# Patient Record
Sex: MALE | Race: ASIAN | Employment: OTHER | URBAN - METROPOLITAN AREA
[De-identification: names, ages, dates, MRNs, and addresses within clinical notes are randomized per-mention and may not be internally consistent; named-entity substitution may affect disease eponyms.]

---

## 2017-01-18 ENCOUNTER — TELEPHONE (OUTPATIENT)
Dept: FAMILY MEDICINE CLINIC | Facility: CLINIC | Age: 77
End: 2017-01-18

## 2017-01-18 DIAGNOSIS — E55.9 VITAMIN D DEFICIENCY: ICD-10-CM

## 2017-01-18 DIAGNOSIS — D64.9 ANEMIA, UNSPECIFIED TYPE: ICD-10-CM

## 2017-01-18 DIAGNOSIS — IMO0001 UNCONTROLLED TYPE 2 DIABETES MELLITUS WITHOUT COMPLICATION, WITHOUT LONG-TERM CURRENT USE OF INSULIN: Primary | ICD-10-CM

## 2017-01-18 DIAGNOSIS — I10 ESSENTIAL HYPERTENSION: ICD-10-CM

## 2017-01-18 RX ORDER — ERGOCALCIFEROL 1.25 MG/1
CAPSULE ORAL
Qty: 4 CAPSULE | Refills: 0 | OUTPATIENT
Start: 2017-01-18

## 2017-01-18 NOTE — TELEPHONE ENCOUNTER
Future Appointments  Date Time Provider [place]   1/19/2017 9:00 AM PF [facility] PF OUTPT [facility]   1/23/2017 2:45 PM Toro Cee MD [facility] [address] [facility] [address]

## 2017-01-19 ENCOUNTER — LAB ENCOUNTER (OUTPATIENT)
Dept: LAB | Age: 77
End: 2017-01-19
Attending: FAMILY MEDICINE
Payer: MEDICAID

## 2017-01-19 DIAGNOSIS — I10 ESSENTIAL HYPERTENSION: ICD-10-CM

## 2017-01-19 DIAGNOSIS — IMO0001 UNCONTROLLED TYPE 2 DIABETES MELLITUS WITHOUT COMPLICATION, WITHOUT LONG-TERM CURRENT USE OF INSULIN: ICD-10-CM

## 2017-01-19 DIAGNOSIS — E55.9 VITAMIN D DEFICIENCY: ICD-10-CM

## 2017-01-19 DIAGNOSIS — D64.9 ANEMIA, UNSPECIFIED TYPE: ICD-10-CM

## 2017-01-19 LAB
25-HYDROXYVITAMIN D (TOTAL): 39.3 NG/ML (ref 30–100)
BASOPHILS # BLD AUTO: 0.01 X10(3) UL (ref 0–0.1)
BASOPHILS NFR BLD AUTO: 0.2 %
BUN BLD-MCNC: 20 MG/DL (ref 8–20)
CALCIUM BLD-MCNC: 9.3 MG/DL (ref 8.3–10.3)
CHLORIDE: 104 MMOL/L (ref 101–111)
CO2: 25 MMOL/L (ref 22–32)
CREAT BLD-MCNC: 1.45 MG/DL (ref 0.7–1.3)
EOSINOPHIL # BLD AUTO: 0.02 X10(3) UL (ref 0–0.3)
EOSINOPHIL NFR BLD AUTO: 0.4 %
ERYTHROCYTE [DISTWIDTH] IN BLOOD BY AUTOMATED COUNT: 18.8 % (ref 11.5–16)
EST. AVERAGE GLUCOSE BLD GHB EST-MCNC: 186 MG/DL (ref 68–126)
GLUCOSE BLD-MCNC: 86 MG/DL (ref 70–99)
HBA1C MFR BLD HPLC: 8.1 % (ref ?–5.7)
HCT VFR BLD AUTO: 32.6 % (ref 37–53)
HGB BLD-MCNC: 10.2 G/DL (ref 13–17)
IMMATURE GRANULOCYTE COUNT: 0.01 X10(3) UL (ref 0–1)
IMMATURE GRANULOCYTE RATIO %: 0.2 %
LYMPHOCYTES # BLD AUTO: 0.83 X10(3) UL (ref 0.9–4)
LYMPHOCYTES NFR BLD AUTO: 17.3 %
MCH RBC QN AUTO: 20 PG (ref 27–33.2)
MCHC RBC AUTO-ENTMCNC: 31.3 G/DL (ref 31–37)
MCV RBC AUTO: 63.9 FL (ref 80–99)
MONOCYTES # BLD AUTO: 0.9 X10(3) UL (ref 0.1–0.6)
MONOCYTES NFR BLD AUTO: 18.8 %
NEUTROPHIL ABS PRELIM: 3.02 X10 (3) UL (ref 1.3–6.7)
NEUTROPHILS # BLD AUTO: 3.02 X10(3) UL (ref 1.3–6.7)
NEUTROPHILS NFR BLD AUTO: 63.1 %
PLATELET # BLD AUTO: 138 10(3)UL (ref 150–450)
POTASSIUM SERPL-SCNC: 4.2 MMOL/L (ref 3.6–5.1)
RBC # BLD AUTO: 5.1 X10(6)UL (ref 3.8–5.8)
RED CELL DISTRIBUTION WIDTH-SD: 39.1 FL (ref 35.1–46.3)
SODIUM SERPL-SCNC: 138 MMOL/L (ref 136–144)
WBC # BLD AUTO: 4.8 X10(3) UL (ref 4–13)

## 2017-01-19 PROCEDURE — 82306 VITAMIN D 25 HYDROXY: CPT

## 2017-01-19 PROCEDURE — 83036 HEMOGLOBIN GLYCOSYLATED A1C: CPT

## 2017-01-19 PROCEDURE — 80048 BASIC METABOLIC PNL TOTAL CA: CPT

## 2017-01-19 PROCEDURE — 36415 COLL VENOUS BLD VENIPUNCTURE: CPT

## 2017-01-19 PROCEDURE — 85025 COMPLETE CBC W/AUTO DIFF WBC: CPT

## 2017-01-20 ENCOUNTER — TELEPHONE (OUTPATIENT)
Dept: FAMILY MEDICINE CLINIC | Facility: CLINIC | Age: 77
End: 2017-01-20

## 2017-01-20 NOTE — TELEPHONE ENCOUNTER
Patient may continue medication as directed but he needs to eat properly also and should not skip meals. If any signs of low blood sugar occur patient should taken sugar immediately.

## 2017-01-20 NOTE — TELEPHONE ENCOUNTER
Future Appointments  Date Time Provider Craig Be   1/23/2017 11:15 AM Shelton Heimlich, MD [facility] [address] [facility] [address]

## 2017-01-20 NOTE — TELEPHONE ENCOUNTER
Daughter said patient has not been feeling well for over 4-5 days, she took him yesterday to get his labs drawn he has a appointment scheduled for Monday. He has not been eating properly or taking his meds like he should.  He did not take his meds yesterday

## 2017-01-21 NOTE — TELEPHONE ENCOUNTER
Spoke with patients daughter in law Danny Nevarez verified per HIPAA that patient should continue medication as directed and needs to eat properly and not skip any meals. Any signs of low blood sugar should take sugars immediately.  Danny Nevarez agrees with plan a

## 2017-01-23 ENCOUNTER — OFFICE VISIT (OUTPATIENT)
Dept: FAMILY MEDICINE CLINIC | Facility: CLINIC | Age: 77
End: 2017-01-23

## 2017-01-23 VITALS
HEIGHT: 66.5 IN | HEART RATE: 72 BPM | SYSTOLIC BLOOD PRESSURE: 118 MMHG | TEMPERATURE: 98 F | OXYGEN SATURATION: 98 % | RESPIRATION RATE: 12 BRPM | BODY MASS INDEX: 28.03 KG/M2 | DIASTOLIC BLOOD PRESSURE: 60 MMHG | WEIGHT: 176.5 LBS

## 2017-01-23 DIAGNOSIS — I10 ESSENTIAL HYPERTENSION: ICD-10-CM

## 2017-01-23 DIAGNOSIS — D64.9 ANEMIA, UNSPECIFIED TYPE: ICD-10-CM

## 2017-01-23 DIAGNOSIS — E78.00 HIGH CHOLESTEROL: ICD-10-CM

## 2017-01-23 DIAGNOSIS — IMO0001 UNCONTROLLED TYPE 2 DIABETES MELLITUS WITHOUT COMPLICATION, WITHOUT LONG-TERM CURRENT USE OF INSULIN: Primary | ICD-10-CM

## 2017-01-23 DIAGNOSIS — I25.83 CORONARY ARTERY DISEASE DUE TO LIPID RICH PLAQUE: ICD-10-CM

## 2017-01-23 DIAGNOSIS — E55.9 VITAMIN D DEFICIENCY: ICD-10-CM

## 2017-01-23 DIAGNOSIS — I25.10 CORONARY ARTERY DISEASE DUE TO LIPID RICH PLAQUE: ICD-10-CM

## 2017-01-23 PROCEDURE — 99214 OFFICE O/P EST MOD 30 MIN: CPT | Performed by: FAMILY MEDICINE

## 2017-01-23 NOTE — PROGRESS NOTES
HPI:    Patient ID: Alba Quiñones is a 77year old male.     HPI  Patient is here for follow-up of Patient Active Problem List:     Diabetes type 2, uncontrolled ([address]     High cholesterol     Essential hypertension     Coronary artery disease due to l Physical Exam  Awake, alert, in no acute distress  HENT:  normocephalic, atraumatic, external ear canals clear bilaterally, tympanic membranes appear opaque, non-bulging, non-erythematous, nasal turbinates are non-inflamed and not swollen, oropharynx wit hemoglobin A1c in 3 months and follow-up after. Patient will be in [facility]) for the next 4 months. We will see him back after that. No orders of the defined types were placed in this encounter.        Meds This Visit:  No prescriptions requested or ordered

## 2017-02-02 RX ORDER — LINAGLIPTIN 5 MG/1
TABLET, FILM COATED ORAL
Qty: 90 TABLET | Refills: 0 | Status: SHIPPED | OUTPATIENT
Start: 2017-02-02 | End: 2017-06-22

## 2017-02-02 NOTE — TELEPHONE ENCOUNTER
Diabetic Medication Protocol Failed2/2 10:52 AM   Last HgBA1C < 7.5        Medication pended and routed to PCP

## 2017-04-29 ENCOUNTER — TELEPHONE (OUTPATIENT)
Dept: FAMILY MEDICINE CLINIC | Facility: CLINIC | Age: 77
End: 2017-04-29

## 2017-06-23 RX ORDER — LINAGLIPTIN 5 MG/1
TABLET, FILM COATED ORAL
Qty: 90 TABLET | Refills: 0 | Status: SHIPPED | OUTPATIENT
Start: 2017-06-23 | End: 2017-07-26

## 2017-06-23 NOTE — TELEPHONE ENCOUNTER
Diabetic Medication Protocol Failed6/22 6:13 PM   Last HgBA1C < 7.5        Last A1C 1/19/17- 8.1    Medication pended and routed to PCP

## 2017-06-23 NOTE — TELEPHONE ENCOUNTER
Medicine improved, patient should also have fasting blood work done and follow-up appointment. See last progress note.

## 2017-06-30 ENCOUNTER — TELEPHONE (OUTPATIENT)
Dept: FAMILY MEDICINE CLINIC | Facility: CLINIC | Age: 77
End: 2017-06-30

## 2017-06-30 DIAGNOSIS — IMO0001 UNCONTROLLED TYPE 2 DIABETES MELLITUS WITHOUT COMPLICATION, WITHOUT LONG-TERM CURRENT USE OF INSULIN: Primary | ICD-10-CM

## 2017-07-03 NOTE — TELEPHONE ENCOUNTER
Pt daughter called and asked what medication would be recommended because Ernestene First is not covered by new [facility] community.  Per protocol pt daughter was told to call insurance and find out what medication is covered, then we would be able to call it int

## 2017-07-05 NOTE — ADDENDUM NOTE
Encounter addended by: Jan Saravia on: 7/5/2017 12:11 PM<BR>    Actions taken: Letter status changed

## 2017-07-18 ENCOUNTER — TELEPHONE (OUTPATIENT)
Dept: FAMILY MEDICINE CLINIC | Facility: CLINIC | Age: 77
End: 2017-07-18

## 2017-07-18 RX ORDER — GLYBURIDE-METFORMIN HYDROCHLORIDE 2.5; 5 MG/1; MG/1
1 TABLET ORAL 3 TIMES DAILY
Qty: 270 TABLET | Refills: 0 | Status: SHIPPED | OUTPATIENT
Start: 2017-07-18 | End: 2017-11-01

## 2017-07-18 NOTE — TELEPHONE ENCOUNTER
Received request from [address] in [facility] for new script (pt has new insurance). Pt will need Contour Next -strips, meter, and lancets. Received refill request from [employer] in [facility] for Glyburid/Metform 2.5-500 mg tab for quantity of 270.

## 2017-07-26 ENCOUNTER — TELEPHONE (OUTPATIENT)
Dept: FAMILY MEDICINE CLINIC | Facility: CLINIC | Age: 77
End: 2017-07-26

## 2017-07-26 DIAGNOSIS — I25.83 CORONARY ARTERY DISEASE DUE TO LIPID RICH PLAQUE: ICD-10-CM

## 2017-07-26 DIAGNOSIS — I25.10 CORONARY ARTERY DISEASE DUE TO LIPID RICH PLAQUE: ICD-10-CM

## 2017-07-26 DIAGNOSIS — I10 ESSENTIAL HYPERTENSION: ICD-10-CM

## 2017-07-26 DIAGNOSIS — IMO0001 UNCONTROLLED TYPE 2 DIABETES MELLITUS WITHOUT COMPLICATION, WITHOUT LONG-TERM CURRENT USE OF INSULIN: Primary | ICD-10-CM

## 2017-07-26 NOTE — TELEPHONE ENCOUNTER
Patient has been off the Trajenta 5 mg  for two weeks. Vanessa Reddy is not covered by patient's insurance.  Patient's daughter states that the insurance gave her the names of two medications that are covered, Alogliptin and Benzoate, but they are both for 25 mg

## 2017-07-26 NOTE — TELEPHONE ENCOUNTER
Dr Ameena Johnson said to give patient enough samples to last until Dr Samson Choudhury returns back to office. Daughter notified and will  samples today.  New meter and test strips sent to [facility]

## 2017-08-18 ENCOUNTER — TELEPHONE (OUTPATIENT)
Dept: FAMILY MEDICINE CLINIC | Facility: CLINIC | Age: 77
End: 2017-08-18

## 2017-08-18 NOTE — TELEPHONE ENCOUNTER
Future Appointments  Date Time Provider Craig Be   9/9/2017 12:00 PM Paulo Gates MD [facility] [address] [facility] [address]     Samples available  Please advise if it is ok for patient to have samples and how many allowed?

## 2017-08-18 NOTE — TELEPHONE ENCOUNTER
Patient has an appt for 9-9-17 with Dr. Eligio Jarvis. Patient would like to know if there are any samples of Trajenta, as he is getting low.

## 2017-08-27 ENCOUNTER — APPOINTMENT (OUTPATIENT)
Dept: GENERAL RADIOLOGY | Facility: HOSPITAL | Age: 77
DRG: 247 | End: 2017-08-27
Attending: EMERGENCY MEDICINE
Payer: MEDICAID

## 2017-08-27 ENCOUNTER — TELEPHONE (OUTPATIENT)
Dept: FAMILY MEDICINE CLINIC | Facility: CLINIC | Age: 77
End: 2017-08-27

## 2017-08-27 ENCOUNTER — HOSPITAL ENCOUNTER (INPATIENT)
Facility: HOSPITAL | Age: 77
LOS: 2 days | Discharge: HOME | DRG: 247 | End: 2017-08-29
Attending: EMERGENCY MEDICINE | Admitting: HOSPITALIST
Payer: MEDICAID

## 2017-08-27 DIAGNOSIS — R07.9 ACUTE CHEST PAIN: Primary | ICD-10-CM

## 2017-08-27 DIAGNOSIS — R77.8 ELEVATED TROPONIN: ICD-10-CM

## 2017-08-27 DIAGNOSIS — I48.91 ATRIAL FIBRILLATION WITH RAPID VENTRICULAR RESPONSE (HCC): ICD-10-CM

## 2017-08-27 LAB
ALBUMIN SERPL-MCNC: 3.7 G/DL (ref 3.5–4.8)
ALP LIVER SERPL-CCNC: 102 U/L (ref 45–117)
ALT SERPL-CCNC: 25 U/L (ref 17–63)
APTT PPP: 156.5 SECONDS (ref 25–34)
APTT PPP: 30.3 SECONDS (ref 25–34)
AST SERPL-CCNC: 20 U/L (ref 15–41)
ATRIAL RATE: 105 BPM
BASOPHILS # BLD AUTO: 0.01 X10(3) UL (ref 0–0.1)
BASOPHILS NFR BLD AUTO: 0.2 %
BILIRUB SERPL-MCNC: 0.7 MG/DL (ref 0.1–2)
BUN BLD-MCNC: 20 MG/DL (ref 8–20)
CALCIUM BLD-MCNC: 9.4 MG/DL (ref 8.3–10.3)
CHLORIDE: 106 MMOL/L (ref 101–111)
CO2: 24 MMOL/L (ref 22–32)
CREAT BLD-MCNC: 1.35 MG/DL (ref 0.7–1.3)
EOSINOPHIL # BLD AUTO: 0.02 X10(3) UL (ref 0–0.3)
EOSINOPHIL NFR BLD AUTO: 0.3 %
ERYTHROCYTE [DISTWIDTH] IN BLOOD BY AUTOMATED COUNT: 18.5 % (ref 11.5–16)
EST. AVERAGE GLUCOSE BLD GHB EST-MCNC: 203 MG/DL (ref 68–126)
GLUCOSE BLD-MCNC: 128 MG/DL (ref 65–99)
GLUCOSE BLD-MCNC: 150 MG/DL (ref 65–99)
GLUCOSE BLD-MCNC: 251 MG/DL (ref 70–99)
HBA1C MFR BLD HPLC: 8.7 % (ref ?–5.7)
HCT VFR BLD AUTO: 34 % (ref 37–53)
HGB BLD-MCNC: 10.6 G/DL (ref 13–17)
IMMATURE GRANULOCYTE COUNT: 0.02 X10(3) UL (ref 0–1)
IMMATURE GRANULOCYTE RATIO %: 0.3 %
LYMPHOCYTES # BLD AUTO: 1.01 X10(3) UL (ref 0.9–4)
LYMPHOCYTES NFR BLD AUTO: 16.5 %
M PROTEIN MFR SERPL ELPH: 7.6 G/DL (ref 6.1–8.3)
MCH RBC QN AUTO: 19.8 PG (ref 27–33.2)
MCHC RBC AUTO-ENTMCNC: 31.2 G/DL (ref 31–37)
MCV RBC AUTO: 63.4 FL (ref 80–99)
MONOCYTES # BLD AUTO: 0.59 X10(3) UL (ref 0.1–0.6)
MONOCYTES NFR BLD AUTO: 9.6 %
NEUTROPHIL ABS PRELIM: 4.47 X10 (3) UL (ref 1.3–6.7)
NEUTROPHILS # BLD AUTO: 4.47 X10(3) UL (ref 1.3–6.7)
NEUTROPHILS NFR BLD AUTO: 73.1 %
PLATELET # BLD AUTO: 152 10(3)UL (ref 150–450)
PLATELET MORPHOLOGY: NORMAL
POTASSIUM SERPL-SCNC: 5.5 MMOL/L (ref 3.6–5.1)
Q-T INTERVAL: 286 MS
QRS DURATION: 106 MS
QTC CALCULATION (BEZET): 394 MS
R AXIS: -45 DEGREES
RBC # BLD AUTO: 5.36 X10(6)UL (ref 3.8–5.8)
RED CELL DISTRIBUTION WIDTH-SD: 38.8 FL (ref 35.1–46.3)
SODIUM SERPL-SCNC: 136 MMOL/L (ref 136–144)
T AXIS: 83 DEGREES
TROPONIN: 0.14 NG/ML (ref ?–0.05)
TROPONIN: 9.58 NG/ML (ref ?–0.05)
VENTRICULAR RATE: 114 BPM
WBC # BLD AUTO: 6.1 X10(3) UL (ref 4–13)

## 2017-08-27 PROCEDURE — 71010 XR CHEST AP PORTABLE  (CPT=71010): CPT | Performed by: EMERGENCY MEDICINE

## 2017-08-27 PROCEDURE — 99223 1ST HOSP IP/OBS HIGH 75: CPT | Performed by: HOSPITALIST

## 2017-08-27 RX ORDER — METOPROLOL SUCCINATE 25 MG/1
25 TABLET, EXTENDED RELEASE ORAL ONCE
Status: DISCONTINUED | OUTPATIENT
Start: 2017-08-27 | End: 2017-08-29

## 2017-08-27 RX ORDER — CLOPIDOGREL BISULFATE 75 MG/1
75 TABLET ORAL DAILY
Status: DISCONTINUED | OUTPATIENT
Start: 2017-08-27 | End: 2017-08-28

## 2017-08-27 RX ORDER — ASPIRIN 81 MG/1
324 TABLET, CHEWABLE ORAL ONCE
Status: COMPLETED | OUTPATIENT
Start: 2017-08-27 | End: 2017-08-27

## 2017-08-27 RX ORDER — NITROGLYCERIN 0.4 MG/1
0.4 TABLET SUBLINGUAL ONCE
Status: COMPLETED | OUTPATIENT
Start: 2017-08-27 | End: 2017-08-27

## 2017-08-27 RX ORDER — HEPARIN SODIUM AND DEXTROSE 10000; 5 [USP'U]/100ML; G/100ML
INJECTION INTRAVENOUS CONTINUOUS
Status: DISCONTINUED | OUTPATIENT
Start: 2017-08-27 | End: 2017-08-28

## 2017-08-27 RX ORDER — SODIUM CHLORIDE 9 MG/ML
INJECTION, SOLUTION INTRAVENOUS CONTINUOUS
Status: DISCONTINUED | OUTPATIENT
Start: 2017-08-27 | End: 2017-08-29

## 2017-08-27 RX ORDER — ASPIRIN 81 MG/1
324 TABLET, CHEWABLE ORAL ONCE
Status: DISCONTINUED | OUTPATIENT
Start: 2017-08-27 | End: 2017-08-27

## 2017-08-27 RX ORDER — SODIUM CHLORIDE 9 MG/ML
INJECTION, SOLUTION INTRAVENOUS CONTINUOUS
Status: ACTIVE | OUTPATIENT
Start: 2017-08-27 | End: 2017-08-27

## 2017-08-27 RX ORDER — HEPARIN SODIUM AND DEXTROSE 10000; 5 [USP'U]/100ML; G/100ML
12 INJECTION INTRAVENOUS ONCE
Status: COMPLETED | OUTPATIENT
Start: 2017-08-27 | End: 2017-08-27

## 2017-08-27 RX ORDER — METOPROLOL TARTRATE 5 MG/5ML
5 INJECTION INTRAVENOUS ONCE
Status: COMPLETED | OUTPATIENT
Start: 2017-08-27 | End: 2017-08-27

## 2017-08-27 RX ORDER — METOPROLOL SUCCINATE 50 MG/1
50 TABLET, EXTENDED RELEASE ORAL
Status: DISCONTINUED | OUTPATIENT
Start: 2017-08-28 | End: 2017-08-29

## 2017-08-27 RX ORDER — DEXTROSE MONOHYDRATE 25 G/50ML
50 INJECTION, SOLUTION INTRAVENOUS
Status: DISCONTINUED | OUTPATIENT
Start: 2017-08-27 | End: 2017-08-29

## 2017-08-27 RX ORDER — HEPARIN SODIUM 5000 [USP'U]/ML
60 INJECTION INTRAVENOUS; SUBCUTANEOUS ONCE
Status: COMPLETED | OUTPATIENT
Start: 2017-08-27 | End: 2017-08-27

## 2017-08-27 RX ORDER — ONDANSETRON 2 MG/ML
4 INJECTION INTRAMUSCULAR; INTRAVENOUS EVERY 6 HOURS PRN
Status: DISCONTINUED | OUTPATIENT
Start: 2017-08-27 | End: 2017-08-29

## 2017-08-27 RX ORDER — ERGOCALCIFEROL 1.25 MG/1
50000 CAPSULE ORAL WEEKLY
Status: DISCONTINUED | OUTPATIENT
Start: 2017-08-27 | End: 2017-08-29

## 2017-08-27 RX ORDER — ONDANSETRON 2 MG/ML
4 INJECTION INTRAMUSCULAR; INTRAVENOUS EVERY 4 HOURS PRN
Status: DISCONTINUED | OUTPATIENT
Start: 2017-08-27 | End: 2017-08-29

## 2017-08-27 RX ORDER — ASPIRIN 81 MG/1
81 TABLET, CHEWABLE ORAL DAILY
Status: DISCONTINUED | OUTPATIENT
Start: 2017-08-27 | End: 2017-08-28

## 2017-08-27 RX ORDER — LISINOPRIL 40 MG/1
40 TABLET ORAL DAILY
Status: DISCONTINUED | OUTPATIENT
Start: 2017-08-27 | End: 2017-08-27

## 2017-08-27 RX ORDER — NITROGLYCERIN 0.4 MG/1
0.4 TABLET SUBLINGUAL EVERY 5 MIN PRN
Status: DISCONTINUED | OUTPATIENT
Start: 2017-08-27 | End: 2017-08-29

## 2017-08-27 RX ORDER — ATORVASTATIN CALCIUM 40 MG/1
40 TABLET, FILM COATED ORAL NIGHTLY
Status: DISCONTINUED | OUTPATIENT
Start: 2017-08-27 | End: 2017-08-29

## 2017-08-27 RX ORDER — ACETAMINOPHEN 325 MG/1
650 TABLET ORAL EVERY 6 HOURS PRN
Status: DISCONTINUED | OUTPATIENT
Start: 2017-08-27 | End: 2017-08-29

## 2017-08-27 RX ORDER — AMLODIPINE BESYLATE 5 MG/1
10 TABLET ORAL DAILY
Status: DISCONTINUED | OUTPATIENT
Start: 2017-08-27 | End: 2017-08-29

## 2017-08-27 NOTE — H&P
[facility]  History and Physical     Sherri Espinosa Patient Status:  Inpatient    1940 MRN [id number]   [facility] 2NE-A Attending Rosemarie Mejia MD   Hosp Day # 0 PCP Albert Cortes MD     Chief Complaint: chest pain, d mention of complication, not stated as uncontrolled    • Unspecified essential hypertension         Past Surgical History: Past Surgical History:  No date: ANGIOGRAM  No date: ANGIOPLASTY (CORONARY)    Social History:  reports that he has never smoked.  He Suppl (ISABELLA CONTOUR NEXT MONITOR) w/Device Does not apply Kit 1 Device by Other route 2 (two) times daily. Use as directed. Disp: 1 kit Rfl: 0   Glucose Blood (ISABELLA CONTOUR NEXT TEST) In Vitro Strip Use as directed.  Disp: 100 strip Rfl: 1   ISABELLA [facility] 08/27/17   1102   WBC  6.1   HGB  10.6*   MCV  63.4*   PLT  152.0       Recent Labs   Lab  08/27/17   1102   GLU  251*   BUN  20   CREATSERUM  1.35*   CA  9.4   ALB  3.7   NA  136   K  5.5*   CL  106   CO2  24.0   ALKPHO  102   AST  20   ALT  25   BILT  0.

## 2017-08-27 NOTE — PLAN OF CARE
NURSING ADMISSION NOTE      Patient admitted via cart  Oriented to room. Safety precautions initiated. Bed in low position. Call light in reach. Received from ER, updated history and pta meds.  Patient states his chest pain is more discomfort and he

## 2017-08-27 NOTE — ED PROVIDER NOTES
Patient Seen in: [facility] Emergency Department    History   Patient presents with:  Hypotension (cardiovascular)    Stated Complaint: hypotention    HPI  The patient is a 67-year-old male who arrives here with complaints of questionable low blood metoprolol succinate (TOPROL XL) 12.5 mg Oral Tab,  Take 50 mg by mouth Daily Beta Blocker. nitroGLYCERIN (NITROSTAT) 0.4 MG Sublingual SL Tab,  Place 0.4 mg under the tongue every 5 (five) minutes as needed for Chest pain.    Linagliptin (TRADJENTA) 5 1105]  Pulse: 97 [08/27/17 1105]  Resp: 18 [08/27/17 1105]  Temp: 97.6 °F (36.4 °C) [08/27/17 1311]  Temp src: Oral [08/27/17 1311]  SpO2: 99 % [08/27/17 1105]  O2 Device: None (Room air) [08/27/17 1105]    Current:/70 (BP Location: Left arm)   Pulse normal limits   PTT, ACTIVATED - Normal    Narrative: The aPTT Heparin Therapeutic Range is approximately 65- 104 seconds. The therapeutic range has been validated against 0.3-0.7 heparin anti-Xa units/mL.      CBC WITH DIFFERENTIAL WITH PLATELET    Ravinder Impression:  Acute chest pain  (primary encounter diagnosis)  Atrial fibrillation with rapid ventricular response (HCC)  Elevated troponin    Disposition:  Admit    Follow-up:  No follow-up provider specified.     Medications Prescribed:  Current Discharge

## 2017-08-27 NOTE — CONSULTS
[facility]  Cardiology Consultation    Lucita Boykin Patient Status:  Inpatient    1940 MRN [id number]   [facility] 2NE-A Attending Fan Medrano MD   Hosp Day # 0 PCP Samuel Hassan MD     Reason for Consultation:  Maury Garzon 75 mg, Oral, Daily  •  ergocalciferol (DRISDOL/VITAMIN D2) cap 50,000 Units, 50,000 Units, Oral, Weekly  •  [START ON 8/28/2017] Metoprolol Succinate ER (Toprol XL) 24 hr tab 50 mg, 50 mg, Oral, Daily Beta Blocker  •  nitroGLYCERIN (NITROSTAT) SL tab 0.4 m No respiratory or constitutional distress. HEENT: Normocephalic, anicteric sclera, neck supple. Neck: No JVD, carotids 2+, no bruits. Cardiac: irreg irreg, LATA  Lungs: Clear without wheezes, rales, rhonchi or dullness. Normal excursions and effort.   Ab

## 2017-08-27 NOTE — PLAN OF CARE
Patient was in a fib when he arrived this afternoon, he converted to sinus jason around 1400. Patient states he feels much better and denies any further chest discomfort. He tolerated up in the chair and ambulated in halls. His heart rate has been 50-58.

## 2017-08-27 NOTE — ED INITIAL ASSESSMENT (HPI)
Pt presents to ER with low blood pressure, sweating, and chest pressure. No SOB. Yes dizzy. Family first noted a bp of 53/48, and a 2nd BP of 94/71. Glucose at home was 228. Pt continues to have chest pressure. Respirations equal and nonlabored.  Speaking c

## 2017-08-27 NOTE — TELEPHONE ENCOUNTER
Incoming call from pt's daughter in law questioning whether or not pt should go to ER. Pt this morning with profuse sweating and low BP of 70/40. Repeat BPs around 120/70 and pulse 110-120. Per daughter in law, pt sitting up and conversing normally.   Deepali You

## 2017-08-27 NOTE — HISTORICAL OFFICE NOTE
Elizabeth Slaughter  : 1940  ACCOUNT:  [id number]  [phone]  PCP: Dr. Solis Shook     TODAY'S DATE: 2016  DICTATED BY:  Cortez Guy Bonnetta Najjar, M.D.]    CHIEF COMPLAINT: [Followup of CAD, of native vessels, Followup of Carotid artery stenosis, bilat transient atrial fibrillation in a lab that went away with a single dose of IV amiodarone. He has never had any recurrence of palpitations. He has a slight lump in his right groin; it is not painful, it is very tiny, he says.  The patient's daughter-in-law hypertension; abnl ufct 2015 with score 1456, laser atherectomy and kissing balloons for isr of OM1 October 2016- no new stents placed at that time.  native rca 90%;  lad with 50-60% with neg ffr and PTCA and stent placement in the circumflex with RAYO alvarado an upper endoscopy and colonoscopy without stopping aspirin and Plavix to see if there are any lesions that might need to be more urgently removed.  In terms of his residual coronary disease, I have e-mailed Dr. Bud Santoroate today to see whether or not what his t Rosa Isela Mitchell M.D.  MD/omari - DD: 11/07/2016 - DT: 11/08/2016 - Job ID: [id number] -   c: Dr. Jose L Cade, Dr. Harman Burgess - Fax [phone]

## 2017-08-28 ENCOUNTER — APPOINTMENT (OUTPATIENT)
Dept: INTERVENTIONAL RADIOLOGY/VASCULAR | Facility: HOSPITAL | Age: 77
DRG: 247 | End: 2017-08-28
Attending: HOSPITALIST
Payer: MEDICAID

## 2017-08-28 ENCOUNTER — APPOINTMENT (OUTPATIENT)
Dept: CV DIAGNOSTICS | Facility: HOSPITAL | Age: 77
DRG: 247 | End: 2017-08-28
Attending: INTERNAL MEDICINE
Payer: MEDICAID

## 2017-08-28 LAB
APTT PPP: 72.7 SECONDS (ref 25–34)
ATRIAL RATE: 55 BPM
ATRIAL RATE: 56 BPM
ATRIAL RATE: 58 BPM
BUN BLD-MCNC: 20 MG/DL (ref 8–20)
CALCIUM BLD-MCNC: 9 MG/DL (ref 8.3–10.3)
CHLORIDE: 107 MMOL/L (ref 101–111)
CHOLEST SMN-MCNC: 99 MG/DL (ref ?–200)
CO2: 26 MMOL/L (ref 22–32)
CREAT BLD-MCNC: 1.16 MG/DL (ref 0.7–1.3)
ERYTHROCYTE [DISTWIDTH] IN BLOOD BY AUTOMATED COUNT: 18.4 % (ref 11.5–16)
GLUCOSE BLD-MCNC: 134 MG/DL (ref 70–99)
GLUCOSE BLD-MCNC: 147 MG/DL (ref 65–99)
GLUCOSE BLD-MCNC: 151 MG/DL (ref 65–99)
GLUCOSE BLD-MCNC: 181 MG/DL (ref 65–99)
GLUCOSE BLD-MCNC: 205 MG/DL (ref 65–99)
GLUCOSE BLD-MCNC: 230 MG/DL (ref 65–99)
HCT VFR BLD AUTO: 33.1 % (ref 37–53)
HDLC SERPL-MCNC: 32 MG/DL (ref 45–?)
HDLC SERPL: 3.09 {RATIO} (ref ?–4.97)
HGB BLD-MCNC: 10.3 G/DL (ref 13–17)
ISTAT ACTIVATED CLOTTING TIME: 384 SECONDS (ref 74–137)
LDLC SERPL CALC-MCNC: 50 MG/DL (ref ?–130)
LDLC SERPL-MCNC: 17 MG/DL (ref 5–40)
MCH RBC QN AUTO: 19.7 PG (ref 27–33.2)
MCHC RBC AUTO-ENTMCNC: 31.1 G/DL (ref 31–37)
MCV RBC AUTO: 63.2 FL (ref 80–99)
NONHDLC SERPL-MCNC: 67 MG/DL (ref ?–130)
P AXIS: 39 DEGREES
P AXIS: 40 DEGREES
P AXIS: 43 DEGREES
P-R INTERVAL: 150 MS
P-R INTERVAL: 152 MS
P-R INTERVAL: 152 MS
PLATELET # BLD AUTO: 154 10(3)UL (ref 150–450)
POTASSIUM SERPL-SCNC: 4.5 MMOL/L (ref 3.6–5.1)
Q-T INTERVAL: 382 MS
Q-T INTERVAL: 410 MS
Q-T INTERVAL: 410 MS
QRS DURATION: 102 MS
QRS DURATION: 106 MS
QRS DURATION: 110 MS
QTC CALCULATION (BEZET): 374 MS
QTC CALCULATION (BEZET): 392 MS
QTC CALCULATION (BEZET): 395 MS
R AXIS: -43 DEGREES
R AXIS: -43 DEGREES
R AXIS: -45 DEGREES
RBC # BLD AUTO: 5.24 X10(6)UL (ref 3.8–5.8)
RED CELL DISTRIBUTION WIDTH-SD: 38.8 FL (ref 35.1–46.3)
SODIUM SERPL-SCNC: 138 MMOL/L (ref 136–144)
T AXIS: 17 DEGREES
T AXIS: 17 DEGREES
T AXIS: 35 DEGREES
TRIGLYCERIDES: 84 MG/DL (ref ?–150)
TROPONIN: 24.1 NG/ML (ref ?–0.05)
VENTRICULAR RATE: 55 BPM
VENTRICULAR RATE: 56 BPM
VENTRICULAR RATE: 58 BPM
WBC # BLD AUTO: 5.6 X10(3) UL (ref 4–13)

## 2017-08-28 PROCEDURE — 027135Z DILATION OF CORONARY ARTERY, TWO ARTERIES WITH TWO DRUG-ELUTING INTRALUMINAL DEVICES, PERCUTANEOUS APPROACH: ICD-10-PCS | Performed by: INTERNAL MEDICINE

## 2017-08-28 PROCEDURE — 93306 TTE W/DOPPLER COMPLETE: CPT | Performed by: INTERNAL MEDICINE

## 2017-08-28 PROCEDURE — 4A023N7 MEASUREMENT OF CARDIAC SAMPLING AND PRESSURE, LEFT HEART, PERCUTANEOUS APPROACH: ICD-10-PCS | Performed by: INTERNAL MEDICINE

## 2017-08-28 PROCEDURE — B2151ZZ FLUOROSCOPY OF LEFT HEART USING LOW OSMOLAR CONTRAST: ICD-10-PCS | Performed by: INTERNAL MEDICINE

## 2017-08-28 PROCEDURE — 99232 SBSQ HOSP IP/OBS MODERATE 35: CPT | Performed by: HOSPITALIST

## 2017-08-28 PROCEDURE — B2111ZZ FLUOROSCOPY OF MULTIPLE CORONARY ARTERIES USING LOW OSMOLAR CONTRAST: ICD-10-PCS | Performed by: INTERNAL MEDICINE

## 2017-08-28 RX ORDER — SODIUM CHLORIDE 9 MG/ML
INJECTION, SOLUTION INTRAVENOUS CONTINUOUS
Status: ACTIVE | OUTPATIENT
Start: 2017-08-28 | End: 2017-08-28

## 2017-08-28 RX ORDER — MIDAZOLAM HYDROCHLORIDE 1 MG/ML
INJECTION INTRAMUSCULAR; INTRAVENOUS
Status: COMPLETED
Start: 2017-08-28 | End: 2017-08-28

## 2017-08-28 RX ORDER — CLOPIDOGREL BISULFATE 75 MG/1
75 TABLET ORAL DAILY
Status: DISCONTINUED | OUTPATIENT
Start: 2017-08-29 | End: 2017-08-29

## 2017-08-28 RX ORDER — LIDOCAINE HYDROCHLORIDE 10 MG/ML
INJECTION, SOLUTION INFILTRATION; PERINEURAL
Status: COMPLETED
Start: 2017-08-28 | End: 2017-08-28

## 2017-08-28 RX ORDER — HEPARIN SODIUM 5000 [USP'U]/ML
INJECTION, SOLUTION INTRAVENOUS; SUBCUTANEOUS
Status: COMPLETED
Start: 2017-08-28 | End: 2017-08-28

## 2017-08-28 NOTE — PAYOR COMM NOTE
--------------  ADMISSION REVIEW     Payor: [employer]  Subscriber #:  [id number]  Authorization Number: N/A    Admit date: 8/27/17  Admit time: 2138       Admitting Physician: Richard Barrera MD  Attending Physician:  Aniya Friedman MD  Pr the following:     Troponin 0.136 (*)     All other components within normal limits   RBC MORPHOLOGY SCAN - Abnormal; Notable for the following:     RBC Morphology See morphology below (*)     Microcytosis Moderate (*)     Tear Drop Cells Small (*)     Ova heparin gtt  2. NSTEMI  1. Monitor troponins  2. EKG  3. ECHO  4. Possible angio in am   3. DM type II  1. Hold oral meds  2. Hyperglycemia protocol  4. CAD  1. Resume home meds  5.  Carotid artery stenosis      MEDICATIONS ADMINISTERED IN LAST 1 DAY:  AmLO Katelynn Preciado RN      0.9%  NaCl infusion     Date Action Dose Route User    8/28/2017 7195 New Bag (none) Intravenous Cricket Clifton RN        PLAN: - admit with tele  - IV heparin per ACS protocol  - trend cardiac enzymes  - [facility] with poss PCI oma

## 2017-08-28 NOTE — PROCEDURES
[facility]  PCI Procedure Note    Nilay Laguerre Location: Cath Lab    [facility] [id number] MRN [id number]   Admission Date 8/27/2017 Procedure Date 8/28/2017   Attending Physician Jonathan Monteiro MD Procedure Physician Valeria Alcaraz MD     Pre-Procedur Mercedes PARTIDA 0.75 guide catheter was used to cannulate the right coronary artery. The vessel was dilated with a 2.25 mm balloon. A 2.25 x 20 mm Promus drug-eluting stent was deployed. Manual pressure was ultimately used to obtain hemostasis.     IV was ma

## 2017-08-28 NOTE — PROGRESS NOTES
Received pt back from cath lab accompanied per staff s/p PTCA/STEPHENIE to ramus and rca. Right groin site is soft to touch w/ femstop in place from the cath cath lab post manual pressure. Poc updated, routine post cath recovery.  Cpm.

## 2017-08-28 NOTE — PLAN OF CARE
CARDIOVASCULAR - ADULT    • Absence of cardiac arrhythmias or at baseline Progressing            AOx4. Room Air. Denies SOB/CP/other discomfort. NAD. NSR/SB w/ BBB. Heparin drip per ACS protocol. Denies CP. Plan for angiogram tomorrow.  Consent signed & paula

## 2017-08-28 NOTE — DIETARY NOTE
Nutrition Short Note   Dietitian consult received per cardiac rehab standing order. Pt to be educated by cardiac rehab staff as appropriate and encouraged to attend outpatient classes taught by DEANA. DEANA available PRN.      Jack Cramer RD, LDN  Pager [phone]

## 2017-08-28 NOTE — PROGRESS NOTES
[facility]  Progress Note     Rue Form Patient Status:  Inpatient    1940 MRN [id number]   [facility] 2NE-A Attending Anais Bernard MD   Hosp Day # 1 PCP Shamika Negron MD     Chief Complaint: chest pressure    S: 50,000 Units Oral Weekly   • metoprolol succinate  50 mg Oral Daily Beta Blocker   • insulin detemir  10 Units Subcutaneous Daily   • Insulin Aspart Pen  2-10 Units Subcutaneous TID CC and HS       ASSESSMENT / PLAN:     1. A.fib with RVR  1.  In NSR  2. On

## 2017-08-29 VITALS
WEIGHT: 171.31 LBS | HEART RATE: 59 BPM | SYSTOLIC BLOOD PRESSURE: 110 MMHG | OXYGEN SATURATION: 96 % | RESPIRATION RATE: 18 BRPM | DIASTOLIC BLOOD PRESSURE: 60 MMHG | TEMPERATURE: 98 F | HEIGHT: 67 IN | BODY MASS INDEX: 26.89 KG/M2

## 2017-08-29 LAB
BUN BLD-MCNC: 23 MG/DL (ref 8–20)
CALCIUM BLD-MCNC: 9.1 MG/DL (ref 8.3–10.3)
CHLORIDE: 105 MMOL/L (ref 101–111)
CO2: 24 MMOL/L (ref 22–32)
CREAT BLD-MCNC: 1.25 MG/DL (ref 0.7–1.3)
ERYTHROCYTE [DISTWIDTH] IN BLOOD BY AUTOMATED COUNT: 18.6 % (ref 11.5–16)
GLUCOSE BLD-MCNC: 128 MG/DL (ref 70–99)
GLUCOSE BLD-MCNC: 153 MG/DL (ref 65–99)
GLUCOSE BLD-MCNC: 196 MG/DL (ref 65–99)
GLUCOSE BLD-MCNC: 243 MG/DL (ref 65–99)
HCT VFR BLD AUTO: 33.7 % (ref 37–53)
HGB BLD-MCNC: 10.5 G/DL (ref 13–17)
MCH RBC QN AUTO: 19.6 PG (ref 27–33.2)
MCHC RBC AUTO-ENTMCNC: 31.2 G/DL (ref 31–37)
MCV RBC AUTO: 62.8 FL (ref 80–99)
PLATELET # BLD AUTO: 141 10(3)UL (ref 150–450)
POTASSIUM SERPL-SCNC: 4.2 MMOL/L (ref 3.6–5.1)
RBC # BLD AUTO: 5.37 X10(6)UL (ref 3.8–5.8)
RED CELL DISTRIBUTION WIDTH-SD: 38.3 FL (ref 35.1–46.3)
SODIUM SERPL-SCNC: 138 MMOL/L (ref 136–144)
WBC # BLD AUTO: 6.2 X10(3) UL (ref 4–13)

## 2017-08-29 PROCEDURE — 99239 HOSP IP/OBS DSCHRG MGMT >30: CPT | Performed by: HOSPITALIST

## 2017-08-29 RX ORDER — METOPROLOL SUCCINATE 50 MG/1
50 TABLET, EXTENDED RELEASE ORAL
Qty: 30 TABLET | Refills: 5 | Status: ON HOLD | OUTPATIENT
Start: 2017-08-29 | End: 2017-09-09

## 2017-08-29 NOTE — DISCHARGE SUMMARY
[facility]  DISCHARGE SUMMARY     Ngozi Daniels Patient Status:  Inpatient    1940 MRN [id number]   [facility] 2NE-A Attending Shell Pascual MD   Hosp Day # 2 PCP Armand Whitley MD     Date of Admission: 2017  Date associating or relieving factors. He denies any radiation to his neck or his arm. He denies any shortness of breath. He states it was a pressure-like sensation.   He states that the pain is now much better and he rates it about a 2 out of 10 in intensity MOUTH ONCE DAILY. Quantity:  90 tablet  Refills:  1     atorvastatin 40 MG Tabs  Commonly known as:  LIPITOR      Take 40 mg by mouth nightly. Refills:  3     ISABELLA MICROLET LANCETS Misc      1 lancet by Finger stick route 2 (two) times daily.  Use as d lisinopril 40 MG Tabs  Commonly known as:  PRINIVIL,ZESTRIL      TAKE 1 TABLET BY MOUTH ONCE DAILY.    Quantity:  90 tablet  Refills:  1           Where to Get Your Medications      These medications were sent to [address], [place] -

## 2017-08-29 NOTE — PLAN OF CARE
Patient is alert and oriented. Saturates well on room air, NSR on the tele. Right groin is c/d/i, soft to the touch. Patient denies any chest pain or SOB. POC updated, all questions answered. Call light within reach, will continue to monitor.      IV disc

## 2017-08-29 NOTE — CM/SW NOTE
Call received from primary RN to check for Xarelto coverage. Prescription called into to patient's pharmacy [facility] [phone], requires prior auth.  Call to Insurance pharmacy plan [phone], pa initiated, was flagged urgent, will take 24-72 hours f

## 2017-08-29 NOTE — PROGRESS NOTES
[facility]  Progress Note     Sreedhar Jesus Patient Status:  Inpatient    1940 MRN [id number]   [facility] 2NE-A Attending Berta Omer MD   Hosp Day # 2 PCP Sebastián Mensah MD     Chief Complaint: chest pressure    S: succinate  25 mg Oral Once   • AmLODIPine Besylate  10 mg Oral Daily   • atorvastatin  40 mg Oral Nightly   • ergocalciferol  50,000 Units Oral Weekly   • metoprolol succinate  50 mg Oral Daily Beta Blocker   • insulin detemir  10 Units Subcutaneous Daily

## 2017-08-29 NOTE — PROGRESS NOTES
[facility]  Cardiology Progress Note    Mayito Dempsey Patient Status:  Inpatient    1940 MRN [id number]   [facility] 2NE-A Attending Day Orona MD   Hosp Day # 2 PCP Donavan Cameron MD     Subjective:  No complaints of c Beta Blocker   • insulin detemir  10 Units Subcutaneous Daily   • Insulin Aspart Pen  2-10 Units Subcutaneous TID CC and HS     • sodium chloride 20 mL/hr at 08/28/17 6023       Assessment:  · NSTEMI, Troponin 24 - s/p  successful PTCA and stent placement

## 2017-08-29 NOTE — PLAN OF CARE
CARDIOVASCULAR - ADULT    • Absence of cardiac arrhythmias or at baseline Progressing        Diabetes/Glucose Control    • Glucose maintained within prescribed range Progressing

## 2017-08-30 ENCOUNTER — PATIENT OUTREACH (OUTPATIENT)
Dept: CASE MANAGEMENT | Age: 77
End: 2017-08-30

## 2017-08-30 ENCOUNTER — PRIOR ORIGINAL RECORDS (OUTPATIENT)
Dept: OTHER | Age: 77
End: 2017-08-30

## 2017-08-30 NOTE — PAYOR COMM NOTE
--------------  DISCHARGE REVIEW    Payor: [employer]  Subscriber #:  [id number]  Authorization Number: N/A    Admit date: 8/27/17  Admit time:  1301  Discharge Date: 8/29/2017  7:10 PM     Admitting Physician: Saloni Currie MD  Attending

## 2017-08-30 NOTE — PROGRESS NOTES
Initial Post Discharge Follow Up   Discharge Date: 8/29/17  Contact Date: 8/30/2017    Consent Verification:  Assessment Completed With: Caregiver: Lizbet FRENCH Permission received per patient?  written  HIPAA Verified?   Yes    Discharge Dx:  Non-ST e

## 2017-08-31 ENCOUNTER — OFFICE VISIT (OUTPATIENT)
Dept: FAMILY MEDICINE CLINIC | Facility: CLINIC | Age: 77
End: 2017-08-31

## 2017-08-31 VITALS
HEART RATE: 72 BPM | TEMPERATURE: 97 F | RESPIRATION RATE: 16 BRPM | BODY MASS INDEX: 27.79 KG/M2 | SYSTOLIC BLOOD PRESSURE: 142 MMHG | WEIGHT: 175 LBS | DIASTOLIC BLOOD PRESSURE: 70 MMHG | HEIGHT: 66.5 IN

## 2017-08-31 DIAGNOSIS — H91.91 HEARING LOSS OF RIGHT EAR, UNSPECIFIED HEARING LOSS TYPE: ICD-10-CM

## 2017-08-31 DIAGNOSIS — R07.9 ACUTE CHEST PAIN: Primary | ICD-10-CM

## 2017-08-31 DIAGNOSIS — IMO0001 UNCONTROLLED TYPE 2 DIABETES MELLITUS WITHOUT COMPLICATION, WITHOUT LONG-TERM CURRENT USE OF INSULIN: ICD-10-CM

## 2017-08-31 DIAGNOSIS — I10 ESSENTIAL HYPERTENSION: ICD-10-CM

## 2017-08-31 DIAGNOSIS — I21.4 NSTEMI (NON-ST ELEVATED MYOCARDIAL INFARCTION) (HCC): ICD-10-CM

## 2017-08-31 DIAGNOSIS — I48.91 ATRIAL FIBRILLATION WITH RAPID VENTRICULAR RESPONSE (HCC): ICD-10-CM

## 2017-08-31 PROCEDURE — 99215 OFFICE O/P EST HI 40 MIN: CPT | Performed by: FAMILY MEDICINE

## 2017-09-07 ENCOUNTER — PRIOR ORIGINAL RECORDS (OUTPATIENT)
Dept: OTHER | Age: 77
End: 2017-09-07

## 2017-09-07 ENCOUNTER — HOSPITAL ENCOUNTER (OUTPATIENT)
Facility: HOSPITAL | Age: 77
Setting detail: OBSERVATION
Discharge: HOME | End: 2017-09-09
Attending: EMERGENCY MEDICINE | Admitting: HOSPITALIST
Payer: MEDICAID

## 2017-09-07 ENCOUNTER — APPOINTMENT (OUTPATIENT)
Dept: GENERAL RADIOLOGY | Facility: HOSPITAL | Age: 77
End: 2017-09-07
Attending: EMERGENCY MEDICINE
Payer: MEDICAID

## 2017-09-07 DIAGNOSIS — I48.91 ATRIAL FIBRILLATION WITH RAPID VENTRICULAR RESPONSE (HCC): ICD-10-CM

## 2017-09-07 DIAGNOSIS — R07.9 ACUTE CHEST PAIN: Primary | ICD-10-CM

## 2017-09-07 LAB
ALBUMIN SERPL-MCNC: 4 G/DL (ref 3.5–4.8)
ALP LIVER SERPL-CCNC: 123 U/L (ref 45–117)
ALT SERPL-CCNC: 27 U/L (ref 17–63)
AST SERPL-CCNC: 22 U/L (ref 15–41)
ATRIAL RATE: 267 BPM
BASOPHILS # BLD AUTO: 0.01 X10(3) UL (ref 0–0.1)
BASOPHILS NFR BLD AUTO: 0.1 %
BILIRUB SERPL-MCNC: 0.8 MG/DL (ref 0.1–2)
BUN BLD-MCNC: 22 MG/DL (ref 8–20)
CALCIUM BLD-MCNC: 9.7 MG/DL (ref 8.3–10.3)
CHLORIDE: 102 MMOL/L (ref 101–111)
CO2: 24 MMOL/L (ref 22–32)
CREAT BLD-MCNC: 1.29 MG/DL (ref 0.7–1.3)
EOSINOPHIL # BLD AUTO: 0.05 X10(3) UL (ref 0–0.3)
EOSINOPHIL NFR BLD AUTO: 0.7 %
ERYTHROCYTE [DISTWIDTH] IN BLOOD BY AUTOMATED COUNT: 19.1 % (ref 11.5–16)
EST. AVERAGE GLUCOSE BLD GHB EST-MCNC: 203 MG/DL (ref 68–126)
GLUCOSE BLD-MCNC: 157 MG/DL (ref 65–99)
GLUCOSE BLD-MCNC: 173 MG/DL (ref 65–99)
GLUCOSE BLD-MCNC: 182 MG/DL (ref 70–99)
HBA1C MFR BLD HPLC: 8.7 % (ref ?–5.7)
HCT VFR BLD AUTO: 36.6 % (ref 37–53)
HGB BLD-MCNC: 11.6 G/DL (ref 13–17)
IMMATURE GRANULOCYTE COUNT: 0.03 X10(3) UL (ref 0–1)
IMMATURE GRANULOCYTE RATIO %: 0.4 %
LYMPHOCYTES # BLD AUTO: 1.72 X10(3) UL (ref 0.9–4)
LYMPHOCYTES NFR BLD AUTO: 24.5 %
M PROTEIN MFR SERPL ELPH: 8.4 G/DL (ref 6.1–8.3)
MCH RBC QN AUTO: 20 PG (ref 27–33.2)
MCHC RBC AUTO-ENTMCNC: 31.7 G/DL (ref 31–37)
MCV RBC AUTO: 63.2 FL (ref 80–99)
MONOCYTES # BLD AUTO: 0.69 X10(3) UL (ref 0.1–0.6)
MONOCYTES NFR BLD AUTO: 9.8 %
NEUTROPHIL ABS PRELIM: 4.53 X10 (3) UL (ref 1.3–6.7)
NEUTROPHILS # BLD AUTO: 4.53 X10(3) UL (ref 1.3–6.7)
NEUTROPHILS NFR BLD AUTO: 64.5 %
PLATELET # BLD AUTO: 184 10(3)UL (ref 150–450)
POTASSIUM SERPL-SCNC: 4.8 MMOL/L (ref 3.6–5.1)
Q-T INTERVAL: 322 MS
QRS DURATION: 102 MS
QTC CALCULATION (BEZET): 455 MS
R AXIS: -46 DEGREES
RBC # BLD AUTO: 5.79 X10(6)UL (ref 3.8–5.8)
RED CELL DISTRIBUTION WIDTH-SD: 38.1 FL (ref 35.1–46.3)
SODIUM SERPL-SCNC: 135 MMOL/L (ref 136–144)
T AXIS: 93 DEGREES
TROPONIN: 0.29 NG/ML (ref ?–0.05)
TROPONIN: 0.33 NG/ML (ref ?–0.05)
TROPONIN: 0.34 NG/ML (ref ?–0.05)
VENTRICULAR RATE: 120 BPM
WBC # BLD AUTO: 7 X10(3) UL (ref 4–13)

## 2017-09-07 PROCEDURE — 99220 INITIAL OBSERVATION CARE,LEVL III: CPT | Performed by: HOSPITALIST

## 2017-09-07 PROCEDURE — 71010 XR CHEST AP PORTABLE  (CPT=71010): CPT | Performed by: EMERGENCY MEDICINE

## 2017-09-07 RX ORDER — ASPIRIN 325 MG
325 TABLET ORAL DAILY
Status: DISCONTINUED | OUTPATIENT
Start: 2017-09-07 | End: 2017-09-09

## 2017-09-07 RX ORDER — NITROGLYCERIN 0.4 MG/1
0.4 TABLET SUBLINGUAL EVERY 5 MIN PRN
Status: DISCONTINUED | OUTPATIENT
Start: 2017-09-07 | End: 2017-09-09

## 2017-09-07 RX ORDER — AMLODIPINE BESYLATE 5 MG/1
10 TABLET ORAL DAILY
Status: DISCONTINUED | OUTPATIENT
Start: 2017-09-07 | End: 2017-09-09

## 2017-09-07 RX ORDER — DEXTROSE MONOHYDRATE 25 G/50ML
50 INJECTION, SOLUTION INTRAVENOUS
Status: DISCONTINUED | OUTPATIENT
Start: 2017-09-07 | End: 2017-09-09

## 2017-09-07 RX ORDER — DILTIAZEM HYDROCHLORIDE 5 MG/ML
20 INJECTION INTRAVENOUS ONCE
Status: COMPLETED | OUTPATIENT
Start: 2017-09-07 | End: 2017-09-07

## 2017-09-07 RX ORDER — ACETAMINOPHEN 325 MG/1
650 TABLET ORAL EVERY 6 HOURS PRN
Status: DISCONTINUED | OUTPATIENT
Start: 2017-09-07 | End: 2017-09-09

## 2017-09-07 RX ORDER — AMIODARONE HYDROCHLORIDE 200 MG/1
400 TABLET ORAL 2 TIMES DAILY WITH MEALS
Status: DISCONTINUED | OUTPATIENT
Start: 2017-09-07 | End: 2017-09-08

## 2017-09-07 RX ORDER — CLOPIDOGREL BISULFATE 75 MG/1
75 TABLET ORAL DAILY
Status: DISCONTINUED | OUTPATIENT
Start: 2017-09-07 | End: 2017-09-09

## 2017-09-07 RX ORDER — ATORVASTATIN CALCIUM 40 MG/1
40 TABLET, FILM COATED ORAL NIGHTLY
Status: DISCONTINUED | OUTPATIENT
Start: 2017-09-07 | End: 2017-09-09

## 2017-09-07 RX ORDER — METOPROLOL SUCCINATE 50 MG/1
50 TABLET, EXTENDED RELEASE ORAL
Status: DISCONTINUED | OUTPATIENT
Start: 2017-09-07 | End: 2017-09-08

## 2017-09-07 RX ORDER — ONDANSETRON 2 MG/ML
4 INJECTION INTRAMUSCULAR; INTRAVENOUS EVERY 4 HOURS PRN
Status: DISCONTINUED | OUTPATIENT
Start: 2017-09-07 | End: 2017-09-08

## 2017-09-07 NOTE — PLAN OF CARE
CARDIOVASCULAR - ADULT    • Maintains optimal cardiac output and hemodynamic stability Progressing    • Absence of cardiac arrhythmias or at baseline Progressing        Assumed patient care around 0930.  Patient came through ED this AM with chest pressure t

## 2017-09-07 NOTE — ED PROVIDER NOTES
Patient Seen in: [facility] Emergency Department    History   Patient presents with:  Chest Pain Angina (cardiovascular)    Stated Complaint: CP    HPI    52-year-old male presents emergency department who started having some chest pressure at aroun Physical Exam    Vital signs reviewed  General appearance: Patient is alert and in no acute distress  HEENT: Pupils equal react to light extraocular muscles intact no scleral icterus, mucous membranes are moist, there is no erythema or exudate in t RAINBOW DRAW GOLD     EKG    Rate, intervals and axes as noted on EKG Report. Rate: 120  Rhythm: Atrial Fibrillation  Reading: Atrial fib with RVR. There is some inverted T waves compared to previous EKG. Patient was given 20 mg of Cardizem.   Liborio Moeller

## 2017-09-07 NOTE — PROGRESS NOTES
HPI:    Patient ID: Alba Quiñones is a 69year old male. HPI  Patient presents with:  Hospital F/U  Patient is here for follow-up of a hospitalization for chest pain. Patient was diagnosed with atrial fibrillation and non-ST elevation MI.   He had plus deep tendon reflexes in all four extremities, gait is normal, negative Romberg sign, coordination with finger to nose and heel to shin intact. No focal neurologic deficit noted.   Psychiatric:  mood and affect are normal, no apparent thought disorder,

## 2017-09-07 NOTE — H&P
[facility]  History and Physical     Lucita Lucretia Patient Status:  Observation    1940 MRN [id number]   [facility] 2NE-A Attending Mynor De Leon MD   Hosp Day # 0 PCP Samuel Hassan MD     Chief Complaint: CP    Hist current facility-administered medications on file prior to encounter. Current Outpatient Prescriptions on File Prior to Encounter:  Metoprolol Succinate ER 50 MG Oral Tablet 24 Hr Take 1 tablet (50 mg total) by mouth Daily Beta Blocker.  Disp: 30 tablet R acute distress. Alert and oriented x 3. HEENT: Normocephalic, atraumatic. EOM-I. Anicteric. Neck: No lymphadenopathy. No JVD. No carotid bruits. Respiratory: Good inspiratory effort. Clear to auscultation bilaterally. No rhonchi.   Cardiovascular: S1, S

## 2017-09-07 NOTE — CONSULTS
[facility]  Cardiology Consultation    [facility] Patient Status:  Emergency    1940 MRN [id number]   Location [address] Attending Eliana Oconnor MD   Hosp Day # 0 PCP Jeimy Hull MD     Reason for Consultat Max:98 °F (36.7 °C)     No intake or output data in the 24 hours ending 09/07/17 0930  Wt Readings from Last 3 Encounters:  09/07/17 : 175 lb (79.4 kg)  08/31/17 : 175 lb (79.4 kg)  08/29/17 : 171 lb 4.8 oz (77.7 kg)      Physical Exam:  General: Alert and

## 2017-09-07 NOTE — ED INITIAL ASSESSMENT (HPI)
PT WITH CHEST PRESSURE THAT STARTED AT 0330. PAIN A 5 TOOK NITRO SL AND NOW A 3. PAIN IS NONE RADIATING. DENIES JACLYN OR DIAPHORESIS.

## 2017-09-08 LAB
CHOLEST SMN-MCNC: 81 MG/DL (ref ?–200)
GLUCOSE BLD-MCNC: 141 MG/DL (ref 65–99)
GLUCOSE BLD-MCNC: 145 MG/DL (ref 65–99)
GLUCOSE BLD-MCNC: 204 MG/DL (ref 65–99)
GLUCOSE BLD-MCNC: 222 MG/DL (ref 65–99)
GLUCOSE BLD-MCNC: 245 MG/DL (ref 65–99)
HDLC SERPL-MCNC: 28 MG/DL (ref 45–?)
HDLC SERPL: 2.89 {RATIO} (ref ?–4.97)
LDLC SERPL CALC-MCNC: 38 MG/DL (ref ?–130)
LDLC SERPL-MCNC: 15 MG/DL (ref 5–40)
NONHDLC SERPL-MCNC: 53 MG/DL (ref ?–130)
TRIGLYCERIDES: 74 MG/DL (ref ?–150)
TROPONIN: 0.27 NG/ML (ref ?–0.05)

## 2017-09-08 PROCEDURE — 99225 SUBSEQUENT OBSERVATION CARE: CPT | Performed by: HOSPITALIST

## 2017-09-08 RX ORDER — METOPROLOL SUCCINATE 25 MG/1
25 TABLET, EXTENDED RELEASE ORAL
Status: DISCONTINUED | OUTPATIENT
Start: 2017-09-09 | End: 2017-09-09

## 2017-09-08 NOTE — PLAN OF CARE
CARDIOVASCULAR - ADULT    • Maintains optimal cardiac output and hemodynamic stability Progressing    • Absence of cardiac arrhythmias or at baseline Progressing          Pt axox4, speaks Lonny.  Daughter at bedside spending the night who translates for fat

## 2017-09-08 NOTE — CONSULTS
[facility]  Report of Consultation    Florecita Lawrence Patient Status:  Observation    1940 MRN [id number]   [facility] 2NE-A Attending Sally Kay MD   Hosp Day # 0 PCP Denisha Saavedra MD hyperlipidemia    • Stroke [facility])    • Type II or unspecified type diabetes mellitus without mention of complication, not stated as uncontrolled    • Unspecified essential hypertension    • Visual impairment      Past Surgical History:  No date: ANGIOGRAM amiodarone HCl (PACERONE) tab 400 mg, 400 mg, Oral, BID with meals    Review of Systems:  All systems were reviewed and are negative except as described above in HPI.     Physical Exam:  Blood pressure 134/72, pulse 59, temperature 98 °F (36.7 °C), temperat

## 2017-09-08 NOTE — HISTORICAL OFFICE NOTE
Rin Misael  [phone]  : 1940  ACCOUNT:  [id number]  PCP: Brandin Kuo M.D. CARDIOLOGIST: Tab Lizama M.D.   Hospital: [facility]  Admitted: 2017  Discharged:  2017    DISCHARGE SUMMARY    DISCHARGE DIAGNOSES:  1.  Non- setting. DISCHARGE INSTRUCTIONS: The patient will follow up with Dr. Danny Taylor next Thursday, 17.     MARTIN Beaver - DD: 17 - DT: 17 - Job ID: [id number]               PAWAN DIAZ  : 1940  ACCOUNT:  [id number]  15 90% lesion; in November 2015 it was a 99% lesion. The patient did well. He was discharged home. He has had no chest discomfort and has not used any nitroglycerin since then.  Back in November 2015 he had transient atrial fibrillation in a lab that went away or enviornmental allergies. PAST HISTORY: Type 2 DM, Anemia, Vit D def.,transient afib during cath: amio iv and dc cv in lab.     PAST CV HISTORY: 2015 aa nl but with plaque, dyslipidemia, hypertension; abnl ufct 2015 with score 1456, laser atherectomy marginal branches, even though no new stents were placed, I would suggest that he should not stop his Plavix for an additional six months.  I am more than willing to allow Dr. Sasha Gonzalez to perform an upper endoscopy and colonoscopy without stopping aspirin 2.5-500M  2 by mouth in the morning, 1 by mout     11/02/15 Lisinopril            40MG      1 by mouth daily                         11/02/15 Tradjenta             5MG       1 by mouth daily                           Severa Galla, M.D.  MD/omari - DD: 11/07

## 2017-09-08 NOTE — PAYOR COMM NOTE
--------------  ADMISSION REVIEW     Payor: [employer]  Subscriber #:  [id number]  Authorization Number: N/A    Admit date: 9/7/17       Admitting Physician: Hamida Michael MD  Attending Physician:  Hamida Michael MD  Primary Care Physici pharynx  Neck: Supple no JVD no lymphadenopathy no meningismus no carotid bruit  CV: Tachycardic, irregular irregular respiratory: Clear to auscultation bilaterally no crackles no wheezes no accessory muscle use  Abdomen: Soft nontender nondistended, no re denies acute leg swelling or calf pain. He denies fevers or chills or productive cough. Denies abdominal pain, nausea or vomiting. [MD.2]    Allergies: No Known Allergies  Medications:       Current Outpatient Prescriptions on File Prior to Encounter:  Me °C) (Oral)   Resp 18   Ht 170.2 cm (5' 7\")   Wt 169 lb 1.5 oz (76.7 kg)   SpO2 100%   BMI 26.48 kg/m²    General: No acute distress. Alert and oriented x 3. HEENT: Normocephalic, atraumatic. EOM-I. Anicteric. Neck: No lymphadenopathy. No JVD.  No carotid Given 400 mg Oral Juanis Manning RN    9/7/2017 1841 Given 400 mg Oral Lucrecia Yoon RN      AmLODIPine Besylate (NORVASC) tab 10 mg     Date Action Dose Route User    9/8/2017 0842 Given 10 mg Oral Juanis Manning RN    9/7/2017 1246 Given 10 mg with RVR - patient converted to SR with IV cardizem in ED                          - compliant with Xarelto at home  Elevated troponin - likely due to Afib with RVR  CAD s/p recent stenting with Dr. Jesse Mooney  HTN  HLP  DM2     Recommendations:  - admit to ob

## 2017-09-08 NOTE — PLAN OF CARE
Patient is alert and oriented. Saturates well on room air, NSR/SB on tele. Patient denies any chest pain or SOB. Ambulates ad janae. Patient is not english speaking, only basics are understood. Daughter in law translated.  Per Dr. Frank Sharma will need to stay one

## 2017-09-08 NOTE — PROGRESS NOTES
[facility]PITAL[facility]  Progress Note     [facility] Status:  Observation    1940 MRN [id number]   [facility] 2NE-A Attending Anirudh Tavera MD   Hosp Day # 0 PCP Chantel Barcenas MD     Chief Complaint: AFib  S:  Patient detemir  10 Units Subcutaneous Daily   • Insulin Aspart Pen  1-68 Units Subcutaneous TID CC     ASSESSMENT / PLAN:   1. Afib RVR- now in SR in 50s which is her baseline   1. Xarelto  2. Amio DC and now on Multaq  3. BB  4. Cardio recs appreciated  2.  Ishan Cohen

## 2017-09-09 ENCOUNTER — HOSPITAL ENCOUNTER (OUTPATIENT)
Dept: CV DIAGNOSTICS | Facility: HOSPITAL | Age: 77
Discharge: HOME | End: 2017-09-09
Attending: CLINICAL NURSE SPECIALIST
Payer: MEDICAID

## 2017-09-09 VITALS
RESPIRATION RATE: 17 BRPM | TEMPERATURE: 99 F | SYSTOLIC BLOOD PRESSURE: 128 MMHG | HEIGHT: 67 IN | WEIGHT: 169.06 LBS | OXYGEN SATURATION: 100 % | DIASTOLIC BLOOD PRESSURE: 67 MMHG | BODY MASS INDEX: 26.53 KG/M2 | HEART RATE: 55 BPM

## 2017-09-09 DIAGNOSIS — I48.91 ATRIAL FIBRILLATION WITH RAPID VENTRICULAR RESPONSE (HCC): ICD-10-CM

## 2017-09-09 LAB
GLUCOSE BLD-MCNC: 120 MG/DL (ref 65–99)
GLUCOSE BLD-MCNC: 223 MG/DL (ref 65–99)

## 2017-09-09 PROCEDURE — 93226 XTRNL ECG REC<48 HR SCAN A/R: CPT | Performed by: INTERNAL MEDICINE

## 2017-09-09 PROCEDURE — 93227 XTRNL ECG REC<48 HR R&I: CPT | Performed by: INTERNAL MEDICINE

## 2017-09-09 PROCEDURE — 99217 OBSERVATION CARE DISCHARGE: CPT | Performed by: HOSPITALIST

## 2017-09-09 PROCEDURE — 93225 XTRNL ECG REC<48 HRS REC: CPT | Performed by: INTERNAL MEDICINE

## 2017-09-09 RX ORDER — AMIODARONE HYDROCHLORIDE 200 MG/1
200 TABLET ORAL 2 TIMES DAILY WITH MEALS
Status: DISCONTINUED | OUTPATIENT
Start: 2017-09-09 | End: 2017-09-09

## 2017-09-09 RX ORDER — METOPROLOL SUCCINATE 50 MG/1
25 TABLET, EXTENDED RELEASE ORAL
Qty: 30 TABLET | Refills: 5 | Status: SHIPPED | OUTPATIENT
Start: 2017-09-09 | End: 2017-11-28

## 2017-09-09 RX ORDER — AMIODARONE HYDROCHLORIDE 200 MG/1
200 TABLET ORAL 2 TIMES DAILY WITH MEALS
Qty: 60 TABLET | Refills: 3 | Status: ON HOLD | OUTPATIENT
Start: 2017-09-09 | End: 2020-02-18

## 2017-09-09 NOTE — PLAN OF CARE
CARDIOVASCULAR - ADULT    • Maintains optimal cardiac output and hemodynamic stability Progressing    • Absence of cardiac arrhythmias or at baseline Progressing          Patient is alert and oriented. Patient denies pain or discomfort.  Patient up ambulati

## 2017-09-09 NOTE — PROGRESS NOTES
[facility] HOSPITALIST  Progress Note     Verner Bruin Patient Status:  Observation    1940 MRN [id number]   [facility] 2NE-A Attending Dipti Peña MD   Hosp Day # 0 PCP Tonia Turner MD     Chief Complaint: chest pain    S: Patie and HS   • insulin detemir  10 Units Subcutaneous Daily   • Insulin Aspart Pen  1-68 Units Subcutaneous TID CC       ASSESSMENT / PLAN:     1. Afib RVR- now in SR in 50s which is her baseline   1. Xarelto 15 mg po daily  2. BB  3. Cardio consult  2.  Pia Jeffries

## 2017-09-09 NOTE — DISCHARGE SUMMARY
[facility] HOSPITALIST  DISCHARGE SUMMARY     Ambrocio Verde Patient Status:  Observation    1940 MRN [id number]   [facility] 2NE-A Attending Nati Mcdonnell MD   Hosp Day # 0 PCP Siobhan Larios MD     Date of Admission: 2017  Date o Take 1 tablet (400 mg total) by mouth 2 (two) times daily with meals. Quantity:  60 tablet  Refills:  3        CHANGE how you take these medications      Instructions Prescription details   Metoprolol Succinate ER 50 MG Tb24  Commonly known as:   Topro 0.4 MG Subl  Commonly known as:  NITROSTAT      Place 0.4 mg under the tongue every 5 (five) minutes as needed for Chest pain. Refills:  0     rivaroxaban 15 MG Tabs  Commonly known as:  XARELTO      Take 1 tablet (15 mg total) by mouth daily with food.

## 2017-09-09 NOTE — PROGRESS NOTES
[facility] Progress Note    Subjective:  Felt a little \"uneasy\" around 4:30, no specifics, denied dizziness. Family at bedside interpreting. Spoke by speaker phone to daughter during my visit.      Objective:  /68 (BP Location: Left arm)   P

## 2017-09-11 ENCOUNTER — PATIENT OUTREACH (OUTPATIENT)
Dept: CASE MANAGEMENT | Age: 77
End: 2017-09-11

## 2017-09-11 ENCOUNTER — TELEPHONE (OUTPATIENT)
Dept: FAMILY MEDICINE CLINIC | Facility: CLINIC | Age: 77
End: 2017-09-11

## 2017-09-11 NOTE — TELEPHONE ENCOUNTER
Spoke to MNAOLO VANG for condition update today. MANLOO states pt is doing well since d/c. Has not had any further episodes of afib or chest pain since d/c. MANOLO states she will be contacting cardiology for HFU appt within 2 weeks.   No HFU appt

## 2017-09-11 NOTE — PROGRESS NOTES
Initial Post Discharge Follow Up   Discharge Date: 9/9/17  Contact Date: 9/11/2017    Consent Verification:  Assessment Completed With: Caregiver: Lizbet FRENCH Permission received per patient?  written  HIPAA Verified?   Yes    Discharge Dx:  wily with

## 2017-09-15 ENCOUNTER — PRIOR ORIGINAL RECORDS (OUTPATIENT)
Dept: OTHER | Age: 77
End: 2017-09-15

## 2017-09-25 DIAGNOSIS — I10 ESSENTIAL HYPERTENSION WITH GOAL BLOOD PRESSURE LESS THAN 130/80: ICD-10-CM

## 2017-09-25 RX ORDER — AMLODIPINE BESYLATE 10 MG/1
TABLET ORAL
Qty: 90 TABLET | Refills: 0 | Status: SHIPPED | OUTPATIENT
Start: 2017-09-25 | End: 2017-12-09

## 2017-09-25 NOTE — TELEPHONE ENCOUNTER
Requesting: Amlodipine 10mg  LOV: 8/31/17  RTC: 3 months  Last Labs: 9/7/17 - CMP  Filled: 12/22/16 #90 with 1 refills    Future Appointments  Date Time Provider Craig Be   12/9/2017 10:00 AM Philly Tolentino MD [facility] [address] [facility] [address]         Hyperte

## 2017-09-27 ENCOUNTER — PRIOR ORIGINAL RECORDS (OUTPATIENT)
Dept: OTHER | Age: 77
End: 2017-09-27

## 2017-09-29 ENCOUNTER — TELEPHONE (OUTPATIENT)
Dept: FAMILY MEDICINE CLINIC | Facility: CLINIC | Age: 77
End: 2017-09-29

## 2017-10-04 ENCOUNTER — TELEPHONE (OUTPATIENT)
Dept: FAMILY MEDICINE CLINIC | Facility: CLINIC | Age: 77
End: 2017-10-04

## 2017-10-04 NOTE — TELEPHONE ENCOUNTER
Patient's daughter in law states that Randall Christopher is not covered by the insurance. She was given a list of medications that were covered, which include Alogliptin, Glimepiride, and Glipizide.  She also states that these medications will need prior authorizatio

## 2017-10-04 NOTE — TELEPHONE ENCOUNTER
Patient daughter notified pf Dr. Dominick Vogel response and verbalized understanding.  She states she will call diabetic center tomorrow and schedule an appt, in the mean time she is requesting samples of Tradjenta 5mg to hold him off until his appt.    -please ad

## 2017-10-05 ENCOUNTER — PRIOR ORIGINAL RECORDS (OUTPATIENT)
Dept: OTHER | Age: 77
End: 2017-10-05

## 2017-10-13 ENCOUNTER — PRIOR ORIGINAL RECORDS (OUTPATIENT)
Dept: OTHER | Age: 77
End: 2017-10-13

## 2017-10-16 ENCOUNTER — PRIOR ORIGINAL RECORDS (OUTPATIENT)
Dept: OTHER | Age: 77
End: 2017-10-16

## 2017-10-23 ENCOUNTER — PRIOR ORIGINAL RECORDS (OUTPATIENT)
Dept: OTHER | Age: 77
End: 2017-10-23

## 2017-11-01 RX ORDER — GLYBURIDE-METFORMIN HYDROCHLORIDE 2.5; 5 MG/1; MG/1
TABLET ORAL
Qty: 270 TABLET | Refills: 0 | Status: SHIPPED | OUTPATIENT
Start: 2017-11-01 | End: 2017-11-28

## 2017-11-01 NOTE — TELEPHONE ENCOUNTER
Requested Medications   GLYBURID/METFORM 2.5-500MG TAB  Will file in chart as: GLYBURIDE-METFORMIN 2.5-500 MG Oral Tab  TAKE ONE TABLET BY MOUTH THREE TIMES DAILY       Disp: 270 tablet Refills: 0    Class: Normal Start: 11/1/2017   Documented:4 years ago

## 2017-11-06 DIAGNOSIS — IMO0001 UNCONTROLLED TYPE 2 DIABETES MELLITUS WITHOUT COMPLICATION, WITHOUT LONG-TERM CURRENT USE OF INSULIN: ICD-10-CM

## 2017-11-06 NOTE — TELEPHONE ENCOUNTER
Requested Medications   Linagliptin (TRADJENTA) 5 MG Oral Tab  TAKE ONE TABLET BY MOUTH IN THE MORNING       Disp: 28 tablet Refills: 0    Class: Normal Start: 11/6/2017   For: Uncontrolled type 2 diabetes mellitus without complication, without long-term c

## 2017-11-09 ENCOUNTER — PRIOR ORIGINAL RECORDS (OUTPATIENT)
Dept: OTHER | Age: 77
End: 2017-11-09

## 2017-11-10 ENCOUNTER — PRIOR ORIGINAL RECORDS (OUTPATIENT)
Dept: OTHER | Age: 77
End: 2017-11-10

## 2017-11-14 ENCOUNTER — PRIOR ORIGINAL RECORDS (OUTPATIENT)
Dept: OTHER | Age: 77
End: 2017-11-14

## 2017-11-27 ENCOUNTER — PRIOR ORIGINAL RECORDS (OUTPATIENT)
Dept: OTHER | Age: 77
End: 2017-11-27

## 2017-11-28 ENCOUNTER — PRIOR ORIGINAL RECORDS (OUTPATIENT)
Dept: OTHER | Age: 77
End: 2017-11-28

## 2017-11-28 ENCOUNTER — OFFICE VISIT (OUTPATIENT)
Dept: ENDOCRINOLOGY CLINIC | Facility: CLINIC | Age: 77
End: 2017-11-28

## 2017-11-28 VITALS
TEMPERATURE: 98 F | DIASTOLIC BLOOD PRESSURE: 62 MMHG | WEIGHT: 177 LBS | HEIGHT: 67 IN | HEART RATE: 68 BPM | BODY MASS INDEX: 27.78 KG/M2 | SYSTOLIC BLOOD PRESSURE: 136 MMHG | RESPIRATION RATE: 18 BRPM

## 2017-11-28 DIAGNOSIS — IMO0001 UNCONTROLLED TYPE 2 DIABETES MELLITUS WITHOUT COMPLICATION, WITHOUT LONG-TERM CURRENT USE OF INSULIN: Primary | ICD-10-CM

## 2017-11-28 PROCEDURE — 83036 HEMOGLOBIN GLYCOSYLATED A1C: CPT | Performed by: NURSE PRACTITIONER

## 2017-11-28 PROCEDURE — 99214 OFFICE O/P EST MOD 30 MIN: CPT | Performed by: NURSE PRACTITIONER

## 2017-11-28 RX ORDER — FUROSEMIDE 20 MG/1
TABLET ORAL
Refills: 0 | COMMUNITY
Start: 2017-11-10 | End: 2017-12-09

## 2017-11-28 RX ORDER — METOPROLOL SUCCINATE 25 MG/1
TABLET, EXTENDED RELEASE ORAL
Refills: 0 | COMMUNITY
Start: 2017-11-01 | End: 2019-11-08

## 2017-11-28 RX ORDER — GLIMEPIRIDE 4 MG/1
4 TABLET ORAL
Qty: 60 TABLET | Refills: 0 | Status: SHIPPED | OUTPATIENT
Start: 2017-11-28 | End: 2017-12-26

## 2017-11-28 NOTE — PROGRESS NOTES
CC: Patient presents with:  Diabetes: new pt. referred by PCP.       HISTORY:  Past Medical History:   Diagnosis Date   • Arrhythmia     AFIB   • ASHD (arteriosclerotic heart disease)    • Atherosclerosis of coronary artery    • Carotid artery stenosis    • amlodipinie, furosemide  SE denies     Hyperlipidemia:  LDL:  38      Medication:  Atorvastatin 40  SE denies     ROS:   Constitutional: Negative for fever, chills and fatigue. No distress. Eyes: Negative for visual disturbance.  Last eye exam needs appt daily. Use as directed., Disp: 100 each, Rfl: 6  •  Blood Glucose Monitoring Suppl (ISABELLA CONTOUR NEXT MONITOR) w/Device Does not apply Kit, 1 Device by Other route 2 (two) times daily.  Use as directed., Disp: 1 kit, Rfl: 0  •  Glucose Blood (ISABELLA CONTOUR bid, and start glimiperide 4 mg ac breakfast and dinner, if FBG running under 100 mg/dl on this regimen, to stop glimiperide ac dinner. To see RD to learn about diet eating high carbs and return in 4 weeks with BG log with varied times.  Needs review of met

## 2017-11-28 NOTE — PATIENT INSTRUCTIONS
Stop metaglip   Start metformin 1000 mg with breakfast and dinner   Start glimiperide 1 tablet before breakfast and dinner   Check sugar before each meal and bed alternate times   If you are below 100 in am do not take glimiperide at dinner   Return in 4 w

## 2017-11-29 ENCOUNTER — TELEPHONE (OUTPATIENT)
Dept: INTERNAL MEDICINE CLINIC | Facility: CLINIC | Age: 77
End: 2017-11-29

## 2017-11-29 NOTE — TELEPHONE ENCOUNTER
Pt daughter in law called stating patients sugar this AM was 62-after he had food it was 180 and now it is 208-SC changed his meds yesterday and they are worried about tonight if his sugars go higher what should they do?  Call to advise

## 2017-11-29 NOTE — TELEPHONE ENCOUNTER
Spoke to sharmila told her per SC not to take glimepiride in the night only in the morning and to call Friday with readings. Metformin he will take normally how he takes it.

## 2017-12-04 ENCOUNTER — MED REC SCAN ONLY (OUTPATIENT)
Dept: FAMILY MEDICINE CLINIC | Facility: CLINIC | Age: 77
End: 2017-12-04

## 2017-12-07 ENCOUNTER — PRIOR ORIGINAL RECORDS (OUTPATIENT)
Dept: OTHER | Age: 77
End: 2017-12-07

## 2017-12-07 ENCOUNTER — MYAURORA ACCOUNT LINK (OUTPATIENT)
Dept: OTHER | Age: 77
End: 2017-12-07

## 2017-12-08 ENCOUNTER — TELEPHONE (OUTPATIENT)
Dept: ENDOCRINOLOGY CLINIC | Facility: CLINIC | Age: 77
End: 2017-12-08

## 2017-12-08 ENCOUNTER — TELEPHONE (OUTPATIENT)
Dept: INTERNAL MEDICINE CLINIC | Facility: CLINIC | Age: 77
End: 2017-12-08

## 2017-12-08 DIAGNOSIS — IMO0001 UNCONTROLLED TYPE 2 DIABETES MELLITUS WITHOUT COMPLICATION, WITHOUT LONG-TERM CURRENT USE OF INSULIN: ICD-10-CM

## 2017-12-08 NOTE — TELEPHONE ENCOUNTER
Lizbet FRENCH, needs help to clarify Pt's medication list, needs a call back as soon as you can . Needs to contact pharmacy to make sure all is on the same page. If Pt needs to be on TRADJENTA, how will that affect his numbers.

## 2017-12-08 NOTE — TELEPHONE ENCOUNTER
Linagliptin (TRADJENTA) 5 MG Oral Tab is not covered by patients insurance-need new rx sent into pharm please

## 2017-12-09 ENCOUNTER — OFFICE VISIT (OUTPATIENT)
Dept: FAMILY MEDICINE CLINIC | Facility: CLINIC | Age: 77
End: 2017-12-09

## 2017-12-09 VITALS
TEMPERATURE: 98 F | DIASTOLIC BLOOD PRESSURE: 62 MMHG | OXYGEN SATURATION: 99 % | SYSTOLIC BLOOD PRESSURE: 124 MMHG | WEIGHT: 175 LBS | RESPIRATION RATE: 12 BRPM | HEART RATE: 63 BPM | BODY MASS INDEX: 27 KG/M2

## 2017-12-09 DIAGNOSIS — I10 ESSENTIAL HYPERTENSION: ICD-10-CM

## 2017-12-09 DIAGNOSIS — E78.00 HIGH CHOLESTEROL: ICD-10-CM

## 2017-12-09 DIAGNOSIS — IMO0001 UNCONTROLLED TYPE 2 DIABETES MELLITUS WITHOUT COMPLICATION, WITHOUT LONG-TERM CURRENT USE OF INSULIN: Primary | ICD-10-CM

## 2017-12-09 PROCEDURE — 99214 OFFICE O/P EST MOD 30 MIN: CPT | Performed by: FAMILY MEDICINE

## 2017-12-09 RX ORDER — FUROSEMIDE 40 MG/1
40 TABLET ORAL EVERY OTHER DAY
Refills: 0 | COMMUNITY
End: 2021-05-13 | Stop reason: ALTCHOICE

## 2017-12-09 RX ORDER — POTASSIUM CHLORIDE 750 MG/1
10 TABLET, FILM COATED, EXTENDED RELEASE ORAL DAILY
COMMUNITY
End: 2019-02-09

## 2017-12-09 RX ORDER — AMLODIPINE BESYLATE 5 MG/1
TABLET ORAL
Refills: 0 | COMMUNITY
Start: 2017-12-07 | End: 2017-12-18

## 2017-12-09 NOTE — PROGRESS NOTES
HPI:    Patient ID: Kavon Palma is a 69year old male. HPI  Patient is here for follow-up of diabetes and high blood pressure and coronary artery disease. He does see diabetic clinic [facility] and also cardiology.   Everything seems to be going well are non-inflamed and not swollen, oropharynx without erythema, swelling, or exudate, gingiva and lips without any apparent lesions.    Cardiac:  S1 S2 regular rate and rhythm, no murmur, gallop, or rub  Respiratory:  Bilaterally clear to auscultation, no ch

## 2017-12-14 ENCOUNTER — PRIOR ORIGINAL RECORDS (OUTPATIENT)
Dept: OTHER | Age: 77
End: 2017-12-14

## 2017-12-14 ENCOUNTER — DIABETIC EDUCATION (OUTPATIENT)
Dept: ENDOCRINOLOGY CLINIC | Facility: CLINIC | Age: 77
End: 2017-12-14

## 2017-12-14 VITALS — HEIGHT: 67 IN | WEIGHT: 175 LBS | BODY MASS INDEX: 27.47 KG/M2

## 2017-12-14 DIAGNOSIS — IMO0001 UNCONTROLLED TYPE 2 DIABETES MELLITUS WITHOUT COMPLICATION, WITHOUT LONG-TERM CURRENT USE OF INSULIN: Primary | ICD-10-CM

## 2017-12-14 PROCEDURE — 97802 MEDICAL NUTRITION INDIV IN: CPT | Performed by: DIETITIAN, REGISTERED

## 2017-12-14 NOTE — PROGRESS NOTES
Rupinder Dennis  [id number] was seen for Diabetic Medical Nutrition Therapy:    Date: 12/14/2017  Start time: 1pm End time: 2pm    Assessment: Ht 67\"   Wt 175 lb   BMI 27.41 kg/m²     HEMOGLOBIN A1C (%)   Date Value   11/28/2017 7.6 (A)   ----------

## 2017-12-18 ENCOUNTER — TELEPHONE (OUTPATIENT)
Dept: FAMILY MEDICINE CLINIC | Facility: CLINIC | Age: 77
End: 2017-12-18

## 2017-12-18 ENCOUNTER — OFFICE VISIT (OUTPATIENT)
Dept: FAMILY MEDICINE CLINIC | Facility: CLINIC | Age: 77
End: 2017-12-18

## 2017-12-18 ENCOUNTER — PRIOR ORIGINAL RECORDS (OUTPATIENT)
Dept: OTHER | Age: 77
End: 2017-12-18

## 2017-12-18 VITALS
RESPIRATION RATE: 16 BRPM | SYSTOLIC BLOOD PRESSURE: 134 MMHG | OXYGEN SATURATION: 99 % | HEIGHT: 67 IN | BODY MASS INDEX: 27.31 KG/M2 | HEART RATE: 82 BPM | WEIGHT: 174 LBS | DIASTOLIC BLOOD PRESSURE: 80 MMHG

## 2017-12-18 DIAGNOSIS — H65.93 FLUID LEVEL BEHIND TYMPANIC MEMBRANE OF BOTH EARS: ICD-10-CM

## 2017-12-18 DIAGNOSIS — R42 DIZZINESS: Primary | ICD-10-CM

## 2017-12-18 PROCEDURE — 99214 OFFICE O/P EST MOD 30 MIN: CPT | Performed by: FAMILY MEDICINE

## 2017-12-18 RX ORDER — AMLODIPINE BESYLATE 10 MG/1
TABLET ORAL
Refills: 2 | COMMUNITY
Start: 2017-11-27 | End: 2018-02-05

## 2017-12-18 RX ORDER — GLYBURIDE-METFORMIN HYDROCHLORIDE 2.5; 5 MG/1; MG/1
TABLET ORAL
Refills: 1 | COMMUNITY
Start: 2017-11-27 | End: 2017-12-27

## 2017-12-18 NOTE — TELEPHONE ENCOUNTER
Patient started having issues of dizzy spells since Saturday. Notices with position changes. Saw Dr. Lore Mcdaniels recently for swelling in his ankles. Meds were adjusted.   I advised it may be that his bp is dropping when he changes position, he may have a

## 2017-12-19 ENCOUNTER — LAB ENCOUNTER (OUTPATIENT)
Dept: LAB | Age: 77
End: 2017-12-19
Attending: FAMILY MEDICINE
Payer: MEDICAID

## 2017-12-19 ENCOUNTER — TELEPHONE (OUTPATIENT)
Dept: ENDOCRINOLOGY CLINIC | Facility: CLINIC | Age: 77
End: 2017-12-19

## 2017-12-19 ENCOUNTER — PRIOR ORIGINAL RECORDS (OUTPATIENT)
Dept: OTHER | Age: 77
End: 2017-12-19

## 2017-12-19 DIAGNOSIS — R42 DIZZINESS: ICD-10-CM

## 2017-12-19 DIAGNOSIS — H65.93 FLUID LEVEL BEHIND TYMPANIC MEMBRANE OF BOTH EARS: ICD-10-CM

## 2017-12-19 PROCEDURE — 80048 BASIC METABOLIC PNL TOTAL CA: CPT | Performed by: FAMILY MEDICINE

## 2017-12-19 PROCEDURE — 85027 COMPLETE CBC AUTOMATED: CPT | Performed by: FAMILY MEDICINE

## 2017-12-19 PROCEDURE — 36415 COLL VENOUS BLD VENIPUNCTURE: CPT | Performed by: FAMILY MEDICINE

## 2017-12-20 ENCOUNTER — TELEPHONE (OUTPATIENT)
Dept: FAMILY MEDICINE CLINIC | Facility: CLINIC | Age: 77
End: 2017-12-20

## 2017-12-20 RX ORDER — MECLIZINE HYDROCHLORIDE 25 MG/1
25 TABLET ORAL 3 TIMES DAILY PRN
Qty: 20 TABLET | Refills: 0 | Status: SHIPPED | OUTPATIENT
Start: 2017-12-20 | End: 2018-01-30

## 2017-12-20 NOTE — TELEPHONE ENCOUNTER
Dr. Nolan Neighbours called me back and reviewed the labs over the phone. He does not feel his dizziness is related to Cardiac issues. Fluid in the ear.  Wants patient to try Meclizine 12.5mg tid prn #20 and daughter is to call us Friday if this medication does not

## 2017-12-20 NOTE — TELEPHONE ENCOUNTER
I called to verify, I spoke with patient's daughter and he is still having dizziness. This is now day 5. He did complete the labs as ordered and they are on epic to review. Daughter wants to know how to proceed now.   She wanted you to be aware that her

## 2017-12-22 ENCOUNTER — TELEPHONE (OUTPATIENT)
Dept: FAMILY MEDICINE CLINIC | Facility: CLINIC | Age: 77
End: 2017-12-22

## 2017-12-22 DIAGNOSIS — R42 DIZZINESS: Primary | ICD-10-CM

## 2017-12-22 NOTE — TELEPHONE ENCOUNTER
Patient was given Meclizine 25 mg for dizziness. He is still having issues with dizziness - especially climbing stairs. Patient's daughter said it is not severe but wants to know how to proceed. Do you want to refer to ENT now?   I have pended order if

## 2017-12-22 NOTE — TELEPHONE ENCOUNTER
LM for patient's daughter to call back for information. Referral placed. MD does come to [place] as well.     EM [address]  Myesha 28

## 2017-12-22 NOTE — TELEPHONE ENCOUNTER
Daughter returned call and informed of all. She will call ENT and schedule her father. He will continue to use meclizine prn.

## 2017-12-26 DIAGNOSIS — IMO0001 UNCONTROLLED TYPE 2 DIABETES MELLITUS WITHOUT COMPLICATION, WITHOUT LONG-TERM CURRENT USE OF INSULIN: ICD-10-CM

## 2017-12-27 RX ORDER — GLIMEPIRIDE 4 MG/1
TABLET ORAL
Qty: 60 TABLET | Refills: 0 | Status: SHIPPED | OUTPATIENT
Start: 2017-12-27 | End: 2018-01-30

## 2017-12-28 NOTE — PROGRESS NOTES
+ anemia, stable  Creatinine is a little elevated, possible dehydration, push fluids and repeat in 2 BMP in 2 weeks

## 2018-01-02 ENCOUNTER — TELEPHONE (OUTPATIENT)
Dept: FAMILY MEDICINE CLINIC | Facility: CLINIC | Age: 78
End: 2018-01-02

## 2018-01-02 NOTE — PROGRESS NOTES
HPI:    Patient ID: Francis Camarillo is a 69year old male. HPI  Patient is here with complaint of dizzy spells off and on. He states mainly when he changes position. No chest pain shortness of breath.   Review of Systems  Negative except for the abo Cardiac:  S1 S2 regular rate and rhythm, no murmur, gallop, or rub  Respiratory:  Bilaterally clear to auscultation, no chest tenderness to palpation, no wheezing, no rhonchi, no rales, air entry is adequate, no accessory respiratory muscle use, no chest

## 2018-01-02 NOTE — TELEPHONE ENCOUNTER
Patient was a no show for his clari ttkhai with Dr. Navi Mccray LMOM for patient's daughter in law, Marky Moeller, in regards to the appt that he had for today. This is patient's first no show. Sangita Hodge

## 2018-01-04 LAB
ALBUMIN: 4 G/DL
ALKALINE PHOSPHATATE(ALK PHOS): 123 IU/L
BILIRUBIN TOTAL: 0.8 MG/DL
BUN: 22 MG/DL
CALCIUM: 9.7 MG/DL
CHLORIDE: 102 MEQ/L
CHOLESTEROL, TOTAL: 81 MG/DL
CREATININE, SERUM: 1.29 MG/DL
GLUCOSE: 182 MG/DL
HDL CHOLESTEROL: 28 MG/DL
HEMATOCRIT: 36.6 %
HEMOGLOBIN A1C: 8.7 %
HEMOGLOBIN: 11.6 G/DL
LDL CHOLESTEROL: 38 MG/DL
PLATELETS: 184 K/UL
POTASSIUM, SERUM: 4.8 MEQ/L
PROTEIN, TOTAL: 8.4 G/DL
RED BLOOD COUNT: 5.79 X 10-6/U
SGOT (AST): 22 IU/L
SGPT (ALT): 27 IU/L
SODIUM: 135 MEQ/L
TRIGLYCERIDES: 74 MG/DL
WHITE BLOOD COUNT: 7 X 10-3/U

## 2018-01-24 ENCOUNTER — PRIOR ORIGINAL RECORDS (OUTPATIENT)
Dept: OTHER | Age: 78
End: 2018-01-24

## 2018-01-26 ENCOUNTER — APPOINTMENT (OUTPATIENT)
Dept: LAB | Age: 78
End: 2018-01-26
Attending: FAMILY MEDICINE
Payer: MEDICAID

## 2018-01-26 ENCOUNTER — PRIOR ORIGINAL RECORDS (OUTPATIENT)
Dept: OTHER | Age: 78
End: 2018-01-26

## 2018-01-26 DIAGNOSIS — R79.89 ELEVATED SERUM CREATININE: ICD-10-CM

## 2018-01-26 LAB
BUN BLD-MCNC: 24 MG/DL (ref 8–20)
CALCIUM BLD-MCNC: 8.9 MG/DL (ref 8.3–10.3)
CHLORIDE: 106 MMOL/L (ref 101–111)
CO2: 26 MMOL/L (ref 22–32)
CREAT BLD-MCNC: 1.41 MG/DL (ref 0.7–1.3)
GLUCOSE BLD-MCNC: 112 MG/DL (ref 70–99)
POTASSIUM SERPL-SCNC: 4.7 MMOL/L (ref 3.6–5.1)
SODIUM SERPL-SCNC: 140 MMOL/L (ref 136–144)

## 2018-01-26 PROCEDURE — 36415 COLL VENOUS BLD VENIPUNCTURE: CPT | Performed by: FAMILY MEDICINE

## 2018-01-26 PROCEDURE — 80048 BASIC METABOLIC PNL TOTAL CA: CPT | Performed by: FAMILY MEDICINE

## 2018-01-30 DIAGNOSIS — IMO0001 UNCONTROLLED TYPE 2 DIABETES MELLITUS WITHOUT COMPLICATION, WITHOUT LONG-TERM CURRENT USE OF INSULIN: ICD-10-CM

## 2018-01-31 NOTE — TELEPHONE ENCOUNTER
MECLIZINE 25MG TAB  Will file in chart as: MECLIZINE HCL 25 MG Oral Tab  TAKE ONE TABLET BY MOUTH THREE TIMES DAILY AS NEEDED       Disp: 20 tablet Refills: 0    Class: Normal Start: 1/30/2018   Originally ordered: 1 month ago by MD Marvin Ross r

## 2018-02-01 RX ORDER — MECLIZINE HYDROCHLORIDE 25 MG/1
TABLET ORAL
Qty: 20 TABLET | Refills: 0 | Status: SHIPPED | OUTPATIENT
Start: 2018-02-01 | End: 2019-10-22

## 2018-02-01 RX ORDER — GLIMEPIRIDE 4 MG/1
TABLET ORAL
Qty: 60 TABLET | Refills: 0 | Status: SHIPPED | OUTPATIENT
Start: 2018-02-01 | End: 2018-02-05

## 2018-02-05 ENCOUNTER — TELEPHONE (OUTPATIENT)
Dept: ENDOCRINOLOGY CLINIC | Facility: CLINIC | Age: 78
End: 2018-02-05

## 2018-02-05 ENCOUNTER — TELEPHONE (OUTPATIENT)
Dept: FAMILY MEDICINE CLINIC | Facility: CLINIC | Age: 78
End: 2018-02-05

## 2018-02-05 ENCOUNTER — OFFICE VISIT (OUTPATIENT)
Dept: ENDOCRINOLOGY CLINIC | Facility: CLINIC | Age: 78
End: 2018-02-05

## 2018-02-05 VITALS
WEIGHT: 178 LBS | HEART RATE: 56 BPM | BODY MASS INDEX: 27.94 KG/M2 | HEIGHT: 67 IN | RESPIRATION RATE: 18 BRPM | SYSTOLIC BLOOD PRESSURE: 100 MMHG | TEMPERATURE: 98 F | DIASTOLIC BLOOD PRESSURE: 52 MMHG

## 2018-02-05 DIAGNOSIS — E78.00 HIGH CHOLESTEROL: ICD-10-CM

## 2018-02-05 DIAGNOSIS — IMO0001 UNCONTROLLED TYPE 2 DIABETES MELLITUS WITHOUT COMPLICATION, WITHOUT LONG-TERM CURRENT USE OF INSULIN: Primary | ICD-10-CM

## 2018-02-05 DIAGNOSIS — I10 ESSENTIAL HYPERTENSION: ICD-10-CM

## 2018-02-05 DIAGNOSIS — R79.89 ELEVATED SERUM CREATININE: Primary | ICD-10-CM

## 2018-02-05 PROCEDURE — 99213 OFFICE O/P EST LOW 20 MIN: CPT | Performed by: NURSE PRACTITIONER

## 2018-02-05 RX ORDER — GLIMEPIRIDE 4 MG/1
4 TABLET ORAL
Qty: 90 TABLET | Refills: 1 | Status: SHIPPED | OUTPATIENT
Start: 2018-02-05 | End: 2018-03-09

## 2018-02-05 RX ORDER — LANCETS
EACH MISCELLANEOUS
Refills: 1 | COMMUNITY
Start: 2018-02-05 | End: 2018-03-05

## 2018-02-05 RX ORDER — POTASSIUM CHLORIDE 10 MEQ/1
10 TABLET, EXTENDED RELEASE ORAL AS NEEDED
Refills: 2 | Status: ON HOLD | COMMUNITY
Start: 2018-02-05 | End: 2020-02-18

## 2018-02-05 RX ORDER — AMLODIPINE BESYLATE 5 MG/1
5 TABLET ORAL DAILY
Refills: 2 | COMMUNITY
Start: 2018-02-05 | End: 2021-05-13 | Stop reason: ALTCHOICE

## 2018-02-05 NOTE — PROGRESS NOTES
CC: No chief complaint on file.       HISTORY:  Past Medical History:   Diagnosis Date   • Arrhythmia     AFIB   • ASHD (arteriosclerotic heart disease)    • Atherosclerosis of coronary artery    • Carotid artery stenosis    • Cataract    • Coronary atheros Hyperlipidemia:  LDL:  38      Medication:  Atorvastatin 40  SE denies     ROS:   Constitutional: Negative for fever, chills and fatigue. No distress. Eyes: Negative for visual disturbance.  Last eye exam needs appt   Respiratory: Negative for cough, rivaroxaban 15 MG Oral Tab, Take 1 tablet (15 mg total) by mouth daily with food. , Disp: 30 tablet, Rfl: 5  •  Atorvastatin Calcium (LIPITOR) 40 MG Oral Tab, Take 40 mg by mouth nightly.  , Disp: , Rfl: 3  •  Clopidogrel Bisulfate (PLAVIX) 75 MG Oral Tab, Orders Placed This Encounter      CMP now      Lipids now      Hemoglobin A1C now      Microalb/Creat Ratio, now    Meds & Refills for this Visit:    Signed Prescriptions Disp Refills    MetFORMIN HCl 1000 MG Oral Tab 180 tablet 1      Sig: TAKE ONE

## 2018-02-06 ENCOUNTER — PRIOR ORIGINAL RECORDS (OUTPATIENT)
Dept: OTHER | Age: 78
End: 2018-02-06

## 2018-02-06 NOTE — TELEPHONE ENCOUNTER
Patient's daughter, Connor Estimable, given results. She will have patient continue to hydrate well and get re check of lab in 3 months. Order placed. She would like to pickup a copy of his labs - printed and left up front.

## 2018-02-06 NOTE — TELEPHONE ENCOUNTER
BMP shows slightly elevated but stable creatinine. We will continue to monitor. Recheck a basic metabolic panel in 3 months.

## 2018-02-08 ENCOUNTER — PRIOR ORIGINAL RECORDS (OUTPATIENT)
Dept: OTHER | Age: 78
End: 2018-02-08

## 2018-02-08 ENCOUNTER — MYAURORA ACCOUNT LINK (OUTPATIENT)
Dept: OTHER | Age: 78
End: 2018-02-08

## 2018-02-09 ENCOUNTER — PRIOR ORIGINAL RECORDS (OUTPATIENT)
Dept: OTHER | Age: 78
End: 2018-02-09

## 2018-02-10 ENCOUNTER — PRIOR ORIGINAL RECORDS (OUTPATIENT)
Dept: OTHER | Age: 78
End: 2018-02-10

## 2018-02-26 ENCOUNTER — TELEPHONE (OUTPATIENT)
Dept: INTERNAL MEDICINE CLINIC | Facility: CLINIC | Age: 78
End: 2018-02-26

## 2018-02-26 NOTE — TELEPHONE ENCOUNTER
Pt's sugars have been pretty high only at night and in the morning its really low. Highest its been in the evening its 390 and the lowest in the mornings its been 53. This morning its 68.  Please advise

## 2018-02-27 ENCOUNTER — PRIOR ORIGINAL RECORDS (OUTPATIENT)
Dept: OTHER | Age: 78
End: 2018-02-27

## 2018-03-05 ENCOUNTER — OFFICE VISIT (OUTPATIENT)
Dept: ENDOCRINOLOGY CLINIC | Facility: CLINIC | Age: 78
End: 2018-03-05

## 2018-03-05 VITALS
HEIGHT: 67 IN | DIASTOLIC BLOOD PRESSURE: 68 MMHG | RESPIRATION RATE: 18 BRPM | HEART RATE: 60 BPM | TEMPERATURE: 97 F | SYSTOLIC BLOOD PRESSURE: 130 MMHG | WEIGHT: 176 LBS | BODY MASS INDEX: 27.62 KG/M2

## 2018-03-05 DIAGNOSIS — IMO0001 UNCONTROLLED TYPE 2 DIABETES MELLITUS WITHOUT COMPLICATION, WITHOUT LONG-TERM CURRENT USE OF INSULIN: Primary | ICD-10-CM

## 2018-03-05 DIAGNOSIS — IMO0001 UNCONTROLLED TYPE 2 DIABETES MELLITUS WITHOUT COMPLICATION, WITHOUT LONG-TERM CURRENT USE OF INSULIN: ICD-10-CM

## 2018-03-05 DIAGNOSIS — E78.00 HIGH CHOLESTEROL: ICD-10-CM

## 2018-03-05 PROCEDURE — 99213 OFFICE O/P EST LOW 20 MIN: CPT | Performed by: NURSE PRACTITIONER

## 2018-03-05 RX ORDER — LOSARTAN POTASSIUM 50 MG/1
100 TABLET ORAL DAILY
Refills: 0 | Status: ON HOLD | COMMUNITY
Start: 2018-02-09 | End: 2020-02-18

## 2018-03-05 RX ORDER — NATEGLINIDE 120 MG/1
120 TABLET, COATED ORAL
Qty: 30 TABLET | Refills: 0 | Status: SHIPPED | OUTPATIENT
Start: 2018-03-05 | End: 2018-06-06

## 2018-03-05 RX ORDER — LANCETS
EACH MISCELLANEOUS
Qty: 100 EACH | Refills: 3 | Status: SHIPPED | OUTPATIENT
Start: 2018-03-05 | End: 2019-02-09

## 2018-03-05 NOTE — PROGRESS NOTES
CC: Patient presents with:  Diabetes: follow up. pt did bring log sheet.       HISTORY:  Past Medical History:   Diagnosis Date   • Arrhythmia     AFIB   • ASHD (arteriosclerotic heart disease)    • Atherosclerosis of coronary artery    • Carotid artery kerri Atorvastatin 40  SE denies due recheck    ROS:   Constitutional: Negative for fever, chills and fatigue. No distress. Eyes: Negative for visual disturbance.  Last eye exam needs appt Jean  Respiratory: Negative for cough, chest tightness, shortness of b ER 25 MG Oral Tablet 24 Hr, , Disp: , Rfl: 0  •  amiodarone HCl 200 MG Oral Tab, Take 1 tablet (200 mg total) by mouth 2 (two) times daily with meals.  Take twice daily for 2 weeks, then decrease to once daily, Disp: 60 tablet, Rfl: 3  •  rivaroxaban 15 MG 1000 mg bid, and stop glimiperide 4 mg ac dinner, change to Starlix 120 mg ac dinner. If able send report in 1 week how his BG are on this med, and to test at varied times to see if this is keeping BG controlled throughout day, to return in 12 weeks.  Lia

## 2018-03-05 NOTE — PATIENT INSTRUCTIONS
Stop glimiperide   Continue metformin 2 times a day with breakfast and dinner  Start starlix 120 mg before dinner daily   Test sugar before meals or 2 hours after meals 3 times daily   Send in update in 1 week   Have fasting labs and urine in next few days

## 2018-03-09 ENCOUNTER — TELEPHONE (OUTPATIENT)
Dept: INTERNAL MEDICINE CLINIC | Facility: CLINIC | Age: 78
End: 2018-03-09

## 2018-03-09 RX ORDER — GLIPIZIDE 5 MG/1
2.5 TABLET ORAL
Qty: 15 TABLET | Refills: 0 | Status: SHIPPED | OUTPATIENT
Start: 2018-03-09 | End: 2018-06-06

## 2018-03-09 NOTE — TELEPHONE ENCOUNTER
Pt DM meds were changed at the last appt. Rock Ocampo , Daughter-In-Law, calling to ask. She has concerns about his BS is going up to the 300's in the evening and dropping in the morning.  Pls call to discuss

## 2018-03-09 NOTE — TELEPHONE ENCOUNTER
Per SC take 2.5mg glipizide before lunch and stop starlix . Metformin stay the same. Check BS before and after dinner. Pt verbalize understanding.

## 2018-03-13 ENCOUNTER — TELEPHONE (OUTPATIENT)
Dept: INTERNAL MEDICINE CLINIC | Facility: CLINIC | Age: 78
End: 2018-03-13

## 2018-03-13 ENCOUNTER — APPOINTMENT (OUTPATIENT)
Dept: LAB | Age: 78
End: 2018-03-13
Attending: FAMILY MEDICINE
Payer: MEDICAID

## 2018-03-13 ENCOUNTER — PRIOR ORIGINAL RECORDS (OUTPATIENT)
Dept: OTHER | Age: 78
End: 2018-03-13

## 2018-03-13 DIAGNOSIS — IMO0001 UNCONTROLLED TYPE 2 DIABETES MELLITUS WITHOUT COMPLICATION, WITHOUT LONG-TERM CURRENT USE OF INSULIN: Primary | ICD-10-CM

## 2018-03-13 DIAGNOSIS — E78.00 HIGH CHOLESTEROL: ICD-10-CM

## 2018-03-13 DIAGNOSIS — IMO0001 UNCONTROLLED TYPE 2 DIABETES MELLITUS WITHOUT COMPLICATION, WITHOUT LONG-TERM CURRENT USE OF INSULIN: ICD-10-CM

## 2018-03-13 LAB
ALBUMIN SERPL-MCNC: 3.7 G/DL (ref 3.5–4.8)
ALP LIVER SERPL-CCNC: 96 U/L (ref 45–117)
ALT SERPL-CCNC: 63 U/L (ref 17–63)
AST SERPL-CCNC: 36 U/L (ref 15–41)
BILIRUB SERPL-MCNC: 0.7 MG/DL (ref 0.1–2)
BUN BLD-MCNC: 25 MG/DL (ref 8–20)
CALCIUM BLD-MCNC: 9.5 MG/DL (ref 8.3–10.3)
CHLORIDE: 105 MMOL/L (ref 101–111)
CHOLEST SMN-MCNC: 117 MG/DL (ref ?–200)
CO2: 28 MMOL/L (ref 22–32)
CREAT BLD-MCNC: 1.23 MG/DL (ref 0.7–1.3)
CREAT UR-SCNC: 53.2 MG/DL
EST. AVERAGE GLUCOSE BLD GHB EST-MCNC: 192 MG/DL (ref 68–126)
GLUCOSE BLD-MCNC: 108 MG/DL (ref 70–99)
HBA1C MFR BLD HPLC: 8.3 % (ref ?–5.7)
HDLC SERPL-MCNC: 46 MG/DL (ref 45–?)
HDLC SERPL: 2.54 {RATIO} (ref ?–4.97)
LDLC SERPL CALC-MCNC: 55 MG/DL (ref ?–130)
M PROTEIN MFR SERPL ELPH: 7.6 G/DL (ref 6.1–8.3)
MICROALBUMIN UR-MCNC: 9.62 MG/DL
MICROALBUMIN/CREAT 24H UR-RTO: 180.8 UG/MG (ref ?–30)
NONHDLC SERPL-MCNC: 71 MG/DL (ref ?–130)
POTASSIUM SERPL-SCNC: 4.9 MMOL/L (ref 3.6–5.1)
SODIUM SERPL-SCNC: 140 MMOL/L (ref 136–144)
TRIGL SERPL-MCNC: 78 MG/DL (ref ?–150)
VLDLC SERPL CALC-MCNC: 16 MG/DL (ref 5–40)

## 2018-03-13 PROCEDURE — 82043 UR ALBUMIN QUANTITATIVE: CPT | Performed by: NURSE PRACTITIONER

## 2018-03-13 PROCEDURE — 36415 COLL VENOUS BLD VENIPUNCTURE: CPT | Performed by: NURSE PRACTITIONER

## 2018-03-13 PROCEDURE — 82570 ASSAY OF URINE CREATININE: CPT | Performed by: NURSE PRACTITIONER

## 2018-03-13 PROCEDURE — 80061 LIPID PANEL: CPT | Performed by: NURSE PRACTITIONER

## 2018-03-13 PROCEDURE — 83036 HEMOGLOBIN GLYCOSYLATED A1C: CPT | Performed by: NURSE PRACTITIONER

## 2018-03-13 PROCEDURE — 80053 COMPREHEN METABOLIC PANEL: CPT | Performed by: NURSE PRACTITIONER

## 2018-03-13 NOTE — TELEPHONE ENCOUNTER
Called pt daughter and she would like to get a prescription for the freestyle luis sensor. Per SC they can get sensor 120 to start and 60 dollars with copay card and at [address] .

## 2018-03-13 NOTE — TELEPHONE ENCOUNTER
Daughter, Joseph Graham, states her dad's fingers are numb now from checking sugar so much. She was told by pharmacy that there is a patch he can wear on his arm that will help. He can wave something over the patch and it will check the sugars.   Please advis

## 2018-03-14 RX ORDER — FLASH GLUCOSE SENSOR
1 KIT MISCELLANEOUS ONCE
Qty: 1 DEVICE | Refills: 0 | Status: SHIPPED | OUTPATIENT
Start: 2018-03-14 | End: 2018-03-14

## 2018-03-14 RX ORDER — FUROSEMIDE 20 MG/1
TABLET ORAL
Refills: 1 | COMMUNITY
Start: 2018-03-06 | End: 2019-10-22

## 2018-03-14 RX ORDER — FLASH GLUCOSE SENSOR
3 KIT MISCELLANEOUS
Qty: 3 EACH | Refills: 11 | Status: SHIPPED | OUTPATIENT
Start: 2018-03-14 | End: 2019-02-09

## 2018-03-15 ENCOUNTER — DIABETIC EDUCATION (OUTPATIENT)
Dept: ENDOCRINOLOGY CLINIC | Facility: CLINIC | Age: 78
End: 2018-03-15

## 2018-03-15 VITALS — BODY MASS INDEX: 27.62 KG/M2 | HEIGHT: 67 IN | WEIGHT: 176 LBS

## 2018-03-15 DIAGNOSIS — IMO0001 UNCONTROLLED TYPE 2 DIABETES MELLITUS WITHOUT COMPLICATION, WITHOUT LONG-TERM CURRENT USE OF INSULIN: Primary | ICD-10-CM

## 2018-03-15 PROCEDURE — G0108 DIAB MANAGE TRN  PER INDIV: HCPCS | Performed by: DIETITIAN, REGISTERED

## 2018-03-15 NOTE — PROGRESS NOTES
Kavon Palma  [facility] was seen for Personal Continuous Glucose Monitoring Training/Start:    Date: 3/15/2018  Start time: 11:00 End time: 12:00    Sensor Type:  Freestyle Silvina    Patient verbalized understanding of the followin.  Differenc

## 2018-03-16 ENCOUNTER — TELEPHONE (OUTPATIENT)
Dept: ENDOCRINOLOGY CLINIC | Facility: CLINIC | Age: 78
End: 2018-03-16

## 2018-03-16 DIAGNOSIS — IMO0001 UNCONTROLLED TYPE 2 DIABETES MELLITUS WITHOUT COMPLICATION, WITHOUT LONG-TERM CURRENT USE OF INSULIN: Primary | ICD-10-CM

## 2018-03-16 RX ORDER — ALOGLIPTIN 12.5 MG/1
12.5 TABLET, FILM COATED ORAL DAILY
Qty: 30 TABLET | Refills: 2 | Status: SHIPPED | OUTPATIENT
Start: 2018-03-16 | End: 2018-04-15

## 2018-03-16 NOTE — TELEPHONE ENCOUNTER
Daughter called concerned about Bg have been adjusting meds   Add januvia 100 mg daily, no AE see if this helps, if BG respond and he starts having lows, stop glipizide, takes 3 weeks to take affect   Maybe can send update   Thanks  Luisa PLUMMER,CDE

## 2018-03-16 NOTE — TELEPHONE ENCOUNTER
Called pt back told her what is going on with medication pt likes his Kaleigh Padron. Also does not want to change medication No further questions were asked.

## 2018-03-16 NOTE — TELEPHONE ENCOUNTER
Called pt back spoke to daughter in law and wanted to know if he has to take the new medication that was sent to pharmacy today and I told her no that if they want to keep him on what he is on now she said yes because she feels like it is working for him.

## 2018-06-06 DIAGNOSIS — IMO0001 UNCONTROLLED TYPE 2 DIABETES MELLITUS WITHOUT COMPLICATION, WITHOUT LONG-TERM CURRENT USE OF INSULIN: Primary | ICD-10-CM

## 2018-06-06 RX ORDER — GLIPIZIDE 5 MG/1
TABLET ORAL
Qty: 45 TABLET | Refills: 0 | Status: SHIPPED | OUTPATIENT
Start: 2018-06-06 | End: 2018-12-03

## 2018-06-06 NOTE — TELEPHONE ENCOUNTER
Pt in [facility] until July but needs f/u visit on return please call or leave message with Daughter regarding this thanks

## 2018-06-06 NOTE — TELEPHONE ENCOUNTER
Unable to come in sooner.  Future Appointments  Date Time Provider Craig [place]   7/11/2018 5:30 PM Jason Shen NP [facility] [address]

## 2018-07-13 ENCOUNTER — TELEPHONE (OUTPATIENT)
Dept: FAMILY MEDICINE CLINIC | Facility: CLINIC | Age: 78
End: 2018-07-13

## 2018-07-13 NOTE — TELEPHONE ENCOUNTER
PT's daughter in law returned call and states that pt was around sick son who had a bad cough and fever, pt is now presenting with a cough, no fever or chills. PT has Shortness of breath when he has cough, otherwise patient is okay.  Patient has not tried a

## 2018-07-14 ENCOUNTER — TELEPHONE (OUTPATIENT)
Dept: FAMILY MEDICINE CLINIC | Facility: CLINIC | Age: 78
End: 2018-07-14

## 2018-07-14 NOTE — TELEPHONE ENCOUNTER
Spoke to daughter in law, at 12:08pm. Pt will need to reschedule due to being over 7 minutes late for a 15 minute appt.  25.00 No show charge applies

## 2018-07-30 ENCOUNTER — PRIOR ORIGINAL RECORDS (OUTPATIENT)
Dept: OTHER | Age: 78
End: 2018-07-30

## 2018-07-31 ENCOUNTER — PRIOR ORIGINAL RECORDS (OUTPATIENT)
Dept: OTHER | Age: 78
End: 2018-07-31

## 2018-08-22 ENCOUNTER — TELEPHONE (OUTPATIENT)
Dept: INTERNAL MEDICINE CLINIC | Facility: CLINIC | Age: 78
End: 2018-08-22

## 2018-08-22 NOTE — TELEPHONE ENCOUNTER
Daughter, celine Serrano per [id number], is requesting someone order an A1C for her father. She states his last A1C was 3/13/18. She is wanting to schedule him with someone as soon as possible. He has a history of heart issues.   She states Oct and Nov in the pa

## 2018-08-23 ENCOUNTER — APPOINTMENT (OUTPATIENT)
Dept: LAB | Age: 78
End: 2018-08-23
Attending: NURSE PRACTITIONER
Payer: MEDICAID

## 2018-08-23 ENCOUNTER — PRIOR ORIGINAL RECORDS (OUTPATIENT)
Dept: OTHER | Age: 78
End: 2018-08-23

## 2018-08-23 ENCOUNTER — MYAURORA ACCOUNT LINK (OUTPATIENT)
Dept: OTHER | Age: 78
End: 2018-08-23

## 2018-08-23 DIAGNOSIS — IMO0001 UNCONTROLLED TYPE 2 DIABETES MELLITUS WITHOUT COMPLICATION, WITHOUT LONG-TERM CURRENT USE OF INSULIN: ICD-10-CM

## 2018-08-23 LAB
EST. AVERAGE GLUCOSE BLD GHB EST-MCNC: 163 MG/DL (ref 68–126)
HBA1C MFR BLD HPLC: 7.3 % (ref ?–5.7)

## 2018-08-23 PROCEDURE — 83036 HEMOGLOBIN GLYCOSYLATED A1C: CPT

## 2018-08-23 PROCEDURE — 36415 COLL VENOUS BLD VENIPUNCTURE: CPT

## 2018-08-23 NOTE — TELEPHONE ENCOUNTER
Pts daughter called back and stated that she is at the [facility] in [place] and is requesting a lab order for an A1C for the pt.  I advised that there was an order for an A1C in the system from [facility] on 18 and it has not been used and does not  unt

## 2018-08-24 ENCOUNTER — PRIOR ORIGINAL RECORDS (OUTPATIENT)
Dept: OTHER | Age: 78
End: 2018-08-24

## 2018-08-24 LAB
ALBUMIN: 3.7 G/DL
ALKALINE PHOSPHATATE(ALK PHOS): 96 IU/L
BILIRUBIN TOTAL: 0.7 MG/DL
BUN: 25 MG/DL
CALCIUM: 9.5 MG/DL
CHLORIDE: 105 MEQ/L
CHOLESTEROL, TOTAL: 117 MG/DL
CREATININE, SERUM: 1.23 MG/DL
GLUCOSE: 108 MG/DL
HDL CHOLESTEROL: 46 MG/DL
HEMOGLOBIN A1C: 8.3 %
LDL CHOLESTEROL: 55 MG/DL
POTASSIUM, SERUM: 4.9 MEQ/L
PROTEIN, TOTAL: 7.6 G/DL
SGOT (AST): 36 IU/L
SGPT (ALT): 63 IU/L
SODIUM: 140 MEQ/L
TRIGLYCERIDES: 78 MG/DL

## 2018-08-28 ENCOUNTER — TELEPHONE (OUTPATIENT)
Dept: FAMILY MEDICINE CLINIC | Facility: CLINIC | Age: 78
End: 2018-08-28

## 2018-08-28 NOTE — TELEPHONE ENCOUNTER
Pt's daughter letting us know pt is having Issues with urinating - struggling - it is like he has to push to go to the bathroom.     Pts cardiologist told them to call Navi Edwards.    (this is the same daughter who is inquiring on another pt - Tari Bautista

## 2018-08-29 ENCOUNTER — HOSPITAL ENCOUNTER (OUTPATIENT)
Dept: CV DIAGNOSTICS | Facility: HOSPITAL | Age: 78
Discharge: HOME | End: 2018-08-29
Attending: INTERNAL MEDICINE
Payer: MEDICAID

## 2018-08-29 ENCOUNTER — PRIOR ORIGINAL RECORDS (OUTPATIENT)
Dept: OTHER | Age: 78
End: 2018-08-29

## 2018-08-29 DIAGNOSIS — I25.10 CAD (CORONARY ARTERY DISEASE): ICD-10-CM

## 2018-08-29 DIAGNOSIS — R60.1 ANASARCA: ICD-10-CM

## 2018-08-29 DIAGNOSIS — I48.20 ATRIAL FIBRILLATION, CHRONIC (HCC): ICD-10-CM

## 2018-08-29 PROCEDURE — 93306 TTE W/DOPPLER COMPLETE: CPT | Performed by: INTERNAL MEDICINE

## 2018-08-29 NOTE — TELEPHONE ENCOUNTER
Patient is c/o difficulty urinating since last week. Offered appointment today and daughter cannot take that. Scheduled for tomorrow with Dr. Jenny Deng.   I advised if her to call back if he can be seen today as there is an opening this am with Dr. Jenny Deng or

## 2018-08-30 ENCOUNTER — PRIOR ORIGINAL RECORDS (OUTPATIENT)
Dept: OTHER | Age: 78
End: 2018-08-30

## 2018-08-30 ENCOUNTER — OFFICE VISIT (OUTPATIENT)
Dept: FAMILY MEDICINE CLINIC | Facility: CLINIC | Age: 78
End: 2018-08-30
Payer: MEDICAID

## 2018-08-30 VITALS
BODY MASS INDEX: 27 KG/M2 | SYSTOLIC BLOOD PRESSURE: 138 MMHG | DIASTOLIC BLOOD PRESSURE: 78 MMHG | TEMPERATURE: 98 F | RESPIRATION RATE: 12 BRPM | WEIGHT: 173 LBS | HEART RATE: 53 BPM | OXYGEN SATURATION: 100 %

## 2018-08-30 DIAGNOSIS — R39.15 URGENCY OF URINATION: Primary | ICD-10-CM

## 2018-08-30 LAB
MULTISTIX EXPIRATION DATE: ABNORMAL DATE
MULTISTIX LOT#: ABNORMAL NUMERIC
PH, URINE: 5 (ref 4.5–8)
PROTEIN (URINE DIPSTICK): 30 MG/DL
SPECIFIC GRAVITY: >=1.03 (ref 1–1.03)
UROBILINOGEN,SEMI-QN: 0.2 MG/DL (ref 0–1.9)

## 2018-08-30 PROCEDURE — 87086 URINE CULTURE/COLONY COUNT: CPT | Performed by: FAMILY MEDICINE

## 2018-08-30 PROCEDURE — 99213 OFFICE O/P EST LOW 20 MIN: CPT | Performed by: FAMILY MEDICINE

## 2018-08-30 PROCEDURE — 81003 URINALYSIS AUTO W/O SCOPE: CPT | Performed by: FAMILY MEDICINE

## 2018-08-30 RX ORDER — AMLODIPINE BESYLATE 10 MG/1
TABLET ORAL
Refills: 2 | COMMUNITY
Start: 2018-08-13 | End: 2019-02-09

## 2018-08-31 ENCOUNTER — APPOINTMENT (OUTPATIENT)
Dept: LAB | Facility: HOSPITAL | Age: 78
End: 2018-08-31
Attending: FAMILY MEDICINE
Payer: MEDICAID

## 2018-08-31 ENCOUNTER — PRIOR ORIGINAL RECORDS (OUTPATIENT)
Dept: OTHER | Age: 78
End: 2018-08-31

## 2018-08-31 DIAGNOSIS — R79.89 ELEVATED SERUM CREATININE: ICD-10-CM

## 2018-08-31 DIAGNOSIS — R39.15 URGENCY OF URINATION: ICD-10-CM

## 2018-08-31 LAB
ANION GAP SERPL CALC-SCNC: 9 MMOL/L (ref 0–18)
BUN BLD-MCNC: 20 MG/DL (ref 8–20)
BUN/CREAT SERPL: 15.6 (ref 10–20)
CALCIUM BLD-MCNC: 8.9 MG/DL (ref 8.3–10.3)
CHLORIDE SERPL-SCNC: 105 MMOL/L (ref 101–111)
CO2 SERPL-SCNC: 23 MMOL/L (ref 22–32)
COMPLEXED PSA SERPL-MCNC: 1.75 NG/ML (ref 0.01–4)
CREAT BLD-MCNC: 1.28 MG/DL (ref 0.7–1.3)
GLUCOSE BLD-MCNC: 252 MG/DL (ref 70–99)
OSMOLALITY SERPL CALC.SUM OF ELEC: 295 MOSM/KG (ref 275–295)
POTASSIUM SERPL-SCNC: 5 MMOL/L (ref 3.6–5.1)
SODIUM SERPL-SCNC: 137 MMOL/L (ref 136–144)

## 2018-08-31 PROCEDURE — 36415 COLL VENOUS BLD VENIPUNCTURE: CPT

## 2018-08-31 PROCEDURE — 80048 BASIC METABOLIC PNL TOTAL CA: CPT

## 2018-08-31 NOTE — PROGRESS NOTES
HPI:    Patient ID: Kavon Palma is a 69year old male. HPI  Patient states that he has to exert some extra pressure to get urine out. This is only been happening recently over the last week. No other symptoms.   Review of Systems  Negative excep (NITROSTAT) 0.4 MG Sublingual SL Tab Place 0.4 mg under the tongue every 5 (five) minutes as needed for Chest pain.  Disp:  Rfl:      Allergies:No Known Allergies   PHYSICAL EXAM:   Physical Exam  Awake, alert, in no acute distress  GI:  bowel sound positiv

## 2018-11-02 DIAGNOSIS — IMO0001 UNCONTROLLED TYPE 2 DIABETES MELLITUS WITHOUT COMPLICATION, WITHOUT LONG-TERM CURRENT USE OF INSULIN: ICD-10-CM

## 2018-11-02 NOTE — TELEPHONE ENCOUNTER
PASCUAL with his Daughter in [place], will call back on Monday to schedule, after she checks her schedule.  PSR does not need encounter

## 2018-11-07 RX ORDER — GLIPIZIDE 5 MG/1
TABLET ORAL
Qty: 45 TABLET | Refills: 0 | OUTPATIENT
Start: 2018-11-07

## 2018-12-03 DIAGNOSIS — IMO0001 UNCONTROLLED TYPE 2 DIABETES MELLITUS WITHOUT COMPLICATION, WITHOUT LONG-TERM CURRENT USE OF INSULIN: ICD-10-CM

## 2018-12-03 DIAGNOSIS — I10 ESSENTIAL HYPERTENSION: ICD-10-CM

## 2018-12-03 DIAGNOSIS — I25.10 CORONARY ARTERY DISEASE DUE TO LIPID RICH PLAQUE: ICD-10-CM

## 2018-12-03 DIAGNOSIS — I25.83 CORONARY ARTERY DISEASE DUE TO LIPID RICH PLAQUE: ICD-10-CM

## 2018-12-03 RX ORDER — LANCETS
EACH MISCELLANEOUS
Qty: 100 EACH | Refills: 6 | Status: ON HOLD | OUTPATIENT
Start: 2018-12-03 | End: 2020-02-18

## 2018-12-03 RX ORDER — GLIPIZIDE 5 MG/1
TABLET ORAL
Qty: 45 TABLET | Refills: 0 | Status: SHIPPED | OUTPATIENT
Start: 2018-12-03 | End: 2019-02-09

## 2019-01-03 DIAGNOSIS — IMO0001 UNCONTROLLED TYPE 2 DIABETES MELLITUS WITHOUT COMPLICATION, WITHOUT LONG-TERM CURRENT USE OF INSULIN: ICD-10-CM

## 2019-01-04 RX ORDER — BLOOD SUGAR DIAGNOSTIC
STRIP MISCELLANEOUS
Qty: 100 STRIP | Refills: 3 | Status: SHIPPED | OUTPATIENT
Start: 2019-01-04 | End: 2019-01-24

## 2019-01-24 ENCOUNTER — TELEPHONE (OUTPATIENT)
Dept: FAMILY MEDICINE CLINIC | Facility: CLINIC | Age: 79
End: 2019-01-24

## 2019-01-24 DIAGNOSIS — IMO0001 UNCONTROLLED TYPE 2 DIABETES MELLITUS WITHOUT COMPLICATION, WITHOUT LONG-TERM CURRENT USE OF INSULIN: ICD-10-CM

## 2019-01-24 NOTE — TELEPHONE ENCOUNTER
Dr. Jim Rodriguez is calling to request One touch meter. Test strips and lancets order was received so they only need the meter.       [address], [place] - [address] [place] [address] [id number], [phone]    Spoke to [facility]

## 2019-01-28 ENCOUNTER — TELEPHONE (OUTPATIENT)
Dept: FAMILY MEDICINE CLINIC | Facility: CLINIC | Age: 79
End: 2019-01-28

## 2019-01-28 RX ORDER — BLOOD-GLUCOSE METER
1 EACH MISCELLANEOUS 2 TIMES DAILY
Qty: 1 KIT | Refills: 0 | Status: SHIPPED | OUTPATIENT
Start: 2019-01-28 | End: 2020-01-28

## 2019-01-28 NOTE — TELEPHONE ENCOUNTER
Lizbet Lamas is calling because they received the script already for the strips and lancets but not the meter. She needs the meter still.

## 2019-02-04 DIAGNOSIS — IMO0001 UNCONTROLLED TYPE 2 DIABETES MELLITUS WITHOUT COMPLICATION, WITHOUT LONG-TERM CURRENT USE OF INSULIN: ICD-10-CM

## 2019-02-09 ENCOUNTER — OFFICE VISIT (OUTPATIENT)
Dept: ENDOCRINOLOGY CLINIC | Facility: CLINIC | Age: 79
End: 2019-02-09
Payer: MEDICAID

## 2019-02-09 VITALS
HEART RATE: 50 BPM | HEIGHT: 67 IN | BODY MASS INDEX: 27.94 KG/M2 | TEMPERATURE: 98 F | DIASTOLIC BLOOD PRESSURE: 70 MMHG | RESPIRATION RATE: 20 BRPM | SYSTOLIC BLOOD PRESSURE: 134 MMHG | WEIGHT: 178 LBS

## 2019-02-09 DIAGNOSIS — E11.65 TYPE 2 DIABETES MELLITUS WITH HYPERGLYCEMIA, WITHOUT LONG-TERM CURRENT USE OF INSULIN (HCC): Primary | ICD-10-CM

## 2019-02-09 DIAGNOSIS — IMO0001 UNCONTROLLED TYPE 2 DIABETES MELLITUS WITHOUT COMPLICATION, WITHOUT LONG-TERM CURRENT USE OF INSULIN: ICD-10-CM

## 2019-02-09 PROBLEM — R07.9 ACUTE CHEST PAIN: Status: RESOLVED | Noted: 2017-08-27 | Resolved: 2019-02-09

## 2019-02-09 LAB
CARTRIDGE LOT#: 974 NUMERIC
GLUCOSE BLOOD: 138
HEMOGLOBIN A1C: 7.2 % (ref 4.3–5.6)
TEST STRIP LOT #: NORMAL NUMERIC

## 2019-02-09 PROCEDURE — 99214 OFFICE O/P EST MOD 30 MIN: CPT | Performed by: NURSE PRACTITIONER

## 2019-02-09 PROCEDURE — 83036 HEMOGLOBIN GLYCOSYLATED A1C: CPT | Performed by: NURSE PRACTITIONER

## 2019-02-09 PROCEDURE — 82962 GLUCOSE BLOOD TEST: CPT | Performed by: NURSE PRACTITIONER

## 2019-02-09 RX ORDER — GLIPIZIDE 5 MG/1
TABLET ORAL
Qty: 45 TABLET | Refills: 3 | Status: SHIPPED | OUTPATIENT
Start: 2019-02-09 | End: 2019-10-29 | Stop reason: ALTCHOICE

## 2019-02-09 NOTE — PATIENT INSTRUCTIONS
Your A1C: 7.3%   The main goal of diabetes treatment is to keep your sugar from going too high.  We measure your overall blood sugar trends with a Hemoglobin A1C test. (also called an A1C)  For most people the target is less than 7.0% but sometimes we make

## 2019-02-09 NOTE — PROGRESS NOTES
Patient presents with:  Diabetes: room-17 cf. pt forgot meter. HPI:   Stefania Elizabeth is a 69year old male presenting for type 2 DM medication management.  In the past 3m, DM control has remained well controlled: A1C 7.2%  (last visit A1C 8/23/2018 was 7.3%) issues  Exercise: no  Recent steroids, illness or infections: no    Allergies: Patient has no known allergies.     Past Medical History:   Diagnosis Date   • Arrhythmia     AFIB   • ASHD (arteriosclerotic heart disease)    • Atherosclerosis of coronary radha 1000 MG Oral Tab TAKE ONE TABLET BY MOUTH TWICE DAILY WITH MEALS Disp: 180 tablet Rfl: 1   MECLIZINE HCL 25 MG Oral Tab TAKE ONE TABLET BY MOUTH THREE TIMES DAILY AS NEEDED Disp: 20 tablet Rfl: 0   Metoprolol Succinate ER 25 MG Oral Tablet 24 Hr  Disp:  Rf Eyes: Conjunctivae are normal.   Cardiovascular: Normal rate and regular rhythm. Edema not present. Pulmonary/Chest: Effort normal and breath sounds normal.   Musculoskeletal:   Diabetes foot exam:   Good foot hygiene.    Bilateral barefoot skin diab is asked to return in 3m  but recommended to contact DM clinic sooner if questions or concerns. The patient indicates understanding of these issues and agrees to the plan.         Orders Placed This Encounter      Accucheck      Hgb A1C      Comp Metab

## 2019-02-09 NOTE — ASSESSMENT & PLAN NOTE
A1C :   Lab Results   Component Value Date     (H) 08/23/2018    A1C 7.2 (A) 02/09/2019     Weight: 178 lb   Diabetes control is stable   At this time it may be important for us to avoid hypoglycemia and slightly higher blood glucose levels should b

## 2019-02-28 VITALS
HEIGHT: 69 IN | SYSTOLIC BLOOD PRESSURE: 128 MMHG | WEIGHT: 175 LBS | DIASTOLIC BLOOD PRESSURE: 68 MMHG | BODY MASS INDEX: 25.92 KG/M2

## 2019-02-28 VITALS
HEART RATE: 53 BPM | BODY MASS INDEX: 25.62 KG/M2 | WEIGHT: 173 LBS | SYSTOLIC BLOOD PRESSURE: 174 MMHG | HEIGHT: 69 IN | DIASTOLIC BLOOD PRESSURE: 66 MMHG

## 2019-02-28 VITALS — SYSTOLIC BLOOD PRESSURE: 132 MMHG | DIASTOLIC BLOOD PRESSURE: 74 MMHG | HEART RATE: 60 BPM | WEIGHT: 176 LBS

## 2019-02-28 VITALS
WEIGHT: 176 LBS | SYSTOLIC BLOOD PRESSURE: 142 MMHG | HEART RATE: 53 BPM | DIASTOLIC BLOOD PRESSURE: 66 MMHG | BODY MASS INDEX: 28.28 KG/M2 | HEIGHT: 66 IN

## 2019-03-01 ENCOUNTER — MED REC SCAN ONLY (OUTPATIENT)
Dept: FAMILY MEDICINE CLINIC | Facility: CLINIC | Age: 79
End: 2019-03-01

## 2019-03-01 DIAGNOSIS — IMO0001 UNCONTROLLED TYPE 2 DIABETES MELLITUS WITHOUT COMPLICATION, WITHOUT LONG-TERM CURRENT USE OF INSULIN: ICD-10-CM

## 2019-03-02 RX ORDER — GLIPIZIDE 5 MG/1
TABLET ORAL
Qty: 45 TABLET | Refills: 0 | OUTPATIENT
Start: 2019-03-02

## 2019-03-05 ENCOUNTER — TELEPHONE (OUTPATIENT)
Dept: CARDIOLOGY | Age: 79
End: 2019-03-05

## 2019-03-16 RX ORDER — MECLIZINE HYDROCHLORIDE 25 MG/1
TABLET ORAL
Qty: 20 TABLET | Refills: 0 | OUTPATIENT
Start: 2019-03-16

## 2019-03-18 ENCOUNTER — TELEPHONE (OUTPATIENT)
Dept: CARDIOLOGY | Age: 79
End: 2019-03-18

## 2019-03-18 RX ORDER — LOSARTAN POTASSIUM 25 MG/1
25 TABLET ORAL 2 TIMES DAILY
Qty: 180 TABLET | Refills: 3 | Status: SHIPPED | OUTPATIENT
Start: 2019-03-18 | End: 2019-03-25 | Stop reason: DRUGHIGH

## 2019-03-25 ENCOUNTER — TELEPHONE (OUTPATIENT)
Dept: CARDIOLOGY | Age: 79
End: 2019-03-25

## 2019-03-25 ENCOUNTER — OFFICE VISIT (OUTPATIENT)
Dept: CARDIOLOGY | Age: 79
End: 2019-03-25

## 2019-03-25 VITALS
HEIGHT: 65 IN | DIASTOLIC BLOOD PRESSURE: 70 MMHG | SYSTOLIC BLOOD PRESSURE: 132 MMHG | HEART RATE: 56 BPM | WEIGHT: 182 LBS | BODY MASS INDEX: 30.32 KG/M2

## 2019-03-25 DIAGNOSIS — Z95.5 S/P PRIMARY ANGIOPLASTY WITH CORONARY STENT: ICD-10-CM

## 2019-03-25 DIAGNOSIS — I25.10 CAD IN NATIVE ARTERY: Primary | ICD-10-CM

## 2019-03-25 DIAGNOSIS — E78.00 PURE HYPERCHOLESTEROLEMIA: ICD-10-CM

## 2019-03-25 DIAGNOSIS — I10 BENIGN ESSENTIAL HTN: ICD-10-CM

## 2019-03-25 DIAGNOSIS — I65.23 ASYMPTOMATIC CAROTID ARTERY STENOSIS, BILATERAL: ICD-10-CM

## 2019-03-25 DIAGNOSIS — R60.1 GENERALIZED EDEMA: ICD-10-CM

## 2019-03-25 PROBLEM — R60.9 EDEMA: Status: ACTIVE | Noted: 2017-12-07

## 2019-03-25 PROCEDURE — 3078F DIAST BP <80 MM HG: CPT | Performed by: INTERNAL MEDICINE

## 2019-03-25 PROCEDURE — 99214 OFFICE O/P EST MOD 30 MIN: CPT | Performed by: INTERNAL MEDICINE

## 2019-03-25 PROCEDURE — 3075F SYST BP GE 130 - 139MM HG: CPT | Performed by: INTERNAL MEDICINE

## 2019-03-25 RX ORDER — FUROSEMIDE 40 MG/1
40 TABLET ORAL DAILY
Qty: 90 TABLET | Refills: 3 | Status: SHIPPED | OUTPATIENT
Start: 2019-03-25 | End: 2020-01-13 | Stop reason: SDUPTHER

## 2019-03-25 RX ORDER — FUROSEMIDE 20 MG/1
20 TABLET ORAL
COMMUNITY
Start: 2018-02-08 | End: 2019-03-25 | Stop reason: DRUGHIGH

## 2019-03-25 RX ORDER — BLOOD-GLUCOSE METER
1 EACH MISCELLANEOUS
COMMUNITY
Start: 2019-01-28 | End: 2020-01-28

## 2019-03-25 RX ORDER — POTASSIUM CHLORIDE 750 MG/1
10 TABLET, EXTENDED RELEASE ORAL
COMMUNITY
Start: 2018-02-05 | End: 2019-09-08 | Stop reason: SDUPTHER

## 2019-03-25 RX ORDER — LISINOPRIL 40 MG/1
40 TABLET ORAL DAILY
COMMUNITY
End: 2019-03-25 | Stop reason: ALTCHOICE

## 2019-03-25 RX ORDER — NITROGLYCERIN 0.4 MG/1
0.4 TABLET SUBLINGUAL
COMMUNITY
Start: 2018-02-08 | End: 2020-03-03 | Stop reason: SDUPTHER

## 2019-03-25 RX ORDER — GLIPIZIDE 5 MG/1
5 TABLET ORAL
COMMUNITY
Start: 2018-08-23

## 2019-03-25 RX ORDER — METOPROLOL SUCCINATE 25 MG/1
25 TABLET, EXTENDED RELEASE ORAL
COMMUNITY
Start: 2017-11-01 | End: 2019-10-10 | Stop reason: SDUPTHER

## 2019-03-25 RX ORDER — LOSARTAN POTASSIUM 50 MG/1
50 TABLET ORAL
COMMUNITY
Start: 2018-02-09 | End: 2019-03-25 | Stop reason: DRUGHIGH

## 2019-03-25 RX ORDER — AMIODARONE HYDROCHLORIDE 200 MG/1
200 TABLET ORAL
COMMUNITY
Start: 2017-09-09 | End: 2019-06-03 | Stop reason: SDUPTHER

## 2019-03-25 RX ORDER — ATORVASTATIN CALCIUM 40 MG/1
40 TABLET, FILM COATED ORAL
COMMUNITY
Start: 2015-11-02 | End: 2019-11-09 | Stop reason: SDUPTHER

## 2019-03-25 RX ORDER — AMLODIPINE BESYLATE 5 MG/1
5 TABLET ORAL
COMMUNITY
Start: 2018-02-05 | End: 2019-07-04 | Stop reason: SDUPTHER

## 2019-03-25 RX ORDER — MECLIZINE HYDROCHLORIDE 25 MG/1
TABLET ORAL
COMMUNITY
Start: 2018-02-01 | End: 2021-01-04

## 2019-03-25 RX ORDER — CLOPIDOGREL BISULFATE 75 MG/1
75 TABLET ORAL
COMMUNITY
Start: 2018-12-04 | End: 2019-09-08 | Stop reason: SDUPTHER

## 2019-03-25 RX ORDER — LOSARTAN POTASSIUM 100 MG/1
100 TABLET ORAL DAILY
Qty: 90 TABLET | Refills: 3 | Status: SHIPPED | OUTPATIENT
Start: 2019-03-25 | End: 2019-03-25 | Stop reason: SDUPTHER

## 2019-03-25 RX ORDER — LANCETS
EACH MISCELLANEOUS
COMMUNITY
Start: 2018-12-03 | End: 2020-07-09 | Stop reason: ALTCHOICE

## 2019-03-25 RX ORDER — LOSARTAN POTASSIUM 100 MG/1
100 TABLET ORAL DAILY
Qty: 90 TABLET | Refills: 3 | Status: SHIPPED | OUTPATIENT
Start: 2019-03-25 | End: 2020-04-10

## 2019-03-25 SDOH — HEALTH STABILITY: MENTAL HEALTH: HOW OFTEN DO YOU HAVE A DRINK CONTAINING ALCOHOL?: NEVER

## 2019-04-01 ENCOUNTER — TELEPHONE (OUTPATIENT)
Dept: CARDIOLOGY | Age: 79
End: 2019-04-01

## 2019-04-03 ENCOUNTER — TELEPHONE (OUTPATIENT)
Dept: CARDIOLOGY | Age: 79
End: 2019-04-03

## 2019-04-03 DIAGNOSIS — I48.20 ATRIAL FIBRILLATION, CHRONIC (CMD): Primary | ICD-10-CM

## 2019-04-03 DIAGNOSIS — Z92.29 HX OF AMIODARONE THERAPY: Primary | ICD-10-CM

## 2019-04-03 DIAGNOSIS — Z92.29 HISTORY OF AMIODARONE THERAPY: Primary | ICD-10-CM

## 2019-04-03 RX ORDER — AMIODARONE HYDROCHLORIDE 200 MG/1
TABLET ORAL
Qty: 30 TABLET | Refills: 9 | OUTPATIENT
Start: 2019-04-03

## 2019-04-05 ENCOUNTER — HOSPITAL ENCOUNTER (OUTPATIENT)
Dept: ULTRASOUND IMAGING | Facility: HOSPITAL | Age: 79
Discharge: HOME | End: 2019-04-05
Attending: INTERNAL MEDICINE
Payer: MEDICAID

## 2019-04-05 DIAGNOSIS — I25.10 CAD (CORONARY ARTERY DISEASE): ICD-10-CM

## 2019-04-05 PROCEDURE — 93880 EXTRACRANIAL BILAT STUDY: CPT | Performed by: INTERNAL MEDICINE

## 2019-04-09 ENCOUNTER — TELEPHONE (OUTPATIENT)
Dept: CARDIOLOGY | Age: 79
End: 2019-04-09

## 2019-04-11 ENCOUNTER — APPOINTMENT (OUTPATIENT)
Dept: CARDIOLOGY | Age: 79
End: 2019-04-11

## 2019-04-12 ENCOUNTER — TELEPHONE (OUTPATIENT)
Dept: CARDIOLOGY | Age: 79
End: 2019-04-12

## 2019-05-22 ENCOUNTER — HOSPITAL ENCOUNTER (OUTPATIENT)
Dept: GENERAL RADIOLOGY | Facility: HOSPITAL | Age: 79
Discharge: HOME | End: 2019-05-22
Attending: NURSE PRACTITIONER
Payer: MEDICAID

## 2019-05-22 ENCOUNTER — TELEPHONE (OUTPATIENT)
Dept: CARDIOLOGY | Age: 79
End: 2019-05-22

## 2019-05-22 ENCOUNTER — LAB ENCOUNTER (OUTPATIENT)
Dept: LAB | Facility: HOSPITAL | Age: 79
End: 2019-05-22
Attending: INTERNAL MEDICINE
Payer: MEDICAID

## 2019-05-22 ENCOUNTER — HOSPITAL ENCOUNTER (OUTPATIENT)
Dept: CV DIAGNOSTICS | Facility: HOSPITAL | Age: 79
Discharge: HOME | End: 2019-05-22
Attending: INTERNAL MEDICINE
Payer: MEDICAID

## 2019-05-22 DIAGNOSIS — E78.00 PURE HYPERCHOLESTEROLEMIA: Primary | ICD-10-CM

## 2019-05-22 DIAGNOSIS — Z92.29 HISTORY OF AMIODARONE THERAPY: ICD-10-CM

## 2019-05-22 DIAGNOSIS — I25.10 CAD (CORONARY ARTERY DISEASE): ICD-10-CM

## 2019-05-22 DIAGNOSIS — I48.20 CHRONIC ATRIAL FIBRILLATION (HCC): ICD-10-CM

## 2019-05-22 LAB
ALBUMIN SERPL-MCNC: 3.6 G/DL
ALBUMIN/GLOB SERPL: NORMAL {RATIO}
ALP SERPL-CCNC: 89 U/L
ALT SERPL-CCNC: 56 U/L
ANION GAP SERPL CALC-SCNC: NORMAL MMOL/L
AST SERPL-CCNC: 65 U/L
BILIRUB SERPL-MCNC: 0.8 MG/DL
BUN SERPL-MCNC: 19 MG/DL
BUN/CREAT SERPL: NORMAL
CALCIUM SERPL-MCNC: 9.4 MG/DL
CHLORIDE SERPL-SCNC: 108 MMOL/L
CHOLEST SERPL-MCNC: 144 MG/DL
CHOLEST/HDLC SERPL: NORMAL {RATIO}
CO2 SERPL-SCNC: NORMAL MMOL/L
CREAT SERPL-MCNC: 1.34 MG/DL
GLOBULIN SER-MCNC: 3.7 G/DL
GLUCOSE SERPL-MCNC: 149 MG/DL
HDLC SERPL-MCNC: 42 MG/DL
LDLC SERPL CALC-MCNC: 81 MG/DL
LENGTH OF FAST TIME PATIENT: NORMAL H
LENGTH OF FAST TIME PATIENT: NORMAL H
NONHDLC SERPL-MCNC: NORMAL MG/DL
POTASSIUM SERPL-SCNC: 4.8 MMOL/L
PROT SERPL-MCNC: 7.3 G/DL
SODIUM SERPL-SCNC: 140 MMOL/L
TRIGL SERPL-MCNC: 104 MG/DL
VLDLC SERPL CALC-MCNC: NORMAL MG/DL

## 2019-05-22 PROCEDURE — 71046 X-RAY EXAM CHEST 2 VIEWS: CPT | Performed by: NURSE PRACTITIONER

## 2019-05-22 PROCEDURE — 36415 COLL VENOUS BLD VENIPUNCTURE: CPT

## 2019-05-22 PROCEDURE — 78452 HT MUSCLE IMAGE SPECT MULT: CPT | Performed by: INTERNAL MEDICINE

## 2019-05-22 PROCEDURE — 84439 ASSAY OF FREE THYROXINE: CPT

## 2019-05-22 PROCEDURE — 80061 LIPID PANEL: CPT

## 2019-05-22 PROCEDURE — 93018 CV STRESS TEST I&R ONLY: CPT | Performed by: INTERNAL MEDICINE

## 2019-05-22 PROCEDURE — 84443 ASSAY THYROID STIM HORMONE: CPT

## 2019-05-22 PROCEDURE — 80053 COMPREHEN METABOLIC PANEL: CPT

## 2019-05-22 PROCEDURE — 93017 CV STRESS TEST TRACING ONLY: CPT | Performed by: INTERNAL MEDICINE

## 2019-05-22 RX ORDER — HYDRALAZINE HYDROCHLORIDE 10 MG/1
10 TABLET, FILM COATED ORAL 3 TIMES DAILY
Qty: 90 TABLET | Refills: 2 | Status: SHIPPED | OUTPATIENT
Start: 2019-05-22 | End: 2019-11-09 | Stop reason: SDUPTHER

## 2019-05-22 NOTE — PROGRESS NOTES
Pt in cardiographics dept for Lexiscan stress test. Resting BP 200s/70s. BP remained elevated throughout test and recovery ranging from 180s-200s/70s. Pt asymptomatic, state he took his \"heart medications\" this morning.  States he is taking the increased

## 2019-05-23 ENCOUNTER — TELEPHONE (OUTPATIENT)
Dept: CARDIOLOGY | Age: 79
End: 2019-05-23

## 2019-05-24 ENCOUNTER — TELEPHONE (OUTPATIENT)
Dept: CARDIOLOGY | Age: 79
End: 2019-05-24

## 2019-05-28 ENCOUNTER — CLINICAL ABSTRACT (OUTPATIENT)
Dept: CARDIOLOGY | Age: 79
End: 2019-05-28

## 2019-05-31 ENCOUNTER — TELEPHONE (OUTPATIENT)
Dept: CARDIOLOGY | Age: 79
End: 2019-05-31

## 2019-05-31 DIAGNOSIS — I65.23 ASYMPTOMATIC CAROTID ARTERY STENOSIS, BILATERAL: ICD-10-CM

## 2019-05-31 DIAGNOSIS — E78.00 PURE HYPERCHOLESTEROLEMIA: ICD-10-CM

## 2019-05-31 DIAGNOSIS — I25.10 CAD IN NATIVE ARTERY: ICD-10-CM

## 2019-05-31 DIAGNOSIS — Z92.29 HISTORY OF AMIODARONE THERAPY: ICD-10-CM

## 2019-06-06 RX ORDER — AMIODARONE HYDROCHLORIDE 200 MG/1
TABLET ORAL
Qty: 90 TABLET | Refills: 2 | Status: SHIPPED | OUTPATIENT
Start: 2019-06-06 | End: 2020-04-10

## 2019-07-05 RX ORDER — AMLODIPINE BESYLATE 5 MG/1
TABLET ORAL
Qty: 90 TABLET | Refills: 0 | Status: SHIPPED | OUTPATIENT
Start: 2019-07-05 | End: 2019-11-09 | Stop reason: SDUPTHER

## 2019-07-12 ENCOUNTER — TELEPHONE (OUTPATIENT)
Dept: CARDIOLOGY | Age: 79
End: 2019-07-12

## 2019-07-18 ENCOUNTER — APPOINTMENT (OUTPATIENT)
Dept: CARDIOLOGY | Age: 79
End: 2019-07-18

## 2019-08-27 ENCOUNTER — TELEPHONE (OUTPATIENT)
Dept: FAMILY MEDICINE CLINIC | Facility: CLINIC | Age: 79
End: 2019-08-27

## 2019-08-27 DIAGNOSIS — H91.90 HEARING LOSS, UNSPECIFIED HEARING LOSS TYPE, UNSPECIFIED LATERALITY: Primary | ICD-10-CM

## 2019-09-09 RX ORDER — POTASSIUM CHLORIDE 750 MG/1
TABLET, EXTENDED RELEASE ORAL
Qty: 90 TABLET | Refills: 0 | Status: SHIPPED | OUTPATIENT
Start: 2019-09-09 | End: 2020-07-09 | Stop reason: ALTCHOICE

## 2019-09-09 RX ORDER — CLOPIDOGREL BISULFATE 75 MG/1
TABLET ORAL
Qty: 90 TABLET | Refills: 0 | Status: SHIPPED | OUTPATIENT
Start: 2019-09-09 | End: 2019-12-26 | Stop reason: SDUPTHER

## 2019-10-14 RX ORDER — METOPROLOL SUCCINATE 25 MG/1
TABLET, EXTENDED RELEASE ORAL
Qty: 90 TABLET | Refills: 1 | Status: SHIPPED | OUTPATIENT
Start: 2019-10-14 | End: 2019-10-25 | Stop reason: ALTCHOICE

## 2019-10-18 ENCOUNTER — TELEPHONE (OUTPATIENT)
Dept: CARDIOLOGY | Age: 79
End: 2019-10-18

## 2019-10-18 DIAGNOSIS — R42 DIZZINESS: Primary | ICD-10-CM

## 2019-10-18 DIAGNOSIS — I48.20 ATRIAL FIBRILLATION, CHRONIC (CMD): ICD-10-CM

## 2019-10-18 DIAGNOSIS — I10 BENIGN ESSENTIAL HTN: ICD-10-CM

## 2019-10-22 ENCOUNTER — APPOINTMENT (OUTPATIENT)
Dept: LAB | Age: 79
End: 2019-10-22
Attending: NURSE PRACTITIONER
Payer: MEDICAID

## 2019-10-22 ENCOUNTER — OFFICE VISIT (OUTPATIENT)
Dept: ENDOCRINOLOGY CLINIC | Facility: CLINIC | Age: 79
End: 2019-10-22
Payer: MEDICAID

## 2019-10-22 VITALS
WEIGHT: 177 LBS | DIASTOLIC BLOOD PRESSURE: 60 MMHG | SYSTOLIC BLOOD PRESSURE: 136 MMHG | BODY MASS INDEX: 28 KG/M2 | HEART RATE: 52 BPM

## 2019-10-22 DIAGNOSIS — E11.65 TYPE 2 DIABETES MELLITUS WITH HYPERGLYCEMIA, WITHOUT LONG-TERM CURRENT USE OF INSULIN (HCC): Primary | ICD-10-CM

## 2019-10-22 DIAGNOSIS — E11.65 TYPE 2 DIABETES MELLITUS WITH HYPERGLYCEMIA, WITHOUT LONG-TERM CURRENT USE OF INSULIN (HCC): ICD-10-CM

## 2019-10-22 PROCEDURE — 82570 ASSAY OF URINE CREATININE: CPT

## 2019-10-22 PROCEDURE — 83036 HEMOGLOBIN GLYCOSYLATED A1C: CPT | Performed by: NURSE PRACTITIONER

## 2019-10-22 PROCEDURE — 80048 BASIC METABOLIC PNL TOTAL CA: CPT

## 2019-10-22 PROCEDURE — 82043 UR ALBUMIN QUANTITATIVE: CPT

## 2019-10-22 PROCEDURE — 99214 OFFICE O/P EST MOD 30 MIN: CPT | Performed by: NURSE PRACTITIONER

## 2019-10-22 PROCEDURE — 95250 CONT GLUC MNTR PHYS/QHP EQP: CPT | Performed by: NURSE PRACTITIONER

## 2019-10-22 RX ORDER — HYDRALAZINE HYDROCHLORIDE 10 MG/1
10 TABLET, FILM COATED ORAL 3 TIMES DAILY
Status: ON HOLD | COMMUNITY
End: 2020-02-18

## 2019-10-22 NOTE — PROGRESS NOTES
A continuous glucose sensor for 24 hour blood sugar monitoring was placed on patient today per [facility] order.    Serial NUMBER: [id number]  Exp date: (3/31/20)  - After cleansing skin with alcohol prep, Sensor (LILLY)was inserted on  LEFT Posterior upper arm a

## 2019-10-22 NOTE — PROGRESS NOTES
Patient presents with:  Diabetes: room-17 cf. pt has readings. HPI:   Preston Trinidad is a 67year old male presenting for type 2 DM medication management.  In the past 3m, DM control has remained well controlled A1C 7.1%  (last A1C 7.2%)  However he has been e stent  Stroke/CVA yes     Modifying factors:  Medication adherence: yes  Nutrition  No issues  Exercise: no  Recent steroids, illness or infections: no    Allergies: Patient has no known allergies.      Current Outpatient Medications   Medication Sig Dispen Atherosclerosis of coronary artery    • Carotid artery stenosis    • Cataract    • Coronary atherosclerosis    • Heart attack ([facility] [address]    • High blood pressure    • High cholesterol    • Other and unspecified hyperlipidemia    • Stroke [facility])    • Type II or u Skin is warm and dry. No bleeding or erythema  noted at sensor site. Psychiatric: He has a normal mood and affect.  His behavior is normal.       Assessment/Plan:  Type 2 diabetes mellitus with microalbuminuria, without long-term current use of insulin ( ACEi  Depression Screen:  PHQ-2 NEG 2/2019   Feet exam: Reviewed feet precautions 2-9-2019 in office exam   BP: as above  Lipids: taking statin - 5-2019 labs   Dentist: recommended every 6 months      The risks and benefits of my recommendations, as well a

## 2019-10-23 DIAGNOSIS — R80.9 TYPE 2 DIABETES MELLITUS WITH MICROALBUMINURIA, WITHOUT LONG-TERM CURRENT USE OF INSULIN (HCC): Primary | ICD-10-CM

## 2019-10-23 DIAGNOSIS — E11.29 TYPE 2 DIABETES MELLITUS WITH MICROALBUMINURIA, WITHOUT LONG-TERM CURRENT USE OF INSULIN (HCC): Primary | ICD-10-CM

## 2019-10-23 DIAGNOSIS — IMO0001 UNCONTROLLED TYPE 2 DIABETES MELLITUS WITHOUT COMPLICATION, WITHOUT LONG-TERM CURRENT USE OF INSULIN: ICD-10-CM

## 2019-10-23 NOTE — PROGRESS NOTES
Pt BMP resulted. eGFR 43   Per FDA: METFORMIN SAFETY   eGFR =30-44: max dose 1000 mg/day    Follow renal function every 3 months.      Will decrease Metformin to 500mg twice daily

## 2019-10-28 ENCOUNTER — TELEPHONE (OUTPATIENT)
Dept: ENDOCRINOLOGY CLINIC | Facility: CLINIC | Age: 79
End: 2019-10-28

## 2019-10-28 NOTE — TELEPHONE ENCOUNTER
Spoke to Daughter-in-law, and gave her instructions to Per Yessica Gomez to either cut the Metformin in half and do 500 mg BID. She states that she understands how to cut the tabs, but would still like the new Rx of Metformin 500 mg.   Please order that for

## 2019-10-28 NOTE — TELEPHONE ENCOUNTER
My chart message sent but NOT read   Please call re: kidney labs       eGFR 43 on labs; he will need to decrease Metformin dose to 500mg twice daily   He has option to cut tablets in half and take 1/2 tab twice daiy or new rx can be sent in   Follow BMP ev

## 2019-10-29 ENCOUNTER — TELEPHONE (OUTPATIENT)
Dept: ENDOCRINOLOGY CLINIC | Facility: CLINIC | Age: 79
End: 2019-10-29

## 2019-10-29 ENCOUNTER — DIABETIC EDUCATION (OUTPATIENT)
Dept: ENDOCRINOLOGY CLINIC | Facility: CLINIC | Age: 79
End: 2019-10-29
Payer: MEDICAID

## 2019-10-29 DIAGNOSIS — E11.65 TYPE 2 DIABETES MELLITUS WITH HYPERGLYCEMIA, WITHOUT LONG-TERM CURRENT USE OF INSULIN (HCC): Primary | ICD-10-CM

## 2019-10-29 PROCEDURE — 95251 CONT GLUC MNTR ANALYSIS I&R: CPT | Performed by: NURSE PRACTITIONER

## 2019-10-29 PROCEDURE — 99211 OFF/OP EST MAY X REQ PHY/QHP: CPT | Performed by: DIETITIAN, REGISTERED

## 2019-10-29 RX ORDER — NATEGLINIDE 60 MG/1
60 TABLET, COATED ORAL
Qty: 90 TABLET | Refills: 0 | Status: SHIPPED | OUTPATIENT
Start: 2019-10-29 | End: 2019-11-13 | Stop reason: DRUGHIGH

## 2019-10-29 NOTE — PROGRESS NOTES
Troy Prader  6/20/1940 was seen for Continuous Glucose Sensor Follow-up Visit:    10/29/2019   Referring Provider: Marcey Leventhal   Start time: 10:30 End time: 11:30    Sensor Type: Professional Silvina    Pt here with wife and daughter.     Reviewed CGMS provided.   Thank you for the referral.  Pt to follow-up with [facility] in 6 weeks: Tues 12/10 @ 10:30  Ivone Goodwin MS, RD, CDE, LDN

## 2019-10-29 NOTE — TELEPHONE ENCOUNTER
Family prefers TID dosing to help reduce risk of hypoglycemia. Breakfast 8-9am: bread and leaves (amish), hard-boiled egg with mumra (rice puffs). Tea without sugar.  And Cokra (similar to a chip)  Lunch 12:30-1:30 -- whole wheat roti (one), vegetables

## 2019-10-29 NOTE — TELEPHONE ENCOUNTER
Reviewed luis CGM w [facility]  Since he is having hypoglycemia 9% time - will stop glipizide 2.5mg once daily and start starlix 60mg TID w meals

## 2019-11-08 ENCOUNTER — TELEPHONE (OUTPATIENT)
Dept: FAMILY MEDICINE CLINIC | Facility: CLINIC | Age: 79
End: 2019-11-08

## 2019-11-08 ENCOUNTER — OFFICE VISIT (OUTPATIENT)
Dept: FAMILY MEDICINE CLINIC | Facility: CLINIC | Age: 79
End: 2019-11-08
Payer: MEDICAID

## 2019-11-08 VITALS
HEIGHT: 67 IN | BODY MASS INDEX: 27.62 KG/M2 | OXYGEN SATURATION: 99 % | SYSTOLIC BLOOD PRESSURE: 158 MMHG | TEMPERATURE: 98 F | HEART RATE: 80 BPM | DIASTOLIC BLOOD PRESSURE: 70 MMHG | RESPIRATION RATE: 16 BRPM | WEIGHT: 176 LBS

## 2019-11-08 DIAGNOSIS — R03.0 ELEVATED BLOOD PRESSURE READING: ICD-10-CM

## 2019-11-08 DIAGNOSIS — T30.0 SKIN BURN: Primary | ICD-10-CM

## 2019-11-08 PROCEDURE — 99214 OFFICE O/P EST MOD 30 MIN: CPT | Performed by: FAMILY MEDICINE
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
## 2019-11-12 ENCOUNTER — TELEPHONE (OUTPATIENT)
Dept: INTERNAL MEDICINE CLINIC | Facility: CLINIC | Age: 79
End: 2019-11-12

## 2019-11-12 RX ORDER — HYDRALAZINE HYDROCHLORIDE 10 MG/1
TABLET, FILM COATED ORAL
Qty: 90 TABLET | Refills: 0 | Status: SHIPPED | OUTPATIENT
Start: 2019-11-12 | End: 2020-01-20 | Stop reason: SDUPTHER

## 2019-11-12 RX ORDER — AMLODIPINE BESYLATE 5 MG/1
TABLET ORAL
Qty: 90 TABLET | Refills: 0 | Status: SHIPPED | OUTPATIENT
Start: 2019-11-12 | End: 2020-02-11 | Stop reason: SDUPTHER

## 2019-11-12 RX ORDER — ATORVASTATIN CALCIUM 40 MG/1
TABLET, FILM COATED ORAL
Qty: 90 TABLET | Refills: 0 | Status: SHIPPED | OUTPATIENT
Start: 2019-11-12 | End: 2020-02-11 | Stop reason: SDUPTHER

## 2019-11-13 ENCOUNTER — TELEPHONE (OUTPATIENT)
Dept: FAMILY MEDICINE CLINIC | Facility: CLINIC | Age: 79
End: 2019-11-13

## 2019-11-13 RX ORDER — NATEGLINIDE 120 MG/1
120 TABLET, COATED ORAL
Qty: 90 TABLET | Refills: 0 | OUTPATIENT
Start: 2019-11-13 | End: 2019-12-18 | Stop reason: SDUPTHER
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
## 2019-11-13 NOTE — TELEPHONE ENCOUNTER
Pt's daughter would like to speak to a nurse in regards to her dad has a question on diabetes, please call
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
## 2019-11-13 NOTE — TELEPHONE ENCOUNTER
I would recommend monitoring blood sugars, as burn heals, blood sugars should improve over the next 1-2 wks, if not, I would recommend continuing with recommendation of diabetes center.

## 2019-11-15 ENCOUNTER — OFFICE VISIT (OUTPATIENT)
Dept: FAMILY MEDICINE CLINIC | Facility: CLINIC | Age: 79
End: 2019-11-15
Payer: MEDICAID

## 2019-11-15 VITALS
BODY MASS INDEX: 27.06 KG/M2 | RESPIRATION RATE: 16 BRPM | DIASTOLIC BLOOD PRESSURE: 70 MMHG | SYSTOLIC BLOOD PRESSURE: 160 MMHG | WEIGHT: 172.38 LBS | HEART RATE: 74 BPM | HEIGHT: 67 IN | TEMPERATURE: 98 F

## 2019-11-15 DIAGNOSIS — T22.251A PARTIAL THICKNESS BURN OF RIGHT SHOULDER, INITIAL ENCOUNTER: Primary | ICD-10-CM

## 2019-11-15 DIAGNOSIS — Z00.00 ROUTINE GENERAL MEDICAL EXAMINATION AT A HEALTH CARE FACILITY: ICD-10-CM

## 2019-11-15 PROBLEM — R60.9 EDEMA: Status: ACTIVE | Noted: 2017-12-07

## 2019-11-15 PROCEDURE — 99214 OFFICE O/P EST MOD 30 MIN: CPT | Performed by: FAMILY MEDICINE
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
## 2019-11-20 ENCOUNTER — TELEPHONE (OUTPATIENT)
Dept: FAMILY MEDICINE CLINIC | Facility: CLINIC | Age: 79
End: 2019-11-20
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
## 2019-11-20 NOTE — TELEPHONE ENCOUNTER
Juan Rodgers of Dr. Mariangel Diallo recommendations and she expressed full understanding and agreement. Task completed.

## 2019-11-20 NOTE — TELEPHONE ENCOUNTER
Pt has NOT been using the Medi-honey on the rough raised area of skin as different pharmacies researched and no one had this, had to order the medi-honey, picked up last night. Crusty area has mostly come off of pt's upper arm, Deltoid.  Per Ariane Lazcano, the

## 2019-11-20 NOTE — TELEPHONE ENCOUNTER
Edgar Christie calling to confirm that she should continue to cover the pink areas after applying silvadene, advised her YES. Confirmed this with Dr. Jenene Cabot also. She states she will look at her schedule and call back to schedule pt's follow up.   Task complet

## 2019-11-20 NOTE — TELEPHONE ENCOUNTER
If crusty area is gone, do not need to put medihoney. I would continue the silvadene. F/u with me next week.

## 2019-11-25 ENCOUNTER — TELEPHONE (OUTPATIENT)
Dept: ENDOCRINOLOGY CLINIC | Facility: CLINIC | Age: 79
End: 2019-11-25

## 2019-11-25 DIAGNOSIS — E11.65 TYPE 2 DIABETES MELLITUS WITH HYPERGLYCEMIA, WITHOUT LONG-TERM CURRENT USE OF INSULIN (HCC): ICD-10-CM

## 2019-11-25 RX ORDER — NATEGLINIDE 60 MG/1
TABLET, COATED ORAL
Qty: 90 TABLET | Refills: 0 | Status: SHIPPED | OUTPATIENT
Start: 2019-11-25 | End: 2019-12-18 | Stop reason: DRUGHIGH

## 2019-12-10 DIAGNOSIS — E11.65 TYPE 2 DIABETES MELLITUS WITH HYPERGLYCEMIA, WITHOUT LONG-TERM CURRENT USE OF INSULIN (HCC): ICD-10-CM

## 2019-12-10 RX ORDER — NATEGLINIDE 60 MG/1
TABLET, COATED ORAL
Qty: 90 TABLET | Refills: 0 | OUTPATIENT
Start: 2019-12-10

## 2019-12-10 NOTE — TELEPHONE ENCOUNTER
Pt's daughter in law stated pt needs the below script. She had previously requested it but it's not at pharmacy. Lizbet FRENCH would like a call back at [phone] when script is sent. Please advise. .     nateglinide 120 MG Oral Tab 90 tablet 0 11/13/

## 2019-12-10 NOTE — TELEPHONE ENCOUNTER
Pt's daughter in law stated pt needs the below script. She had previously requested it but it's not at pharmacy. Lizbet FRENCH would like a call back at [phone] when script is sent. Please advise. .    nateglinide 120 MG Oral Tab 90 tablet 0 11/13/2

## 2019-12-18 ENCOUNTER — HOSPITAL ENCOUNTER (OUTPATIENT)
Dept: LAB | Facility: HOSPITAL | Age: 79
Discharge: HOME | End: 2019-12-18
Attending: INTERNAL MEDICINE
Payer: MEDICAID

## 2019-12-18 ENCOUNTER — HOSPITAL ENCOUNTER (OUTPATIENT)
Dept: CV DIAGNOSTICS | Facility: HOSPITAL | Age: 79
Discharge: HOME | End: 2019-12-18
Attending: INTERNAL MEDICINE
Payer: MEDICAID

## 2019-12-18 ENCOUNTER — TELEPHONE (OUTPATIENT)
Dept: ENDOCRINOLOGY CLINIC | Facility: CLINIC | Age: 79
End: 2019-12-18

## 2019-12-18 ENCOUNTER — LAB ENCOUNTER (OUTPATIENT)
Dept: LAB | Facility: HOSPITAL | Age: 79
End: 2019-12-18
Attending: FAMILY MEDICINE
Payer: MEDICAID

## 2019-12-18 DIAGNOSIS — Z00.00 ROUTINE GENERAL MEDICAL EXAMINATION AT A HEALTH CARE FACILITY: ICD-10-CM

## 2019-12-18 DIAGNOSIS — R42 DIZZINESS: ICD-10-CM

## 2019-12-18 DIAGNOSIS — I48.20 ATRIAL FIBRILLATION, CHRONIC (HCC): ICD-10-CM

## 2019-12-18 LAB
ABSOLUTE IMMATURE GRANULOCYTES (OFFPRE24): NORMAL
ALBUMIN SERPL-MCNC: 3.7 G/DL
ALBUMIN/GLOB SERPL: NORMAL {RATIO}
ALP SERPL-CCNC: 101 U/L
ALT SERPL-CCNC: 54 U/L
ANION GAP SERPL CALC-SCNC: NORMAL MMOL/L
AST SERPL-CCNC: 45 U/L
BASO+EOS+MONOS # BLD: NORMAL 10*3/UL
BASO+EOS+MONOS NFR BLD: NORMAL %
BASOPHILS # BLD: NORMAL 10*3/UL
BASOPHILS NFR BLD: NORMAL %
BILIRUB SERPL-MCNC: 0.5 MG/DL
BUN SERPL-MCNC: 24 MG/DL
BUN/CREAT SERPL: NORMAL
CALCIUM SERPL-MCNC: 9.9 MG/DL
CHLORIDE SERPL-SCNC: 106 MMOL/L
CHOLEST SERPL-MCNC: 125 MG/DL
CHOLEST/HDLC SERPL: NORMAL {RATIO}
CO2 SERPL-SCNC: NORMAL MMOL/L
CREAT SERPL-MCNC: 1.61 MG/DL
DIFFERENTIAL METHOD BLD: NORMAL
EOSINOPHIL # BLD: NORMAL 10*3/UL
EOSINOPHIL NFR BLD: NORMAL %
ERYTHROCYTE [DISTWIDTH] IN BLOOD: NORMAL %
EST. AVERAGE GLUCOSE BLD GHB EST-MCNC: NORMAL MG/DL
GLOBULIN SER-MCNC: 3.8 G/DL
GLUCOSE SERPL-MCNC: 232 MG/DL
HBA1C MFR BLD: 9.6 %
HBA1C MFR BLD: NORMAL % (ref 4.5–5.6)
HCT VFR BLD CALC: 29.9 %
HDLC SERPL-MCNC: 39 MG/DL
HGB BLD-MCNC: 9.3 G/DL
IMMATURE GRANULOCYTES (OFFPRE25): NORMAL
LDLC SERPL CALC-MCNC: 65 MG/DL
LENGTH OF FAST TIME PATIENT: NORMAL H
LENGTH OF FAST TIME PATIENT: NORMAL H
LYMPHOCYTES # BLD: NORMAL 10*3/UL
LYMPHOCYTES NFR BLD: NORMAL %
MCH RBC QN AUTO: NORMAL PG
MCHC RBC AUTO-ENTMCNC: NORMAL G/DL
MCV RBC AUTO: NORMAL FL
MONOCYTES # BLD: NORMAL 10*3/UL
MONOCYTES NFR BLD: NORMAL %
MPV (OFFPRE2): NORMAL
NEUTROPHILS # BLD: NORMAL 10*3/UL
NEUTROPHILS NFR BLD: NORMAL %
NONHDLC SERPL-MCNC: NORMAL MG/DL
NRBC BLD MANUAL-RTO: NORMAL %
PLAT MORPH BLD: NORMAL
PLATELET # BLD: 157 10*3/UL
POTASSIUM SERPL-SCNC: 4.7 MMOL/L
PROT SERPL-MCNC: 7.5 G/DL
RBC # BLD: 4.6 10*6/UL
RBC MORPH BLD: NORMAL
SODIUM SERPL-SCNC: 137 MMOL/L
T4 FREE SERPL-MCNC: 1.5 NG/DL
TRIGL SERPL-MCNC: 103 MG/DL
TSH SERPL-ACNC: 3.01 M[IU]/L
VLDLC SERPL CALC-MCNC: NORMAL MG/DL
WBC # BLD: 4.7 10*3/UL
WBC MORPH BLD: NORMAL

## 2019-12-18 PROCEDURE — 93271 ECG/MONITORING AND ANALYSIS: CPT | Performed by: INTERNAL MEDICINE

## 2019-12-18 PROCEDURE — 80053 COMPREHEN METABOLIC PANEL: CPT | Performed by: FAMILY MEDICINE

## 2019-12-18 PROCEDURE — 93272 ECG/REVIEW INTERPRET ONLY: CPT | Performed by: INTERNAL MEDICINE

## 2019-12-18 PROCEDURE — 36415 COLL VENOUS BLD VENIPUNCTURE: CPT | Performed by: FAMILY MEDICINE

## 2019-12-18 PROCEDURE — 84439 ASSAY OF FREE THYROXINE: CPT | Performed by: FAMILY MEDICINE

## 2019-12-18 PROCEDURE — 93270 REMOTE 30 DAY ECG REV/REPORT: CPT | Performed by: INTERNAL MEDICINE

## 2019-12-18 PROCEDURE — 84443 ASSAY THYROID STIM HORMONE: CPT | Performed by: FAMILY MEDICINE

## 2019-12-18 PROCEDURE — 84153 ASSAY OF PSA TOTAL: CPT | Performed by: FAMILY MEDICINE

## 2019-12-18 PROCEDURE — 85025 COMPLETE CBC W/AUTO DIFF WBC: CPT | Performed by: FAMILY MEDICINE

## 2019-12-18 PROCEDURE — 80061 LIPID PANEL: CPT | Performed by: FAMILY MEDICINE

## 2019-12-18 PROCEDURE — 83036 HEMOGLOBIN GLYCOSYLATED A1C: CPT | Performed by: FAMILY MEDICINE

## 2019-12-18 RX ORDER — GLIPIZIDE 2.5 MG/1
2.5 TABLET, EXTENDED RELEASE ORAL
Qty: 30 TABLET | Refills: 1 | Status: SHIPPED | OUTPATIENT
Start: 2019-12-18 | End: 2019-12-23

## 2019-12-18 RX ORDER — NATEGLINIDE 120 MG/1
120 TABLET, COATED ORAL
Qty: 90 TABLET | Refills: 1 | Status: SHIPPED | OUTPATIENT
Start: 2019-12-18 | End: 2019-12-18 | Stop reason: ALTCHOICE
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
## 2019-12-18 NOTE — TELEPHONE ENCOUNTER
starlix 120mg TID w meal   Message sent to [facility] and advised to cancel the 60mg dose to avoid filling wrong starlix rx.

## 2019-12-18 NOTE — TELEPHONE ENCOUNTER
In past he had hypoglycemia with glipizide.    Cautiously revisit Glipizide   His trends are most likely higher due to having to decrease the Metformin due to decline in kidney function   He did not show for last appt and there is not a future appt schedule

## 2019-12-18 NOTE — TELEPHONE ENCOUNTER
Called patient's daughter 2 times, finally LVM on 3rd call regarding ok to restart Glipizide XL 2.5 once daily. Also recommended that she make appt for placement of Silvina sensor in the next 1-2 weeks and follow up with APN 2-4 weeks.

## 2019-12-18 NOTE — TELEPHONE ENCOUNTER
Spoke to daughter and she is concerned with the blood sugars     12/17 fasting 140 10pm 180  12/16no readings  12/15 fasting 140 10pm 180  12/14 fasting 161 10pm 199  12/13 fasting 185 10pm 200  12/12 fasting 190 10pm 210  12/11 fasting 190 10pm 210  12/10

## 2019-12-19 ENCOUNTER — TELEPHONE (OUTPATIENT)
Dept: FAMILY MEDICINE CLINIC | Facility: CLINIC | Age: 79
End: 2019-12-19

## 2019-12-19 ENCOUNTER — CLINICAL ABSTRACT (OUTPATIENT)
Dept: CARDIOLOGY | Age: 79
End: 2019-12-19

## 2019-12-19 ENCOUNTER — TELEPHONE (OUTPATIENT)
Dept: CARDIOLOGY | Age: 79
End: 2019-12-19

## 2019-12-19 DIAGNOSIS — D64.9 LOW HEMOGLOBIN: Primary | ICD-10-CM

## 2019-12-19 NOTE — TELEPHONE ENCOUNTER
Please continue to f/u with diabetic clinic for bs control. Hemoglobin is low. Needs further evaluation. Please see hematologist. May also need to see GI.

## 2019-12-19 NOTE — TELEPHONE ENCOUNTER
Informed Jamila Garcia of Dr. Priscilla Rainey review and recommendations, answered questions and she expressed understanding and agreement. Gave contact information for both Dr. Chuck Lynne and Dr. Angie Little practices. Entered Referrals.  Jamila Garcia states she will call Shy Medrano
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
## 2019-12-20 ENCOUNTER — TELEPHONE (OUTPATIENT)
Dept: ENDOCRINOLOGY CLINIC | Facility: CLINIC | Age: 79
End: 2019-12-20

## 2019-12-20 NOTE — TELEPHONE ENCOUNTER
Spoke with daughter Danny Nevarez and told her to increase the glipizide to twice daily one with breakfast and one with dinner. She verbalized understanding the plan and will start the increased dose today. Scheduled to see Laurent Krause on Monday.  Will need another R

## 2019-12-20 NOTE — TELEPHONE ENCOUNTER
Patient's daughter called concerned about increased A1c. Labs were done by cardiologist this week. .. fasting glucose was 232 . .. A1c is 9.6% She called to see if any med changes should be made. Patient was just put back on Glipizide and is scheduled with Ci

## 2019-12-20 NOTE — TELEPHONE ENCOUNTER
Increase glipizide to twice daily - take with breakfast and dinner.   Once the sensor is evaluated - we can decide what is next

## 2019-12-23 ENCOUNTER — DIABETIC EDUCATION (OUTPATIENT)
Dept: ENDOCRINOLOGY CLINIC | Facility: CLINIC | Age: 79
End: 2019-12-23
Payer: MEDICAID

## 2019-12-23 DIAGNOSIS — E11.65 TYPE 2 DIABETES MELLITUS WITH HYPERGLYCEMIA, WITHOUT LONG-TERM CURRENT USE OF INSULIN (HCC): Primary | ICD-10-CM

## 2019-12-23 PROCEDURE — 99211 OFF/OP EST MAY X REQ PHY/QHP: CPT | Performed by: DIETITIAN, REGISTERED

## 2019-12-23 RX ORDER — GLIPIZIDE 2.5 MG/1
5 TABLET, EXTENDED RELEASE ORAL
Qty: 60 TABLET | Refills: 1 | Status: SHIPPED | OUTPATIENT
Start: 2019-12-23 | End: 2020-01-06

## 2019-12-23 NOTE — PROGRESS NOTES
Start time: 11:00 End time: 11:30  Referring Provider: [facility]    Pt here with wife and son-in-law. We called dtr Yodit Paulson and put her on speaker phone. Metformin dose reduced due to kidney function. Pt currently taking glipizide 2.5 mg BID.     S/

## 2019-12-26 ENCOUNTER — TELEPHONE (OUTPATIENT)
Dept: CARDIOLOGY | Age: 79
End: 2019-12-26

## 2019-12-26 ENCOUNTER — OFFICE VISIT (OUTPATIENT)
Dept: CARDIOLOGY | Age: 79
End: 2019-12-26

## 2019-12-26 VITALS
DIASTOLIC BLOOD PRESSURE: 80 MMHG | HEART RATE: 63 BPM | HEIGHT: 65 IN | SYSTOLIC BLOOD PRESSURE: 138 MMHG | BODY MASS INDEX: 28.82 KG/M2 | WEIGHT: 173 LBS

## 2019-12-26 DIAGNOSIS — Z95.5 S/P PRIMARY ANGIOPLASTY WITH CORONARY STENT: ICD-10-CM

## 2019-12-26 DIAGNOSIS — E78.00 PURE HYPERCHOLESTEROLEMIA: ICD-10-CM

## 2019-12-26 DIAGNOSIS — I25.10 CAD IN NATIVE ARTERY: Primary | ICD-10-CM

## 2019-12-26 DIAGNOSIS — I10 BENIGN ESSENTIAL HTN: ICD-10-CM

## 2019-12-26 DIAGNOSIS — I65.23 ASYMPTOMATIC CAROTID ARTERY STENOSIS, BILATERAL: ICD-10-CM

## 2019-12-26 PROCEDURE — 3075F SYST BP GE 130 - 139MM HG: CPT | Performed by: INTERNAL MEDICINE

## 2019-12-26 PROCEDURE — 3079F DIAST BP 80-89 MM HG: CPT | Performed by: INTERNAL MEDICINE

## 2019-12-26 PROCEDURE — 99214 OFFICE O/P EST MOD 30 MIN: CPT | Performed by: INTERNAL MEDICINE

## 2019-12-26 RX ORDER — CLOPIDOGREL BISULFATE 75 MG/1
TABLET ORAL
Qty: 90 TABLET | Refills: 3 | Status: SHIPPED | OUTPATIENT
Start: 2019-12-26 | End: 2020-01-03 | Stop reason: ALTCHOICE

## 2019-12-26 ASSESSMENT — PATIENT HEALTH QUESTIONNAIRE - PHQ9
1. LITTLE INTEREST OR PLEASURE IN DOING THINGS: NOT AT ALL
SUM OF ALL RESPONSES TO PHQ9 QUESTIONS 1 AND 2: 0
SUM OF ALL RESPONSES TO PHQ9 QUESTIONS 1 AND 2: 0
2. FEELING DOWN, DEPRESSED OR HOPELESS: NOT AT ALL

## 2019-12-30 NOTE — PROGRESS NOTES
[facility]    Diagnosis: Anemia     Treatment History: None    Visit Diagnosis:  Anemia, unspecified type  (primary encounter diagnosis)  Iron deficiency anemia due to chronic blood loss    History of Present Illness: 79M wi Disp: , Rfl: 2  furosemide 40 MG Oral Tab, , Disp: , Rfl: 0  amiodarone HCl 200 MG Oral Tab, Take 1 tablet (200 mg total) by mouth 2 (two) times daily with meals.  Take twice daily for 2 weeks, then decrease to once daily, Disp: 60 tablet, Rfl: 3  rivaroxab (01/02 1435)  Weight: 79.4 kg (175 lb) (01/02 1435)  BSA (Calculated - sq m): 1.91 sq meters (01/02 1435)  Pulse: 62 (01/02 1435)  BP: 165/74 (01/02 1435)  Temp: 98.8 °F (37.1 °C) (01/02 1435)  Do Not Use - Resp Rate: --  SpO2: 99 % (01/02 1435)     Genera

## 2020-01-02 ENCOUNTER — OFFICE VISIT (OUTPATIENT)
Dept: HEMATOLOGY/ONCOLOGY | Facility: HOSPITAL | Age: 80
End: 2020-01-02
Attending: INTERNAL MEDICINE
Payer: MEDICAID

## 2020-01-02 VITALS
OXYGEN SATURATION: 99 % | WEIGHT: 175 LBS | TEMPERATURE: 99 F | HEIGHT: 67 IN | DIASTOLIC BLOOD PRESSURE: 74 MMHG | RESPIRATION RATE: 16 BRPM | HEART RATE: 62 BPM | BODY MASS INDEX: 27.47 KG/M2 | SYSTOLIC BLOOD PRESSURE: 165 MMHG

## 2020-01-02 VITALS
HEART RATE: 52 BPM | SYSTOLIC BLOOD PRESSURE: 172 MMHG | DIASTOLIC BLOOD PRESSURE: 74 MMHG | RESPIRATION RATE: 20 BRPM | TEMPERATURE: 98 F | OXYGEN SATURATION: 98 %

## 2020-01-02 DIAGNOSIS — D50.0 IRON DEFICIENCY ANEMIA DUE TO CHRONIC BLOOD LOSS: ICD-10-CM

## 2020-01-02 DIAGNOSIS — D64.9 ANEMIA, UNSPECIFIED TYPE: Primary | ICD-10-CM

## 2020-01-02 DIAGNOSIS — D50.0 IRON DEFICIENCY ANEMIA DUE TO CHRONIC BLOOD LOSS: Primary | ICD-10-CM

## 2020-01-02 LAB
ALBUMIN SERPL-MCNC: 3.7 G/DL (ref 3.4–5)
ALBUMIN/GLOB SERPL: 0.9 {RATIO} (ref 1–2)
ALP LIVER SERPL-CCNC: 99 U/L (ref 45–117)
ALT SERPL-CCNC: 58 U/L (ref 16–61)
ANION GAP SERPL CALC-SCNC: 5 MMOL/L (ref 0–18)
AST SERPL-CCNC: 40 U/L (ref 15–37)
BASOPHILS # BLD AUTO: 0.01 X10(3) UL (ref 0–0.2)
BASOPHILS NFR BLD AUTO: 0.2 %
BILIRUB SERPL-MCNC: 0.6 MG/DL (ref 0.1–2)
BUN BLD-MCNC: 29 MG/DL (ref 7–18)
BUN/CREAT SERPL: 19.1 (ref 10–20)
CALCIUM BLD-MCNC: 9.1 MG/DL (ref 8.5–10.1)
CHLORIDE SERPL-SCNC: 110 MMOL/L (ref 98–112)
CO2 SERPL-SCNC: 22 MMOL/L (ref 21–32)
CREAT BLD-MCNC: 1.52 MG/DL (ref 0.7–1.3)
DEPRECATED HBV CORE AB SER IA-ACNC: 18.4 NG/ML (ref 30–530)
DEPRECATED RDW RBC AUTO: 39.6 FL (ref 35.1–46.3)
EOSINOPHIL # BLD AUTO: 0.03 X10(3) UL (ref 0–0.7)
EOSINOPHIL NFR BLD AUTO: 0.6 %
ERYTHROCYTE [DISTWIDTH] IN BLOOD BY AUTOMATED COUNT: 18.3 % (ref 11–15)
FOLATE SERPL-MCNC: 16.6 NG/ML (ref 8.7–?)
GLOBULIN PLAS-MCNC: 3.9 G/DL (ref 2.8–4.4)
GLUCOSE BLD-MCNC: 190 MG/DL (ref 70–99)
HCT VFR BLD AUTO: 29.3 % (ref 39–53)
HGB BLD-MCNC: 8.9 G/DL (ref 13–17.5)
HGB RETIC QN AUTO: 21.5 PG (ref 28.2–36.6)
IMM GRANULOCYTES # BLD AUTO: 0.01 X10(3) UL (ref 0–1)
IMM GRANULOCYTES NFR BLD: 0.2 %
IMM RETICS NFR: 0.3 RATIO (ref 0.1–0.3)
IRON SATURATION: 12 % (ref 20–50)
IRON SERPL-MCNC: 64 UG/DL (ref 65–175)
LYMPHOCYTES # BLD AUTO: 1.42 X10(3) UL (ref 1–4)
LYMPHOCYTES NFR BLD AUTO: 29 %
M PROTEIN MFR SERPL ELPH: 7.6 G/DL (ref 6.4–8.2)
MCH RBC QN AUTO: 19.4 PG (ref 26–34)
MCHC RBC AUTO-ENTMCNC: 30.4 G/DL (ref 31–37)
MCV RBC AUTO: 64 FL (ref 80–100)
MONOCYTES # BLD AUTO: 0.47 X10(3) UL (ref 0.1–1)
MONOCYTES NFR BLD AUTO: 9.6 %
NEUTROPHILS # BLD AUTO: 2.95 X10 (3) UL (ref 1.5–7.7)
NEUTROPHILS # BLD AUTO: 2.95 X10(3) UL (ref 1.5–7.7)
NEUTROPHILS NFR BLD AUTO: 60.4 %
OSMOLALITY SERPL CALC.SUM OF ELEC: 295 MOSM/KG (ref 275–295)
PATIENT FASTING Y/N/NP: NO
PLATELET # BLD AUTO: 172 10(3)UL (ref 150–450)
POTASSIUM SERPL-SCNC: 5.3 MMOL/L (ref 3.5–5.1)
RBC # BLD AUTO: 4.58 X10(6)UL (ref 3.8–5.8)
RETICS # AUTO: 78.4 X10(3) UL (ref 22.5–147.5)
RETICS/RBC NFR AUTO: 1.7 % (ref 0.5–2.5)
SODIUM SERPL-SCNC: 137 MMOL/L (ref 136–145)
TOTAL IRON BINDING CAPACITY: 536 UG/DL (ref 240–450)
TRANSFERRIN SERPL-MCNC: 360 MG/DL (ref 200–360)
VIT B12 SERPL-MCNC: 310 PG/ML (ref 193–986)
WBC # BLD AUTO: 4.9 X10(3) UL (ref 4–11)

## 2020-01-02 PROCEDURE — 96374 THER/PROPH/DIAG INJ IV PUSH: CPT

## 2020-01-02 PROCEDURE — 99245 OFF/OP CONSLTJ NEW/EST HI 55: CPT | Performed by: INTERNAL MEDICINE

## 2020-01-02 NOTE — PROGRESS NOTES
Education Record    Learner:  Patient, Spouse and Family Member    Disease / Diagnosis:  Anemia    Barriers / Limitations:  None   Comments:    Method:  Discussion and Printed material   Comments:    General Topics:  Medication, Precautions, Procedure and

## 2020-01-02 NOTE — PATIENT INSTRUCTIONS
Ferumoxytol injection  Brand Name: Jack Base  What is this medicine? FERUMOXYTOL is an iron complex. Iron is used to make healthy red blood cells, which carry oxygen and nutrients throughout the body.  This medicine is used to treat iron deficiency anemia What should I watch for while using this medicine? Visit your doctor or healthcare professional regularly. Tell your doctor or healthcare professional if your symptoms do not start to get better or if they get worse.  You may need blood work done while you

## 2020-01-02 NOTE — PROGRESS NOTES
Education Record    Learner:  Patient, Spouse and Family Member    Disease / Diagnosis:    Barriers / Limitations:  Language   Comments:    Method:  Discussion   Comments:    General Topics:  Plan of care reviewed   Comments:    Outcome:  Shows understandi

## 2020-01-03 ENCOUNTER — TELEPHONE (OUTPATIENT)
Dept: CARDIOLOGY | Age: 80
End: 2020-01-03

## 2020-01-03 LAB — ERYTHROPOIETIN (EPO): 71 MU/ML

## 2020-01-06 ENCOUNTER — OFFICE VISIT (OUTPATIENT)
Dept: ENDOCRINOLOGY CLINIC | Facility: CLINIC | Age: 80
End: 2020-01-06
Payer: MEDICAID

## 2020-01-06 ENCOUNTER — OFFICE VISIT (OUTPATIENT)
Dept: HEMATOLOGY/ONCOLOGY | Facility: HOSPITAL | Age: 80
End: 2020-01-06
Attending: INTERNAL MEDICINE
Payer: MEDICAID

## 2020-01-06 VITALS
DIASTOLIC BLOOD PRESSURE: 71 MMHG | OXYGEN SATURATION: 98 % | HEART RATE: 50 BPM | TEMPERATURE: 98 F | RESPIRATION RATE: 16 BRPM | SYSTOLIC BLOOD PRESSURE: 158 MMHG

## 2020-01-06 VITALS
RESPIRATION RATE: 20 BRPM | HEIGHT: 67 IN | HEART RATE: 68 BPM | SYSTOLIC BLOOD PRESSURE: 138 MMHG | WEIGHT: 177 LBS | BODY MASS INDEX: 27.78 KG/M2 | DIASTOLIC BLOOD PRESSURE: 70 MMHG

## 2020-01-06 DIAGNOSIS — E11.65 TYPE 2 DIABETES MELLITUS WITH HYPERGLYCEMIA, WITHOUT LONG-TERM CURRENT USE OF INSULIN (HCC): Primary | ICD-10-CM

## 2020-01-06 DIAGNOSIS — D50.0 IRON DEFICIENCY ANEMIA DUE TO CHRONIC BLOOD LOSS: Primary | ICD-10-CM

## 2020-01-06 LAB
ALBUMIN SERPL ELPH-MCNC: 4.15 G/DL (ref 3.75–5.21)
ALBUMIN/GLOB SERPL: 1.32 {RATIO} (ref 1–2)
ALPHA1 GLOB SERPL ELPH-MCNC: 0.29 G/DL (ref 0.19–0.46)
ALPHA2 GLOB SERPL ELPH-MCNC: 0.97 G/DL (ref 0.48–1.05)
B-GLOBULIN SERPL ELPH-MCNC: 0.9 G/DL (ref 0.68–1.23)
BASOPHILS # BLD AUTO: 0.01 X10(3) UL (ref 0–0.2)
BASOPHILS NFR BLD AUTO: 0.2 %
DEPRECATED RDW RBC AUTO: 41.1 FL (ref 35.1–46.3)
EOSINOPHIL # BLD AUTO: 0.02 X10(3) UL (ref 0–0.7)
EOSINOPHIL NFR BLD AUTO: 0.4 %
ERYTHROCYTE [DISTWIDTH] IN BLOOD BY AUTOMATED COUNT: 18.2 % (ref 11–15)
GAMMA GLOB SERPL ELPH-MCNC: 0.99 G/DL (ref 0.62–1.7)
HCT VFR BLD AUTO: 28.7 % (ref 39–53)
HGB BLD-MCNC: 8.7 G/DL (ref 13–17.5)
IMM GRANULOCYTES # BLD AUTO: 0.03 X10(3) UL (ref 0–1)
IMM GRANULOCYTES NFR BLD: 0.6 %
LYMPHOCYTES # BLD AUTO: 1.39 X10(3) UL (ref 1–4)
LYMPHOCYTES NFR BLD AUTO: 30 %
M PROTEIN MFR SERPL ELPH: 7.3 G/DL (ref 6.4–8.2)
MCH RBC QN AUTO: 19.8 PG (ref 26–34)
MCHC RBC AUTO-ENTMCNC: 30.3 G/DL (ref 31–37)
MCV RBC AUTO: 65.4 FL (ref 80–100)
MONOCYTES # BLD AUTO: 0.58 X10(3) UL (ref 0.1–1)
MONOCYTES NFR BLD AUTO: 12.5 %
NEUTROPHILS # BLD AUTO: 2.6 X10 (3) UL (ref 1.5–7.7)
NEUTROPHILS # BLD AUTO: 2.6 X10(3) UL (ref 1.5–7.7)
NEUTROPHILS NFR BLD AUTO: 56.3 %
PLATELET # BLD AUTO: 161 10(3)UL (ref 150–450)
RBC # BLD AUTO: 4.39 X10(6)UL (ref 3.8–5.8)
WBC # BLD AUTO: 4.6 X10(3) UL (ref 4–11)

## 2020-01-06 PROCEDURE — 95251 CONT GLUC MNTR ANALYSIS I&R: CPT | Performed by: NURSE PRACTITIONER

## 2020-01-06 PROCEDURE — 99214 OFFICE O/P EST MOD 30 MIN: CPT | Performed by: NURSE PRACTITIONER

## 2020-01-06 PROCEDURE — 36415 COLL VENOUS BLD VENIPUNCTURE: CPT

## 2020-01-06 PROCEDURE — 85025 COMPLETE CBC W/AUTO DIFF WBC: CPT

## 2020-01-06 PROCEDURE — 95250 CONT GLUC MNTR PHYS/QHP EQP: CPT | Performed by: NURSE PRACTITIONER

## 2020-01-06 PROCEDURE — 96374 THER/PROPH/DIAG INJ IV PUSH: CPT

## 2020-01-06 RX ORDER — FLASH GLUCOSE SENSOR
KIT MISCELLANEOUS
Qty: 2 EACH | Refills: 1 | Status: ON HOLD | OUTPATIENT
Start: 2020-01-06 | End: 2020-02-18

## 2020-01-06 RX ORDER — GLIPIZIDE 2.5 MG/1
2.5 TABLET, EXTENDED RELEASE ORAL
Qty: 30 TABLET | Refills: 1 | Status: SHIPPED | OUTPATIENT
Start: 2020-01-06 | End: 2020-03-02

## 2020-01-06 NOTE — PATIENT INSTRUCTIONS
Stop PM glipizide   Continue Glipzide 2.5mg once daily in AM     Start Tradjenta 5mg once daily   Ok to take with Glipizide and Metformin      Blood sugar targets:   Fasting above 90- 140   After meals: 2 hours less than 180-200       We will see if we can

## 2020-01-06 NOTE — PROGRESS NOTES
Pt here for Feraheme.   Arrives Ambulating independently, accompanied by Spouse and Family member           Patient reports possible pregnancy since last therapy cycle: Not Applicable    Modifications in dose or schedule: No     Frequency of blood return an

## 2020-01-06 NOTE — PROGRESS NOTES
Patient presents with:  Diabetes: room-17 cf. pt did bring readings. HPI:   Ayesha Mcmahon is a 67year old male presenting for type 2 DM medication management. In the past 3m, DM control has increased to 9.6% (up from A1C 7.1%)  Having black tar stools.  Hbg MICROALBCREA 343.0 (H) 10/22/2019    CREATSERUM 1.52 (H) 01/02/2020    GFRNAA 43 (L) 01/02/2020    GFRAA 50 (L) 01/02/2020       SMBG patterns: Contour Next  meter   Testing BG x  4 daily  Meter or log book today: yes, log reviewed   + hypoglycemia   Recen Tab 100 mg.    0   • AmLODIPine Besylate 5 MG Oral Tab   2   • KLOR-CON M10 10 MEQ Oral Tab CR   2   • furosemide 40 MG Oral Tab   0   • amiodarone HCl 200 MG Oral Tab Take 1 tablet (200 mg total) by mouth 2 (two) times daily with meals.  Take twice daily f dysuria. Musculoskeletal: Negative for joint pain. Skin: Negative for rash. Neurological: Positive for numbness.         To hands/fingers   Hematological:        No hypoglycemia on SMBG but + on sensor download   Psychiatric/Behavioral: Negative for s patient indicates understanding of these issues and agrees to the plan.         Orders Placed This Encounter      linagliptin (TRADJENTA) 5 mg Oral Tab          Sig: Take 1 tablet (5 mg total) by mouth daily.           Dispense:  30 tablet          Refill:

## 2020-01-07 LAB
KAPPA FREE LIGHT CHAIN: 5.84 MG/DL (ref 0.33–1.94)
KAPPA/LAMBDA FLC RATIO: 2.13 (ref 0.26–1.65)
LAMBDA FREE LIGHT CHAIN: 2.75 MG/DL (ref 0.57–2.63)

## 2020-01-07 RX ORDER — BLOOD-GLUCOSE,RECEIVER,CONT
1 EACH MISCELLANEOUS ONCE
Qty: 1 DEVICE | Refills: 0 | Status: SHIPPED | OUTPATIENT
Start: 2020-01-07 | End: 2020-01-07

## 2020-01-07 RX ORDER — BLOOD-GLUCOSE SENSOR
EACH MISCELLANEOUS
Refills: 0 | Status: CANCELLED | OUTPATIENT
Start: 2020-01-07

## 2020-01-07 RX ORDER — BLOOD-GLUCOSE SENSOR
1 EACH MISCELLANEOUS
Qty: 3 EACH | Refills: 12 | Status: ON HOLD | OUTPATIENT
Start: 2020-01-07 | End: 2020-02-18

## 2020-01-07 RX ORDER — BLOOD-GLUCOSE TRANSMITTER
EACH MISCELLANEOUS
Qty: 1 EACH | Refills: 3 | Status: ON HOLD | OUTPATIENT
Start: 2020-01-07 | End: 2020-02-18

## 2020-01-07 RX ORDER — BLOOD-GLUCOSE TRANSMITTER
EACH MISCELLANEOUS
Refills: 0 | Status: CANCELLED | OUTPATIENT
Start: 2020-01-07

## 2020-01-08 ENCOUNTER — TELEPHONE (OUTPATIENT)
Dept: CARDIOLOGY | Age: 80
End: 2020-01-08

## 2020-01-08 RX ORDER — CLOPIDOGREL BISULFATE 75 MG/1
75 TABLET ORAL DAILY
COMMUNITY

## 2020-01-08 NOTE — TELEPHONE ENCOUNTER
Received fax from [phone] stating that personal Kaleigh Padron is not covered but dexcom is per CAB to call pt daughter to let her know pt verbalize understanding and is aware to call once she receives it. pended orders to sign.

## 2020-01-09 ENCOUNTER — HOSPITAL ENCOUNTER (OUTPATIENT)
Facility: HOSPITAL | Age: 80
Setting detail: HOSPITAL OUTPATIENT SURGERY
Discharge: HOME | End: 2020-01-09
Attending: INTERNAL MEDICINE | Admitting: INTERNAL MEDICINE
Payer: MEDICAID

## 2020-01-09 VITALS
TEMPERATURE: 98 F | RESPIRATION RATE: 18 BRPM | HEART RATE: 54 BPM | BODY MASS INDEX: 27.15 KG/M2 | WEIGHT: 173 LBS | OXYGEN SATURATION: 97 % | DIASTOLIC BLOOD PRESSURE: 70 MMHG | SYSTOLIC BLOOD PRESSURE: 146 MMHG | HEIGHT: 67 IN

## 2020-01-09 DIAGNOSIS — D50.9 IRON DEFICIENCY ANEMIA, UNSPECIFIED IRON DEFICIENCY ANEMIA TYPE: ICD-10-CM

## 2020-01-09 PROCEDURE — 0DB58ZX EXCISION OF ESOPHAGUS, VIA NATURAL OR ARTIFICIAL OPENING ENDOSCOPIC, DIAGNOSTIC: ICD-10-PCS | Performed by: INTERNAL MEDICINE

## 2020-01-09 PROCEDURE — 0DJD8ZZ INSPECTION OF LOWER INTESTINAL TRACT, VIA NATURAL OR ARTIFICIAL OPENING ENDOSCOPIC: ICD-10-PCS | Performed by: INTERNAL MEDICINE

## 2020-01-09 PROCEDURE — 99153 MOD SED SAME PHYS/QHP EA: CPT | Performed by: INTERNAL MEDICINE

## 2020-01-09 PROCEDURE — 88305 TISSUE EXAM BY PATHOLOGIST: CPT | Performed by: INTERNAL MEDICINE

## 2020-01-09 PROCEDURE — 99152 MOD SED SAME PHYS/QHP 5/>YRS: CPT | Performed by: INTERNAL MEDICINE

## 2020-01-09 PROCEDURE — 0DB78ZX EXCISION OF STOMACH, PYLORUS, VIA NATURAL OR ARTIFICIAL OPENING ENDOSCOPIC, DIAGNOSTIC: ICD-10-PCS | Performed by: INTERNAL MEDICINE

## 2020-01-09 PROCEDURE — 0DB98ZX EXCISION OF DUODENUM, VIA NATURAL OR ARTIFICIAL OPENING ENDOSCOPIC, DIAGNOSTIC: ICD-10-PCS | Performed by: INTERNAL MEDICINE

## 2020-01-09 RX ORDER — SODIUM CHLORIDE, SODIUM LACTATE, POTASSIUM CHLORIDE, CALCIUM CHLORIDE 600; 310; 30; 20 MG/100ML; MG/100ML; MG/100ML; MG/100ML
INJECTION, SOLUTION INTRAVENOUS CONTINUOUS
Status: DISCONTINUED | OUTPATIENT
Start: 2020-01-09 | End: 2020-01-09

## 2020-01-09 RX ORDER — MIDAZOLAM HYDROCHLORIDE 1 MG/ML
INJECTION INTRAMUSCULAR; INTRAVENOUS
Status: DISCONTINUED | OUTPATIENT
Start: 2020-01-09 | End: 2020-01-09
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
## 2020-01-09 NOTE — H&P
The H&P dated 1/8/2020 by Samm Arguello MD was reviewed by Samm Arguello MD today 1/9/2020, the patient was examined and no significant changes have occurred in the patient's condition since the H&P was performed.   I discussed with the patient and/or legal repre

## 2020-01-12 NOTE — PROGRESS NOTES
1/12/2020  [address] [place] [address] 214  [address] [place] [address]    Dear Svitlana Reilly,       Here are the biopsy/pathology findings from your recent EGD (Upper  Endoscopy):     Biopsies taken from the stomach show that you have an infection from a sushma

## 2020-01-13 ENCOUNTER — TELEPHONE (OUTPATIENT)
Dept: CARDIOLOGY | Age: 80
End: 2020-01-13

## 2020-01-13 ENCOUNTER — TELEPHONE (OUTPATIENT)
Dept: ENDOCRINOLOGY CLINIC | Facility: CLINIC | Age: 80
End: 2020-01-13

## 2020-01-13 DIAGNOSIS — I10 BENIGN ESSENTIAL HTN: Primary | ICD-10-CM

## 2020-01-13 RX ORDER — CLOPIDOGREL BISULFATE 75 MG/1
75 TABLET ORAL DAILY
COMMUNITY
End: 2020-10-26 | Stop reason: SDUPTHER

## 2020-01-13 RX ORDER — FUROSEMIDE 40 MG/1
TABLET ORAL
Qty: 48 TABLET | Refills: 1 | Status: SHIPPED | OUTPATIENT
Start: 2020-01-13 | End: 2020-07-20

## 2020-01-13 NOTE — TELEPHONE ENCOUNTER
Called pt daughter in law per CAB to let themknow dexcom and Arcadio Na was not covered by his insurance because he is not in multiple daily injections.  If they would like to buy the Arcadio Na sensors out of pocket will have to call us to let us know so we can sent

## 2020-01-13 NOTE — TELEPHONE ENCOUNTER
Started PA for Dexcom G6 with covermymeds  Key: [id number]   Dexcom G6 sensors  Key: [id number]   Dexcom G6 Transmitter  Key: [id number]    Dexcom G6     Received the following: Your information has been submitted to [employer].  Prime is reviewin

## 2020-01-14 NOTE — TELEPHONE ENCOUNTER
Patient's daughter in law called asking questions about why the Dexcom was not covered. I did explain the criteria and patient did not meet it due to not being on 3 or more injections per day.  She would like to know how many times patient should be testing

## 2020-01-14 NOTE — TELEPHONE ENCOUNTER
Spoke with dtr in law and gave her instructions for testing TID. After clarification, she verbalized understanding.

## 2020-01-14 NOTE — TELEPHONE ENCOUNTER
He should be testing at least 2- 3 x daily   Before breakfast, 2 hours after any meal (can vary these meal times) and before bedtime

## 2020-01-20 RX ORDER — HYDRALAZINE HYDROCHLORIDE 10 MG/1
TABLET, FILM COATED ORAL
Qty: 270 TABLET | Refills: 3 | Status: SHIPPED | OUTPATIENT
Start: 2020-01-20 | End: 2020-07-09 | Stop reason: ALTCHOICE

## 2020-01-21 ENCOUNTER — TELEPHONE (OUTPATIENT)
Dept: CARDIOLOGY | Age: 80
End: 2020-01-21

## 2020-01-23 ENCOUNTER — APPOINTMENT (OUTPATIENT)
Dept: CARDIOLOGY | Age: 80
End: 2020-01-23

## 2020-01-27 ENCOUNTER — TELEPHONE (OUTPATIENT)
Dept: CARDIOLOGY | Age: 80
End: 2020-01-27

## 2020-02-11 ENCOUNTER — TELEPHONE (OUTPATIENT)
Dept: CARDIOLOGY | Age: 80
End: 2020-02-11

## 2020-02-12 RX ORDER — ATORVASTATIN CALCIUM 40 MG/1
TABLET, FILM COATED ORAL
Qty: 90 TABLET | Refills: 3 | Status: SHIPPED | OUTPATIENT
Start: 2020-02-12 | End: 2020-11-02 | Stop reason: SDUPTHER

## 2020-02-12 RX ORDER — AMLODIPINE BESYLATE 5 MG/1
TABLET ORAL
Qty: 90 TABLET | Refills: 3 | Status: SHIPPED | OUTPATIENT
Start: 2020-02-12 | End: 2020-11-02 | Stop reason: SDUPTHER

## 2020-02-17 ENCOUNTER — TELEPHONE (OUTPATIENT)
Dept: CARDIOLOGY | Age: 80
End: 2020-02-17

## 2020-02-17 ENCOUNTER — HOSPITAL ENCOUNTER (OUTPATIENT)
Facility: HOSPITAL | Age: 80
Setting detail: OBSERVATION
Discharge: HOME | End: 2020-02-18
Attending: EMERGENCY MEDICINE | Admitting: HOSPITALIST
Payer: MEDICAID

## 2020-02-17 ENCOUNTER — TELEPHONE (OUTPATIENT)
Dept: FAMILY MEDICINE CLINIC | Facility: CLINIC | Age: 80
End: 2020-02-17

## 2020-02-17 ENCOUNTER — APPOINTMENT (OUTPATIENT)
Dept: GENERAL RADIOLOGY | Facility: HOSPITAL | Age: 80
End: 2020-02-17
Attending: EMERGENCY MEDICINE
Payer: MEDICAID

## 2020-02-17 DIAGNOSIS — D64.9 ANEMIA, UNSPECIFIED TYPE: ICD-10-CM

## 2020-02-17 DIAGNOSIS — E11.29 TYPE 2 DIABETES MELLITUS WITH MICROALBUMINURIA, WITHOUT LONG-TERM CURRENT USE OF INSULIN (HCC): ICD-10-CM

## 2020-02-17 DIAGNOSIS — I25.118 CORONARY ARTERY DISEASE INVOLVING NATIVE HEART WITH OTHER FORM OF ANGINA PECTORIS, UNSPECIFIED VESSEL OR LESION TYPE (HCC): ICD-10-CM

## 2020-02-17 DIAGNOSIS — R80.9 TYPE 2 DIABETES MELLITUS WITH MICROALBUMINURIA, WITHOUT LONG-TERM CURRENT USE OF INSULIN (HCC): ICD-10-CM

## 2020-02-17 DIAGNOSIS — I10 ESSENTIAL HYPERTENSION: ICD-10-CM

## 2020-02-17 DIAGNOSIS — R77.8 ELEVATED TROPONIN: Primary | ICD-10-CM

## 2020-02-17 LAB
ALBUMIN SERPL-MCNC: 3.6 G/DL (ref 3.4–5)
ALBUMIN/GLOB SERPL: 0.9 {RATIO} (ref 1–2)
ALP LIVER SERPL-CCNC: 88 U/L (ref 45–117)
ALT SERPL-CCNC: 69 U/L (ref 16–61)
ANION GAP SERPL CALC-SCNC: 5 MMOL/L (ref 0–18)
AST SERPL-CCNC: 70 U/L (ref 15–37)
BASOPHILS # BLD AUTO: 0.01 X10(3) UL (ref 0–0.2)
BASOPHILS NFR BLD AUTO: 0.3 %
BILIRUB SERPL-MCNC: 0.6 MG/DL (ref 0.1–2)
BUN BLD-MCNC: 24 MG/DL (ref 7–18)
BUN/CREAT SERPL: 15.5 (ref 10–20)
CALCIUM BLD-MCNC: 9.3 MG/DL (ref 8.5–10.1)
CHLORIDE SERPL-SCNC: 111 MMOL/L (ref 98–112)
CO2 SERPL-SCNC: 22 MMOL/L (ref 21–32)
CREAT BLD-MCNC: 1.55 MG/DL (ref 0.7–1.3)
D-DIMER: <0.27 UG/ML FEU (ref ?–0.78)
DEPRECATED RDW RBC AUTO: 45.9 FL (ref 35.1–46.3)
EOSINOPHIL # BLD AUTO: 0.02 X10(3) UL (ref 0–0.7)
EOSINOPHIL NFR BLD AUTO: 0.5 %
ERYTHROCYTE [DISTWIDTH] IN BLOOD BY AUTOMATED COUNT: 20.1 % (ref 11–15)
GLOBULIN PLAS-MCNC: 4 G/DL (ref 2.8–4.4)
GLUCOSE BLD-MCNC: 195 MG/DL (ref 70–99)
GLUCOSE BLD-MCNC: 258 MG/DL (ref 70–99)
HCT VFR BLD AUTO: 32.2 % (ref 39–53)
HGB BLD-MCNC: 9.8 G/DL (ref 13–17.5)
IMM GRANULOCYTES # BLD AUTO: 0.02 X10(3) UL (ref 0–1)
IMM GRANULOCYTES NFR BLD: 0.5 %
LIPASE SERPL-CCNC: 174 U/L (ref 73–393)
LYMPHOCYTES # BLD AUTO: 0.84 X10(3) UL (ref 1–4)
LYMPHOCYTES NFR BLD AUTO: 21.6 %
M PROTEIN MFR SERPL ELPH: 7.6 G/DL (ref 6.4–8.2)
MCH RBC QN AUTO: 20.4 PG (ref 26–34)
MCHC RBC AUTO-ENTMCNC: 30.4 G/DL (ref 31–37)
MCV RBC AUTO: 66.9 FL (ref 80–100)
MONOCYTES # BLD AUTO: 0.43 X10(3) UL (ref 0.1–1)
MONOCYTES NFR BLD AUTO: 11.1 %
NEUTROPHILS # BLD AUTO: 2.57 X10 (3) UL (ref 1.5–7.7)
NEUTROPHILS # BLD AUTO: 2.57 X10(3) UL (ref 1.5–7.7)
NEUTROPHILS NFR BLD AUTO: 66 %
OSMOLALITY SERPL CALC.SUM OF ELEC: 299 MOSM/KG (ref 275–295)
PLATELET # BLD AUTO: 130 10(3)UL (ref 150–450)
POTASSIUM SERPL-SCNC: 5.8 MMOL/L (ref 3.5–5.1)
RBC # BLD AUTO: 4.81 X10(6)UL (ref 3.8–5.8)
SODIUM SERPL-SCNC: 138 MMOL/L (ref 136–145)
TROPONIN I SERPL-MCNC: 0.08 NG/ML (ref ?–0.04)
WBC # BLD AUTO: 3.9 X10(3) UL (ref 4–11)

## 2020-02-17 PROCEDURE — 71045 X-RAY EXAM CHEST 1 VIEW: CPT | Performed by: EMERGENCY MEDICINE

## 2020-02-17 PROCEDURE — 99220 INITIAL OBSERVATION CARE,LEVL III: CPT | Performed by: INTERNAL MEDICINE

## 2020-02-17 RX ORDER — DEXTROSE MONOHYDRATE 25 G/50ML
50 INJECTION, SOLUTION INTRAVENOUS
Status: DISCONTINUED | OUTPATIENT
Start: 2020-02-17 | End: 2020-02-18

## 2020-02-17 NOTE — TELEPHONE ENCOUNTER
Shirin Mendiola returned call, she states pt has had symptoms for 3-4  Days, no known injury. She states the pain is in the lower back and \"left shoulder\", unable to give much more information.  She states pt is reporting pain on and off anywhere from 2 to 6/10

## 2020-02-17 NOTE — TELEPHONE ENCOUNTER
Pt has been experiencing pain in back & shoulder on the L side. Please call daughter in law back to discuss further.

## 2020-02-18 ENCOUNTER — APPOINTMENT (OUTPATIENT)
Dept: CV DIAGNOSTICS | Facility: HOSPITAL | Age: 80
End: 2020-02-18
Attending: INTERNAL MEDICINE
Payer: MEDICAID

## 2020-02-18 VITALS
WEIGHT: 170 LBS | TEMPERATURE: 98 F | RESPIRATION RATE: 18 BRPM | HEART RATE: 58 BPM | DIASTOLIC BLOOD PRESSURE: 75 MMHG | HEIGHT: 67 IN | SYSTOLIC BLOOD PRESSURE: 145 MMHG | BODY MASS INDEX: 26.68 KG/M2 | OXYGEN SATURATION: 96 %

## 2020-02-18 PROBLEM — M54.6 ACUTE LEFT-SIDED THORACIC BACK PAIN: Status: ACTIVE | Noted: 2020-02-18

## 2020-02-18 PROBLEM — I25.10 CORONARY ARTERY DISEASE INVOLVING NATIVE CORONARY ARTERY OF NATIVE HEART: Status: ACTIVE | Noted: 2020-02-17

## 2020-02-18 PROBLEM — Z95.5 PRESENCE OF DRUG-ELUTING STENT IN LEFT CIRCUMFLEX CORONARY ARTERY: Status: ACTIVE | Noted: 2020-02-18

## 2020-02-18 LAB
ANION GAP SERPL CALC-SCNC: 6 MMOL/L (ref 0–18)
ATRIAL RATE: 59 BPM
BASOPHILS # BLD AUTO: 0.01 X10(3) UL (ref 0–0.2)
BASOPHILS NFR BLD AUTO: 0.2 %
BUN BLD-MCNC: 20 MG/DL (ref 7–18)
BUN/CREAT SERPL: 14.5 (ref 10–20)
CALCIUM BLD-MCNC: 9.7 MG/DL (ref 8.5–10.1)
CHLORIDE SERPL-SCNC: 108 MMOL/L (ref 98–112)
CO2 SERPL-SCNC: 26 MMOL/L (ref 21–32)
CREAT BLD-MCNC: 1.38 MG/DL (ref 0.7–1.3)
DEPRECATED RDW RBC AUTO: 44.8 FL (ref 35.1–46.3)
EOSINOPHIL # BLD AUTO: 0.02 X10(3) UL (ref 0–0.7)
EOSINOPHIL NFR BLD AUTO: 0.5 %
ERYTHROCYTE [DISTWIDTH] IN BLOOD BY AUTOMATED COUNT: 20.2 % (ref 11–15)
GLUCOSE BLD-MCNC: 140 MG/DL (ref 70–99)
GLUCOSE BLD-MCNC: 168 MG/DL (ref 70–99)
GLUCOSE BLD-MCNC: 192 MG/DL (ref 70–99)
GLUCOSE BLD-MCNC: 193 MG/DL (ref 70–99)
HCT VFR BLD AUTO: 33 % (ref 39–53)
HGB BLD-MCNC: 10.3 G/DL (ref 13–17.5)
IMM GRANULOCYTES # BLD AUTO: 0.02 X10(3) UL (ref 0–1)
IMM GRANULOCYTES NFR BLD: 0.5 %
LYMPHOCYTES # BLD AUTO: 0.8 X10(3) UL (ref 1–4)
LYMPHOCYTES NFR BLD AUTO: 19.3 %
MCH RBC QN AUTO: 20.6 PG (ref 26–34)
MCHC RBC AUTO-ENTMCNC: 31.2 G/DL (ref 31–37)
MCV RBC AUTO: 66 FL (ref 80–100)
MONOCYTES # BLD AUTO: 0.59 X10(3) UL (ref 0.1–1)
MONOCYTES NFR BLD AUTO: 14.3 %
NEUTROPHILS # BLD AUTO: 2.7 X10 (3) UL (ref 1.5–7.7)
NEUTROPHILS # BLD AUTO: 2.7 X10(3) UL (ref 1.5–7.7)
NEUTROPHILS NFR BLD AUTO: 65.2 %
NT-PROBNP SERPL-MCNC: 258 PG/ML (ref ?–450)
OSMOLALITY SERPL CALC.SUM OF ELEC: 295 MOSM/KG (ref 275–295)
P AXIS: 36 DEGREES
P-R INTERVAL: 180 MS
PLATELET # BLD AUTO: 129 10(3)UL (ref 150–450)
POTASSIUM SERPL-SCNC: 4.4 MMOL/L (ref 3.5–5.1)
Q-T INTERVAL: 426 MS
QRS DURATION: 116 MS
QTC CALCULATION (BEZET): 421 MS
R AXIS: -49 DEGREES
RBC # BLD AUTO: 5 X10(6)UL (ref 3.8–5.8)
SODIUM SERPL-SCNC: 140 MMOL/L (ref 136–145)
T AXIS: 38 DEGREES
TROPONIN I SERPL-MCNC: 0.13 NG/ML (ref ?–0.04)
VENTRICULAR RATE: 59 BPM
WBC # BLD AUTO: 4.1 X10(3) UL (ref 4–11)

## 2020-02-18 PROCEDURE — 93306 TTE W/DOPPLER COMPLETE: CPT | Performed by: INTERNAL MEDICINE

## 2020-02-18 PROCEDURE — 99215 OFFICE O/P EST HI 40 MIN: CPT | Performed by: INTERNAL MEDICINE

## 2020-02-18 PROCEDURE — 93017 CV STRESS TEST TRACING ONLY: CPT | Performed by: INTERNAL MEDICINE

## 2020-02-18 PROCEDURE — 78452 HT MUSCLE IMAGE SPECT MULT: CPT | Performed by: INTERNAL MEDICINE

## 2020-02-18 PROCEDURE — 93018 CV STRESS TEST I&R ONLY: CPT | Performed by: INTERNAL MEDICINE

## 2020-02-18 RX ORDER — ACETAMINOPHEN 325 MG/1
325 TABLET ORAL EVERY 6 HOURS PRN
COMMUNITY

## 2020-02-18 RX ORDER — CLOPIDOGREL BISULFATE 75 MG/1
75 TABLET ORAL DAILY
Status: DISCONTINUED | OUTPATIENT
Start: 2020-02-18 | End: 2020-02-18

## 2020-02-18 RX ORDER — HYDRALAZINE HYDROCHLORIDE 25 MG/1
25 TABLET, FILM COATED ORAL 3 TIMES DAILY
Qty: 90 TABLET | Refills: 1 | Status: SHIPPED | OUTPATIENT
Start: 2020-02-18 | End: 2021-05-13 | Stop reason: DRUGHIGH

## 2020-02-18 RX ORDER — HYDRALAZINE HYDROCHLORIDE 20 MG/ML
10 INJECTION INTRAMUSCULAR; INTRAVENOUS EVERY 6 HOURS PRN
Status: DISCONTINUED | OUTPATIENT
Start: 2020-02-18 | End: 2020-02-18

## 2020-02-18 RX ORDER — ASPIRIN 81 MG/1
81 TABLET, CHEWABLE ORAL DAILY
Status: DISCONTINUED | OUTPATIENT
Start: 2020-02-18 | End: 2020-02-18

## 2020-02-18 RX ORDER — LOSARTAN POTASSIUM 100 MG/1
100 TABLET ORAL DAILY
Status: DISCONTINUED | OUTPATIENT
Start: 2020-02-18 | End: 2020-02-18

## 2020-02-18 RX ORDER — PANTOPRAZOLE SODIUM 20 MG/1
20 TABLET, DELAYED RELEASE ORAL
Status: DISCONTINUED | OUTPATIENT
Start: 2020-02-18 | End: 2020-02-18

## 2020-02-18 RX ORDER — LOSARTAN POTASSIUM 100 MG/1
100 TABLET ORAL
COMMUNITY
Start: 2020-02-15 | End: 2021-05-13 | Stop reason: DRUGHIGH

## 2020-02-18 RX ORDER — ATORVASTATIN CALCIUM 40 MG/1
40 TABLET, FILM COATED ORAL NIGHTLY
Status: DISCONTINUED | OUTPATIENT
Start: 2020-02-18 | End: 2020-02-18

## 2020-02-18 RX ORDER — AMIODARONE HYDROCHLORIDE 200 MG/1
200 TABLET ORAL DAILY
COMMUNITY

## 2020-02-18 RX ORDER — HYDRALAZINE HYDROCHLORIDE 10 MG/1
10 TABLET, FILM COATED ORAL 3 TIMES DAILY
Status: DISCONTINUED | OUTPATIENT
Start: 2020-02-18 | End: 2020-02-18

## 2020-02-18 RX ORDER — FUROSEMIDE 40 MG/1
40 TABLET ORAL EVERY OTHER DAY
Status: DISCONTINUED | OUTPATIENT
Start: 2020-02-18 | End: 2020-02-18

## 2020-02-18 RX ORDER — HYDRALAZINE HYDROCHLORIDE 25 MG/1
25 TABLET, FILM COATED ORAL 3 TIMES DAILY
Status: DISCONTINUED | OUTPATIENT
Start: 2020-02-18 | End: 2020-02-18

## 2020-02-18 RX ORDER — FUROSEMIDE 10 MG/ML
40 INJECTION INTRAMUSCULAR; INTRAVENOUS ONCE
Status: COMPLETED | OUTPATIENT
Start: 2020-02-18 | End: 2020-02-18

## 2020-02-18 RX ORDER — AMIODARONE HYDROCHLORIDE 200 MG/1
200 TABLET ORAL DAILY
Status: DISCONTINUED | OUTPATIENT
Start: 2020-02-18 | End: 2020-02-18

## 2020-02-18 RX ORDER — AMLODIPINE BESYLATE 5 MG/1
5 TABLET ORAL DAILY
Status: DISCONTINUED | OUTPATIENT
Start: 2020-02-18 | End: 2020-02-18

## 2020-02-18 NOTE — ED NOTES
Report called at this time to [facility] [id number] for room [phone]. Awaiting inpatient room to be cleaned.

## 2020-02-18 NOTE — PLAN OF CARE
Assumed pt care at 2300. A&Ox4, primarily [place] [facility] speaking, wife at bedside. RA, LAIRD, lung sounds diminished. VSS, SB with BBB on tele. Up ad janae. +2 pitting edema to BLE. Pt voids and is continent.  POC is for cards to see, monitor troponin, POC updated wit schedule  Outcome: Progressing     Problem: DISCHARGE PLANNING  Goal: Discharge to home or other facility with appropriate resources  Description  INTERVENTIONS:  - Identify barriers to discharge w/pt and caregiver  - Include patient/family/discharge partn

## 2020-02-18 NOTE — ED PROVIDER NOTES
Patient Seen in: [facility] Emergency Department      History   Patient presents with:  Back Pain    Stated Complaint: back pain started 4 days ago    HPI    61-year-old [place] male with a history of coronary artery disease and multiple stents HPI.  Constitutional and vital signs reviewed. All other systems reviewed and negative except as noted above.     Physical Exam     ED Triage Vitals [02/17/20 1856]   BP (!) 183/75   Pulse 67   Resp 18   Temp 97.5 °F (36.4 °C)   Temp src Oral   SpO2 99 Absolute 0.84 (*)     All other components within normal limits   LIPASE - Normal   D-DIMER - Normal   CBC WITH DIFFERENTIAL WITH PLATELET    Narrative: The following orders were created for panel order CBC WITH DIFFERENTIAL WITH PLATELET.   Procedure pectoris, unspecified vessel or lesion type ([facility] [address]  Anemia, unspecified type  Type 2 diabetes mellitus with microalbuminuria, without long-term current use of insulin ([facility] [address]  Essential hypertension    Disposition:  Admit  2/17/2020  8:49 pm    Follow-up:  No

## 2020-02-18 NOTE — PROGRESS NOTES
CARDIODIAGNOSTIC PRELIMINARY REPORT:      With the assistance of [facility]  Astrid, #[id number], Lexiscan injection was  Completed with no new EKg changes noted; no arrhythmias     Denied cardiac symptoms    Base:  142/84, HR: 75;  Peak: 146/80, HR: 81

## 2020-02-18 NOTE — PROGRESS NOTES
02/17/20 2307 02/17/20 2309 02/17/20 2311   Vital Signs   BP (!) 199/76 (!) 204/68 (!) 174/83   BP Location Right arm Right arm Right arm   BP Method Automatic Automatic Automatic   Patient Position Lying Sitting Standing   ortho positive, asymptomatic

## 2020-02-18 NOTE — PLAN OF CARE
Nuclear stress test negative for reversible ischemia demonstrating small, fixed apical defect w/ EF ~63%. Reviewed with celine Mark for home cardiac perspective.     KAVITHA Goldberg

## 2020-02-18 NOTE — H&P
[facility]  History and Physical     Verner Bruin Patient Status:  Emergency    1940 MRN [id number]   [address] Attending No att. providers found   [facility] Day # 0 PCP Tonia Turner MD     Chief Com used smokeless tobacco. He reports that he does not drink alcohol or use drugs.     Family History:   Family History   Problem Relation Age of Onset   • Hypertension Mother    • Cancer Brother        Allergies: No Known Allergies    Medications:  No current Use to check glucose twice daily as directed, Disp: 1 Box, Rfl: 5  MICROLET LANCETS Does not apply Misc, USE ONE LANCET BY FINGER TWICE DAILY. USE AS DIRECTED, Disp: 100 each, Rfl: 6  Losartan Potassium 50 MG Oral Tab, Take 100 mg by mouth daily.   , Disp: cyanosis. Integument: No rashes or lesions. Psychiatric: Appropriate mood and affect.       Diagnostic Data:      Labs:  Recent Labs   Lab 02/17/20 1921   WBC 3.9*   HGB 9.8*   MCV 66.9*   .0*       Recent Labs   Lab 02/17/20 1921   *   B

## 2020-02-18 NOTE — PROGRESS NOTES
02/18/20 0530 02/18/20 0534 02/18/20 0537   Vital Signs   BP (!) 180/71 145/67 106/60   BP Location Right arm Right arm Right arm   BP Method Automatic Automatic Automatic   Patient Position Lying Sitting Standing   orthostatic bp +, recheck with elvira

## 2020-02-18 NOTE — PLAN OF CARE
Patient aox3, vss,ra,lungs clear, diminished at the bases, NSR, positive bowel sound, no edema. 2d echo and Lexiscan stress test completed. Troponin . 084, .126. Test results pending discharge. Stress test negative, ok to dc home per cards.  Family notifie

## 2020-02-18 NOTE — PROGRESS NOTES
[facility]  Progress Note: revisit    Rue Form     Subjective:  No chest pain or shortness of breath. I spoke to him using [employer] in his native [address].   His daughter was on the phone and his granddaughter and wife as high as 204/68 late last evening. Pulse: 68   Resp:  18 and unlabored with an O2 sat on room air of 100%. Temp:  Afebrile     General: Alert and oriented in no apparent distress. HEENT:  No JVD, carotids  no bruits. Mouth moist. No thryomegaly.  Ani AC  rivaroxaban (XARELTO) tab 15 mg, 15 mg, Oral, Daily with food  hydrALAzine HCl (APRESOLINE) injection 10 mg, 10 mg, Intravenous, Q6H PRN  lidocaine-menthol 4-1 % 1 patch, 1 patch, Transdermal, Daily  Insulin Aspart Pen (NOVOLOG) 100 UNIT/ML flexpen 2-1

## 2020-02-18 NOTE — PROGRESS NOTES
[facility]  Progress Note     Daniel Madsen Patient Status:  Observation    1940 MRN [id number]   [facility] 2NE-A Attending Silverio Vargas MD   Hosp Day # 0 PCP Tyson Marion MD     Chief Complaint: back pain    S: Gaby Steel mg Oral Daily   • amLODIPine Besylate  5 mg Oral Daily   • aspirin  81 mg Oral Daily   • atorvastatin  40 mg Oral Nightly   • furosemide  40 mg Oral QOD   • hydrALAzine HCl  10 mg Oral TID   • losartan Potassium  100 mg Oral Daily   • Pantoprazole Sodium

## 2020-02-18 NOTE — CONSULTS
Cardiology Consultation    Mihai Prince Patient Status:  Observation    1940 MRN [id number]   [facility] 2NE-A Attending Gisela Esparza MD   Hosp Day # 0 PCP Saadia Boggs MD     Reason for Consultation:  Left arm and shoulder Oral, Daily  •  aspirin chewable tab 81 mg, 81 mg, Oral, Daily  •  atorvastatin (LIPITOR) tab 40 mg, 40 mg, Oral, Nightly  •  furosemide (LASIX) tab 40 mg, 40 mg, Oral, QOD  •  hydrALAzine HCl (APRESOLINE) tab 10 mg, 10 mg, Oral, TID  •  losartan (COZAAR) S3.  Lungs: Clear without wheezes, rales, rhonchi or dullness. Normal excursions and effort. Abdomen: Soft, non-tender. BS-present. Extremities: Without clubbing, cyanosis or edema. Peripheral pulses are 2+.   Neurologic: Alert and oriented, normal affe

## 2020-02-19 ENCOUNTER — TELEPHONE (OUTPATIENT)
Dept: CASE MANAGEMENT | Age: 80
End: 2020-02-19

## 2020-02-19 ENCOUNTER — PATIENT OUTREACH (OUTPATIENT)
Dept: CASE MANAGEMENT | Age: 80
End: 2020-02-19

## 2020-02-19 DIAGNOSIS — M54.6 ACUTE LEFT-SIDED THORACIC BACK PAIN: ICD-10-CM

## 2020-02-19 PROCEDURE — 1111F DSCHRG MED/CURRENT MED MERGE: CPT

## 2020-02-19 NOTE — TELEPHONE ENCOUNTER
Spoke to patient, wife and daughter in law Hawkins in room prior to discharge. Shruthi stated she will call us to schedule when she is able to bring patient to [facility] appt. Left [facility] flyer with patient.

## 2020-02-19 NOTE — PROGRESS NOTES
ERIKA LM requesting a call back with a condition update. ERIKA also requested patient call [facility] clinic at [phone] to schedule a HFU appointment.

## 2020-02-20 ENCOUNTER — TELEPHONE (OUTPATIENT)
Dept: FAMILY MEDICINE CLINIC | Facility: CLINIC | Age: 80
End: 2020-02-20

## 2020-02-20 NOTE — TELEPHONE ENCOUNTER
Spoke to pt's DIL per HIPAA for HFU (non-TCM) today. Pt does not have HFU appt scheduled at this time. Unable to book appt for pt as DIL states she can only bring pt in for Saturday appts.     BOOK BY DATE:  3/3/2020    TRIAGE:  Please f/u with DIL and tr

## 2020-02-20 NOTE — PROGRESS NOTES
Initial Post Discharge Follow Up   Discharge Date: 2/18/20  Contact Date: 2/19/2020    Consent Verification:  Assessment Completed With: Caregiver: daughter-in-law, Arturo Lopez received per patient?  written  HIPAA Verified?   Yes    Discharge D Medications   Medication Sig Dispense Refill   • amiodarone HCl 200 MG Oral Tab Take 200 mg by mouth daily. • losartan 100 MG Oral Tab Take 100 mg by mouth once daily.      • metFORMIN HCl 500 MG Oral Tab Take 500 mg by mouth 2 (two) times daily with me home, are there any needs or concerns you need addressed before your next visit with your PCP?  (DME, meds, disease concerns, Etc): No     Follow up appointments:  NCM attempted to schedule [facility] appt for pt.   DIL refusing despite education on importance and

## 2020-02-21 NOTE — DISCHARGE SUMMARY
[facility]  DISCHARGE SUMMARY     Aggie Pair Patient Status:  Observation    1940 MRN [id number]   [facility] 2NE-A Attending No att. providers found   Hosp Day # 0 PCP Enriqueta Kauffman MD     Date of Admission:  daily. Refills:  0     amLODIPine Besylate 5 MG Tabs  Commonly known as:  NORVASC      Take 5 mg by mouth daily. Refills:  2     atorvastatin 40 MG Tabs  Commonly known as:  LIPITOR      Take 40 mg by mouth nightly.    Refills:  3     Clopidogrel Bisulf acute distress. Respiratory: Clear to auscultation bilaterally. No wheezes. No rhonchi. Cardiovascular: S1, S2. Regular rate and rhythm. No murmurs, rubs or gallops. Abdomen: Soft, nontender, nondistended. Positive bowel sounds.  No rebound or guardin

## 2020-02-28 NOTE — TELEPHONE ENCOUNTER
Reached pt's daughter in law that is on [employer] consent form, she scheduled appointment for pt at her earlier free time, 3/6/20. Task completed.

## 2020-03-02 RX ORDER — GLIPIZIDE 2.5 MG/1
TABLET, EXTENDED RELEASE ORAL
Qty: 30 TABLET | Refills: 0 | Status: SHIPPED | OUTPATIENT
Start: 2020-03-02 | End: 2020-04-10

## 2020-03-02 RX ORDER — LINAGLIPTIN 5 MG/1
TABLET, FILM COATED ORAL
Qty: 30 TABLET | Refills: 0 | Status: SHIPPED | OUTPATIENT
Start: 2020-03-02 | End: 2020-04-16

## 2020-03-03 ENCOUNTER — TELEPHONE (OUTPATIENT)
Dept: CARDIOLOGY | Age: 80
End: 2020-03-03

## 2020-03-03 DIAGNOSIS — I25.10 CAD IN NATIVE ARTERY: Primary | ICD-10-CM

## 2020-03-03 RX ORDER — NITROGLYCERIN 0.4 MG/1
0.4 TABLET SUBLINGUAL EVERY 5 MIN PRN
Qty: 30 TABLET | Refills: 3 | Status: SHIPPED | OUTPATIENT
Start: 2020-03-03

## 2020-03-04 RX ORDER — NITROGLYCERIN 0.4 MG/1
TABLET SUBLINGUAL
Qty: 25 TABLET | Refills: 3 | Status: SHIPPED | OUTPATIENT
Start: 2020-03-04 | End: 2020-07-09 | Stop reason: SDUPTHER

## 2020-03-06 ENCOUNTER — OFFICE VISIT (OUTPATIENT)
Dept: FAMILY MEDICINE CLINIC | Facility: CLINIC | Age: 80
End: 2020-03-06
Payer: MEDICAID

## 2020-03-06 VITALS
DIASTOLIC BLOOD PRESSURE: 75 MMHG | RESPIRATION RATE: 98 BRPM | HEART RATE: 60 BPM | BODY MASS INDEX: 27.15 KG/M2 | WEIGHT: 173 LBS | SYSTOLIC BLOOD PRESSURE: 130 MMHG | HEIGHT: 67 IN | TEMPERATURE: 98 F

## 2020-03-06 DIAGNOSIS — S29.019A THORACIC MYOFASCIAL STRAIN, INITIAL ENCOUNTER: ICD-10-CM

## 2020-03-06 DIAGNOSIS — S29.012A UPPER BACK STRAIN, INITIAL ENCOUNTER: Primary | ICD-10-CM

## 2020-03-06 PROCEDURE — 99214 OFFICE O/P EST MOD 30 MIN: CPT | Performed by: FAMILY MEDICINE

## 2020-03-06 PROCEDURE — 1111F DSCHRG MED/CURRENT MED MERGE: CPT | Performed by: FAMILY MEDICINE

## 2020-03-06 NOTE — PROGRESS NOTES
HPI:   Mayito Dempsey is a 67year old male that presents for hospital f/u. Pt was admitted to ER on 2/17/20 due to LUE pain and history of heart disease. Full cardiac workup was completed and returned negative. Pt stayed one night and was discharged. Tab, Take 100 mg by mouth once daily. , Disp: , Rfl:   •  acetaminophen 325 MG Oral Tab, Take 325 mg by mouth every 6 (six) hours as needed for Pain., Disp: , Rfl:   •  hydrALAzine HCl 25 MG Oral Tab, Take 1 tablet (25 mg total) by mouth 3 (three) times norma dentition. NECK: Supple, no cervical LAD, no thyromegaly. SKIN: No rashes, no skin lesion, no bruising, good turgor. HEART:  Regular rate and rhythm, no murmurs, rubs or gallops. LUNGS: Clear to auscultation bilterally, no rales/rhonchi/wheezing.   ABDO

## 2020-03-26 ENCOUNTER — TELEPHONE (OUTPATIENT)
Dept: FAMILY MEDICINE CLINIC | Facility: CLINIC | Age: 80
End: 2020-03-26

## 2020-03-26 DIAGNOSIS — D64.9 ANEMIA, UNSPECIFIED TYPE: Primary | ICD-10-CM

## 2020-03-26 NOTE — TELEPHONE ENCOUNTER
Ok, GI workup is negative, I would now recommend patient see Hematology regarding anemia to evaluate further. Please refer.

## 2020-03-26 NOTE — TELEPHONE ENCOUNTER
See 3/25/20 TE, partial notes: With the GI work up all negative for GI bleeding as a cause for anemia, the patient now should return to his PCP to look for other causes of anemia other than GI bleeding. His GI testing is all complete.     Thanks  Evelio Perales

## 2020-03-26 NOTE — TELEPHONE ENCOUNTER
Pt calling wanting to speak to a nurse in regards to his condition,and what he should do  next , please call pt

## 2020-03-26 NOTE — TELEPHONE ENCOUNTER
Attempted to reach pt, \"Voicemail not set up\". Pt's [employer] consent form shows this phone number as Dc Martinez, pt's Daughter in law. LMOM on pt's son cell requesting Dc Martinez or pt return call.

## 2020-03-30 NOTE — TELEPHONE ENCOUNTER
Lizbet''s cell same message: \"voicemail not set up\" and her home number states, \"cannot be completed as dialed\". Unable to connect with pt's son number either. Do not see in pt's chart that pt viewed results or any read messages.

## 2020-04-02 NOTE — TELEPHONE ENCOUNTER
Front Staff:  Pt is not returning calls and his son and daughter in law are not returning calls. Pt has not read the e-mail sent via [address] Letter sent via [address] Referral entered. Please print the letter dated 4/2/20 and mail to pt's home.

## 2020-04-10 DIAGNOSIS — Z95.5 S/P PRIMARY ANGIOPLASTY WITH CORONARY STENT: Primary | ICD-10-CM

## 2020-04-10 RX ORDER — LOSARTAN POTASSIUM 100 MG/1
TABLET ORAL
Qty: 90 TABLET | Refills: 0 | Status: SHIPPED | OUTPATIENT
Start: 2020-04-10 | End: 2020-05-08

## 2020-04-10 RX ORDER — AMIODARONE HYDROCHLORIDE 200 MG/1
TABLET ORAL
Qty: 90 TABLET | Refills: 3 | Status: SHIPPED | OUTPATIENT
Start: 2020-04-10 | End: 2021-05-03

## 2020-04-10 RX ORDER — GLIPIZIDE 2.5 MG/1
TABLET, EXTENDED RELEASE ORAL
Qty: 30 TABLET | Refills: 0 | Status: SHIPPED | OUTPATIENT
Start: 2020-04-10 | End: 2020-07-20

## 2020-04-16 ENCOUNTER — TELEPHONE (OUTPATIENT)
Dept: ENDOCRINOLOGY CLINIC | Facility: CLINIC | Age: 80
End: 2020-04-16

## 2020-04-16 RX ORDER — LINAGLIPTIN 5 MG/1
TABLET, FILM COATED ORAL
Qty: 30 TABLET | Refills: 0 | Status: SHIPPED | OUTPATIENT
Start: 2020-04-16 | End: 2020-06-22

## 2020-04-16 NOTE — TELEPHONE ENCOUNTER
Patient needs tet strips for Contour Next sent to [facility], [address] [address] Sanya Lujan, [phone]

## 2020-04-17 RX ORDER — BLOOD SUGAR DIAGNOSTIC
1 STRIP MISCELLANEOUS 4 TIMES DAILY
Qty: 400 STRIP | Refills: 0 | Status: SHIPPED | OUTPATIENT
Start: 2020-04-17

## 2020-04-17 RX ORDER — LANCETS 33 GAUGE
1 EACH MISCELLANEOUS 4 TIMES DAILY
Qty: 400 EACH | Refills: 0 | Status: SHIPPED | OUTPATIENT
Start: 2020-04-17

## 2020-04-17 RX ORDER — BLOOD-GLUCOSE METER
1 KIT MISCELLANEOUS 4 TIMES DAILY
Qty: 1 KIT | Refills: 0 | Status: SHIPPED | OUTPATIENT
Start: 2020-04-17 | End: 2020-07-29 | Stop reason: ALTCHOICE

## 2020-04-17 RX ORDER — BLOOD-GLUCOSE METER
1 EACH MISCELLANEOUS 4 TIMES DAILY
Qty: 1 KIT | Refills: 0 | Status: SHIPPED | OUTPATIENT
Start: 2020-04-17

## 2020-04-17 NOTE — TELEPHONE ENCOUNTER
PA was needed for onetouch verio strips thry sure scripts [id number].  Waiting for approval.

## 2020-04-21 NOTE — TELEPHONE ENCOUNTER
Received fax that onetouch verio strips are covered 1/21/2020 to 4/21/2021. Called pharmacy to let them know.

## 2020-05-08 RX ORDER — LOSARTAN POTASSIUM 100 MG/1
TABLET ORAL
Qty: 90 TABLET | Refills: 0 | Status: SHIPPED | OUTPATIENT
Start: 2020-05-08 | End: 2020-09-09

## 2020-05-08 NOTE — TELEPHONE ENCOUNTER
LOV-1/6/2020  FOV-none  LAST RX-4/10/2020 60 tabs 0 refill  LAST LABS-12/18/2019 a1c- 9.6
Spine appears normal, range of motion is not limited, no muscle or joint tenderness

## 2020-05-21 ENCOUNTER — OFFICE VISIT (OUTPATIENT)
Dept: CARDIOLOGY | Age: 80
End: 2020-05-21

## 2020-05-21 VITALS — HEART RATE: 63 BPM | DIASTOLIC BLOOD PRESSURE: 71 MMHG | SYSTOLIC BLOOD PRESSURE: 143 MMHG

## 2020-05-21 DIAGNOSIS — R00.1 BRADYCARDIA, UNSPECIFIED: ICD-10-CM

## 2020-05-21 DIAGNOSIS — I25.10 CORONARY ARTERY DISEASE INVOLVING NATIVE CORONARY ARTERY OF NATIVE HEART WITHOUT ANGINA PECTORIS: ICD-10-CM

## 2020-05-21 DIAGNOSIS — I48.0 PAROXYSMAL ATRIAL FIBRILLATION (CMD): Primary | ICD-10-CM

## 2020-05-21 PROCEDURE — 99214 OFFICE O/P EST MOD 30 MIN: CPT | Performed by: INTERNAL MEDICINE

## 2020-06-22 RX ORDER — LINAGLIPTIN 5 MG/1
TABLET, FILM COATED ORAL
Qty: 30 TABLET | Refills: 0 | Status: SHIPPED | OUTPATIENT
Start: 2020-06-22 | End: 2020-07-20

## 2020-06-22 NOTE — TELEPHONE ENCOUNTER
Medication(s) to Refill:   Requested Prescriptions     Pending Prescriptions Disp Refills   • TRADJENTA 5 MG Oral Tab [Pharmacy Med Name: Tradjenta 5 MG Oral Tablet] 30 tablet 0     Sig: Take 1 tablet by mouth once daily           Last Time Medication was

## 2020-07-09 ENCOUNTER — OFFICE VISIT (OUTPATIENT)
Dept: CARDIOLOGY | Age: 80
End: 2020-07-09

## 2020-07-09 VITALS
SYSTOLIC BLOOD PRESSURE: 158 MMHG | BODY MASS INDEX: 28.82 KG/M2 | WEIGHT: 173 LBS | HEIGHT: 65 IN | HEART RATE: 61 BPM | DIASTOLIC BLOOD PRESSURE: 74 MMHG

## 2020-07-09 DIAGNOSIS — E78.00 PURE HYPERCHOLESTEROLEMIA: ICD-10-CM

## 2020-07-09 DIAGNOSIS — I65.23 ASYMPTOMATIC CAROTID ARTERY STENOSIS, BILATERAL: ICD-10-CM

## 2020-07-09 DIAGNOSIS — Z95.5 S/P PRIMARY ANGIOPLASTY WITH CORONARY STENT: ICD-10-CM

## 2020-07-09 DIAGNOSIS — I10 BENIGN ESSENTIAL HTN: ICD-10-CM

## 2020-07-09 DIAGNOSIS — I25.10 CAD IN NATIVE ARTERY: Primary | ICD-10-CM

## 2020-07-09 PROCEDURE — 99214 OFFICE O/P EST MOD 30 MIN: CPT | Performed by: INTERNAL MEDICINE

## 2020-07-09 RX ORDER — HYDRALAZINE HYDROCHLORIDE 25 MG/1
25 TABLET, FILM COATED ORAL 3 TIMES DAILY
COMMUNITY
End: 2020-08-28 | Stop reason: DRUGHIGH

## 2020-07-09 ASSESSMENT — PATIENT HEALTH QUESTIONNAIRE - PHQ9
CLINICAL INTERPRETATION OF PHQ9 SCORE: NO FURTHER SCREENING NEEDED
2. FEELING DOWN, DEPRESSED OR HOPELESS: NOT AT ALL
SUM OF ALL RESPONSES TO PHQ9 QUESTIONS 1 AND 2: 0
CLINICAL INTERPRETATION OF PHQ2 SCORE: NO FURTHER SCREENING NEEDED
1. LITTLE INTEREST OR PLEASURE IN DOING THINGS: NOT AT ALL
SUM OF ALL RESPONSES TO PHQ9 QUESTIONS 1 AND 2: 0

## 2020-07-10 ENCOUNTER — TELEPHONE (OUTPATIENT)
Dept: CARDIOLOGY | Age: 80
End: 2020-07-10

## 2020-07-20 ENCOUNTER — TELEPHONE (OUTPATIENT)
Dept: CARDIOLOGY | Age: 80
End: 2020-07-20

## 2020-07-20 RX ORDER — FUROSEMIDE 40 MG/1
TABLET ORAL
Qty: 48 TABLET | Refills: 3 | Status: SHIPPED | OUTPATIENT
Start: 2020-07-20 | End: 2020-08-31 | Stop reason: DRUGHIGH

## 2020-07-20 RX ORDER — LINAGLIPTIN 5 MG/1
TABLET, FILM COATED ORAL
Qty: 30 TABLET | Refills: 0 | Status: SHIPPED | OUTPATIENT
Start: 2020-07-20 | End: 2020-08-17

## 2020-07-20 RX ORDER — GLIPIZIDE 2.5 MG/1
TABLET, EXTENDED RELEASE ORAL
Qty: 30 TABLET | Refills: 0 | Status: SHIPPED | OUTPATIENT
Start: 2020-07-20 | End: 2020-08-17

## 2020-07-20 NOTE — TELEPHONE ENCOUNTER
Medication(s) to Refill:   Requested Prescriptions     Pending Prescriptions Disp Refills   • GLIPIZIDE ER 2.5 MG Oral Tablet 24 Hr [Pharmacy Med Name: glipiZIDE ER 2.5 MG Oral Tablet Extended Release 24 Hour] 30 tablet 0     Sig: Take 1 tablet by mouth on

## 2020-07-29 ENCOUNTER — VIRTUAL PHONE E/M (OUTPATIENT)
Dept: ENDOCRINOLOGY CLINIC | Facility: CLINIC | Age: 80
End: 2020-07-29
Payer: MEDICAID

## 2020-07-29 DIAGNOSIS — E11.65 TYPE 2 DIABETES MELLITUS WITH HYPERGLYCEMIA, WITHOUT LONG-TERM CURRENT USE OF INSULIN (HCC): Primary | ICD-10-CM

## 2020-07-29 PROCEDURE — 99214 OFFICE O/P EST MOD 30 MIN: CPT | Performed by: NURSE PRACTITIONER

## 2020-07-29 NOTE — PROGRESS NOTES
Due to COVID-19 ACTION PLAN, the patient's office visit was converted to a phone visit. Virtual Telephone Check-In     Wilber Becerra verbally consents to a Virtual/Telephone Check-In visit on 07/29/20      Patient understands and accepts financial re am   Metformin 500mg twice daily   tradjenta 5mg once daily     HGBA1C:    Lab Results   Component Value Date    A1C 9.6 (H) 12/18/2019    A1C 7.1 (A) 10/22/2019    A1C 7.2 (A) 02/09/2019     (H) 12/18/2019     Lab Results   Component Value Date capsule 11   • Clopidogrel Bisulfate 75 MG Oral Tab Take 75 mg by mouth daily. • rivaroxaban (XARELTO) 15 MG Oral Tab Take 15 mg by mouth daily with food. • AmLODIPine Besylate 5 MG Oral Tab Take 5 mg by mouth daily.     2   • furosemide 40 MG Oral unexpected weight change. Respiratory: Negative for cough and shortness of breath. Cardiovascular: Negative for chest pain, palpitations and leg swelling. Gastrointestinal: Negative for nausea, diarrhea and constipation.    Musculoskeletal: Glenda Cables Random Urine          Standing Status: Future          Standing Expiration Date: 7/29/2021      DM Quality Indicators:  A1C as reported above  Retinopathy Screen: overdue- Dr Olvera Current  Nephropathy Screen: elevated-      Continue ACEi  Depression Sinae

## 2020-07-29 NOTE — PATIENT INSTRUCTIONS
The blood sugar trends appear to be improved. Continue:     Glipizide 2.5mg once daily in the morning.    Metformin 500mg twice daily   tradjenta 5mg once daily

## 2020-08-17 ENCOUNTER — TELEPHONE (OUTPATIENT)
Dept: CARDIOLOGY | Age: 80
End: 2020-08-17

## 2020-08-17 RX ORDER — LINAGLIPTIN 5 MG/1
TABLET, FILM COATED ORAL
Qty: 30 TABLET | Refills: 0 | Status: SHIPPED | OUTPATIENT
Start: 2020-08-17 | End: 2020-09-02 | Stop reason: ALTCHOICE

## 2020-08-17 RX ORDER — GLIPIZIDE 2.5 MG/1
TABLET, EXTENDED RELEASE ORAL
Qty: 30 TABLET | Refills: 0 | Status: SHIPPED | OUTPATIENT
Start: 2020-08-17 | End: 2020-09-24

## 2020-08-19 ENCOUNTER — TELEPHONE (OUTPATIENT)
Dept: CARDIOLOGY | Age: 80
End: 2020-08-19

## 2020-08-28 ENCOUNTER — APPOINTMENT (OUTPATIENT)
Dept: LAB | Facility: HOSPITAL | Age: 80
End: 2020-08-28
Attending: INTERNAL MEDICINE
Payer: MEDICAID

## 2020-08-28 ENCOUNTER — HOSPITAL ENCOUNTER (OUTPATIENT)
Dept: LAB | Facility: HOSPITAL | Age: 80
Discharge: HOME | End: 2020-08-28
Attending: INTERNAL MEDICINE
Payer: MEDICAID

## 2020-08-28 ENCOUNTER — TELEPHONE (OUTPATIENT)
Dept: ENDOCRINOLOGY CLINIC | Facility: CLINIC | Age: 80
End: 2020-08-28

## 2020-08-28 ENCOUNTER — OFFICE VISIT (OUTPATIENT)
Dept: CARDIOLOGY | Age: 80
End: 2020-08-28

## 2020-08-28 VITALS
WEIGHT: 171 LBS | HEIGHT: 65 IN | HEART RATE: 60 BPM | DIASTOLIC BLOOD PRESSURE: 70 MMHG | BODY MASS INDEX: 28.49 KG/M2 | SYSTOLIC BLOOD PRESSURE: 130 MMHG

## 2020-08-28 DIAGNOSIS — I25.10 CAD IN NATIVE ARTERY: ICD-10-CM

## 2020-08-28 DIAGNOSIS — Z79.899 LONG TERM CURRENT USE OF AMIODARONE: ICD-10-CM

## 2020-08-28 DIAGNOSIS — I65.23 ASYMPTOMATIC CAROTID ARTERY STENOSIS, BILATERAL: ICD-10-CM

## 2020-08-28 DIAGNOSIS — E78.00 PURE HYPERCHOLESTEROLEMIA: ICD-10-CM

## 2020-08-28 DIAGNOSIS — E11.65 TYPE 2 DIABETES MELLITUS WITH HYPERGLYCEMIA, WITHOUT LONG-TERM CURRENT USE OF INSULIN (HCC): ICD-10-CM

## 2020-08-28 DIAGNOSIS — I48.0 PAROXYSMAL ATRIAL FIBRILLATION (CMD): Primary | ICD-10-CM

## 2020-08-28 DIAGNOSIS — I10 BENIGN ESSENTIAL HTN: ICD-10-CM

## 2020-08-28 LAB
ALBUMIN SERPL-MCNC: 3.9 G/DL (ref 3.4–5)
ALBUMIN/GLOB SERPL: 1.1 {RATIO} (ref 1–2)
ALP LIVER SERPL-CCNC: 79 U/L (ref 45–117)
ALT SERPL-CCNC: 49 U/L (ref 16–61)
ANION GAP SERPL CALC-SCNC: 6 MMOL/L (ref 0–18)
AST SERPL-CCNC: 38 U/L (ref 15–37)
BILIRUB SERPL-MCNC: 0.9 MG/DL (ref 0.1–2)
BUN BLD-MCNC: 30 MG/DL (ref 7–18)
BUN/CREAT SERPL: 17.6 (ref 10–20)
CALCIUM BLD-MCNC: 8.9 MG/DL (ref 8.5–10.1)
CHLORIDE SERPL-SCNC: 107 MMOL/L (ref 98–112)
CHOLEST SMN-MCNC: 118 MG/DL (ref ?–200)
CO2 SERPL-SCNC: 20 MMOL/L (ref 21–32)
CREAT BLD-MCNC: 1.7 MG/DL (ref 0.7–1.3)
EST. AVERAGE GLUCOSE BLD GHB EST-MCNC: 148 MG/DL (ref 68–126)
GLOBULIN PLAS-MCNC: 3.4 G/DL (ref 2.8–4.4)
GLUCOSE BLD-MCNC: 102 MG/DL (ref 70–99)
HBA1C MFR BLD HPLC: 6.8 % (ref ?–5.7)
HDLC SERPL-MCNC: 40 MG/DL (ref 40–59)
LDLC SERPL CALC-MCNC: 57 MG/DL (ref ?–100)
M PROTEIN MFR SERPL ELPH: 7.3 G/DL (ref 6.4–8.2)
NONHDLC SERPL-MCNC: 78 MG/DL (ref ?–130)
OSMOLALITY SERPL CALC.SUM OF ELEC: 282 MOSM/KG (ref 275–295)
PATIENT FASTING Y/N/NP: YES
PATIENT FASTING Y/N/NP: YES
POTASSIUM SERPL-SCNC: 4.9 MMOL/L (ref 3.5–5.1)
SODIUM SERPL-SCNC: 133 MMOL/L (ref 136–145)
TRIGL SERPL-MCNC: 107 MG/DL (ref 30–149)
TSI SER-ACNC: 2.45 MIU/ML (ref 0.36–3.74)
VLDLC SERPL CALC-MCNC: 21 MG/DL (ref 0–30)

## 2020-08-28 PROCEDURE — 99214 OFFICE O/P EST MOD 30 MIN: CPT | Performed by: CLINICAL NURSE SPECIALIST

## 2020-08-28 PROCEDURE — 80061 LIPID PANEL: CPT | Performed by: INTERNAL MEDICINE

## 2020-08-28 PROCEDURE — 93000 ELECTROCARDIOGRAM COMPLETE: CPT | Performed by: CLINICAL NURSE SPECIALIST

## 2020-08-28 PROCEDURE — 3075F SYST BP GE 130 - 139MM HG: CPT | Performed by: CLINICAL NURSE SPECIALIST

## 2020-08-28 PROCEDURE — 80053 COMPREHEN METABOLIC PANEL: CPT | Performed by: INTERNAL MEDICINE

## 2020-08-28 PROCEDURE — 36415 COLL VENOUS BLD VENIPUNCTURE: CPT

## 2020-08-28 PROCEDURE — 84443 ASSAY THYROID STIM HORMONE: CPT | Performed by: INTERNAL MEDICINE

## 2020-08-28 PROCEDURE — 83036 HEMOGLOBIN GLYCOSYLATED A1C: CPT

## 2020-08-28 PROCEDURE — 3078F DIAST BP <80 MM HG: CPT | Performed by: CLINICAL NURSE SPECIALIST

## 2020-08-28 SDOH — HEALTH STABILITY: MENTAL HEALTH: HOW OFTEN DO YOU HAVE A DRINK CONTAINING ALCOHOL?: NEVER

## 2020-08-28 SDOH — HEALTH STABILITY: PHYSICAL HEALTH: ON AVERAGE, HOW MANY MINUTES DO YOU ENGAGE IN EXERCISE AT THIS LEVEL?: 0 MIN

## 2020-08-28 SDOH — HEALTH STABILITY: PHYSICAL HEALTH: ON AVERAGE, HOW MANY DAYS PER WEEK DO YOU ENGAGE IN MODERATE TO STRENUOUS EXERCISE (LIKE A BRISK WALK)?: 0 DAYS

## 2020-08-28 ASSESSMENT — PATIENT HEALTH QUESTIONNAIRE - PHQ9
2. FEELING DOWN, DEPRESSED OR HOPELESS: NOT AT ALL
SUM OF ALL RESPONSES TO PHQ9 QUESTIONS 1 AND 2: 0
SUM OF ALL RESPONSES TO PHQ9 QUESTIONS 1 AND 2: 0
CLINICAL INTERPRETATION OF PHQ2 SCORE: NO FURTHER SCREENING NEEDED
1. LITTLE INTEREST OR PLEASURE IN DOING THINGS: NOT AT ALL
CLINICAL INTERPRETATION OF PHQ9 SCORE: NO FURTHER SCREENING NEEDED

## 2020-08-28 ASSESSMENT — ENCOUNTER SYMPTOMS
BRUISES/BLEEDS EASILY: 0
COUGH: 0
WEIGHT GAIN: 0
ALLERGIC/IMMUNOLOGIC COMMENTS: NO NEW FOOD ALLERGIES
WEIGHT LOSS: 0
CHILLS: 0
HEMATOCHEZIA: 0
HEMOPTYSIS: 0
FEVER: 0
SUSPICIOUS LESIONS: 0

## 2020-08-31 ENCOUNTER — TELEPHONE (OUTPATIENT)
Dept: CARDIOLOGY | Age: 80
End: 2020-08-31

## 2020-08-31 DIAGNOSIS — I10 BENIGN ESSENTIAL HTN: ICD-10-CM

## 2020-08-31 DIAGNOSIS — I25.10 CAD IN NATIVE ARTERY: Primary | ICD-10-CM

## 2020-08-31 RX ORDER — FUROSEMIDE 20 MG/1
20 TABLET ORAL EVERY OTHER DAY
Qty: 15 TABLET | Refills: 2 | Status: SHIPPED | OUTPATIENT
Start: 2020-08-31 | End: 2020-12-10

## 2020-09-02 ENCOUNTER — VIRTUAL PHONE E/M (OUTPATIENT)
Dept: ENDOCRINOLOGY CLINIC | Facility: CLINIC | Age: 80
End: 2020-09-02

## 2020-09-02 DIAGNOSIS — E11.65 TYPE 2 DIABETES MELLITUS WITH HYPERGLYCEMIA, WITHOUT LONG-TERM CURRENT USE OF INSULIN (HCC): Primary | ICD-10-CM

## 2020-09-02 PROCEDURE — 99214 OFFICE O/P EST MOD 30 MIN: CPT | Performed by: NURSE PRACTITIONER

## 2020-09-02 NOTE — PATIENT INSTRUCTIONS
A1C 6.8% (this is down from  A1C 9.6% )   Per American Diabetes Association, the  standards of care for older population,  at this time it may be important for us to avoid hypoglycemia and slightly higher blood glucose levels should be accepted eduardo

## 2020-09-02 NOTE — PROGRESS NOTES
Per daughter - unable to complete a video visit   Due to [address], the patient's office visit was converted to a phone visit.   Consented to phone visit 9-2-2020     Time Spent:  16 min          Patient understands and accepts financial responsi Meds : atorvastatin rx.  no side effects   CKD stage III; decrease in furosemide rx from cardiology.  Monitor every 3 m     Previous DM therapies:  metaglrebecca abreu  Tradjenta: insurance formulary  Glimepiride : hypoglycemia     Current DM Regimen:  Glipizide needed for Pain. • hydrALAzine HCl 25 MG Oral Tab Take 1 tablet (25 mg total) by mouth 3 (three) times daily. 90 tablet 1   • omeprazole 20 MG Oral Capsule Delayed Release Take 1 capsule (20 mg total) by mouth daily.  Before meal 30 capsule 11   • Clopi symptoms:   Endocrine: Polyuria, polyphagia, polydipsia: no  Neurological: Paresthesias: yes  HEENT: Blurred vision: no  Skin: no rash or wounds  Hematological: Hypoglycemia: no    Review of Systems   Constitutional: + fatigue .  no unexpected weight change 9/2/2021      Hemoglobin A1C [E]          Standing Status: Future          Standing Expiration Date: 9/2/2021      Microalb/Creat Ratio, Random Urine          Standing Status: Future          Standing Expiration Date: 9/2/2021      DM Quality Indicators:

## 2020-09-09 ENCOUNTER — HOSPITAL ENCOUNTER (OUTPATIENT)
Dept: ULTRASOUND IMAGING | Facility: HOSPITAL | Age: 80
Discharge: HOME | End: 2020-09-09
Attending: INTERNAL MEDICINE
Payer: MEDICAID

## 2020-09-09 ENCOUNTER — LAB ENCOUNTER (OUTPATIENT)
Dept: LAB | Facility: HOSPITAL | Age: 80
End: 2020-09-09
Attending: INTERNAL MEDICINE
Payer: MEDICAID

## 2020-09-09 DIAGNOSIS — K29.70 HELICOBACTER POSITIVE GASTRITIS: ICD-10-CM

## 2020-09-09 DIAGNOSIS — B96.81 HELICOBACTER POSITIVE GASTRITIS: ICD-10-CM

## 2020-09-09 DIAGNOSIS — I65.23 ASYMPTOMATIC CAROTID ARTERY STENOSIS, BILATERAL: ICD-10-CM

## 2020-09-09 PROCEDURE — 87338 HPYLORI STOOL AG IA: CPT

## 2020-09-09 PROCEDURE — 93880 EXTRACRANIAL BILAT STUDY: CPT | Performed by: INTERNAL MEDICINE

## 2020-09-09 RX ORDER — LOSARTAN POTASSIUM 100 MG/1
TABLET ORAL
Qty: 90 TABLET | Refills: 3 | Status: SHIPPED | OUTPATIENT
Start: 2020-09-09 | End: 2020-11-02 | Stop reason: SDUPTHER

## 2020-09-10 ENCOUNTER — E-ADVICE (OUTPATIENT)
Dept: CARDIOLOGY | Age: 80
End: 2020-09-10

## 2020-09-10 ENCOUNTER — TELEPHONE (OUTPATIENT)
Dept: ENDOCRINOLOGY CLINIC | Facility: CLINIC | Age: 80
End: 2020-09-10

## 2020-09-10 LAB — H PYLORI AG STL QL IA: NEGATIVE

## 2020-09-10 NOTE — TELEPHONE ENCOUNTER
Daughter in [address] called reeeduarda BS since stopping Tradjenta 5mg per phone visit on 9/2/2020     9/3/2020  [email or web address]   9/3/2020 10:35pm 230  9/4/2020 fasting 115  9/4/3030 10:25pm 239   9/5/2020 fasting 129   9/5/2020 10pm 215  9/6/2020 fasting 140   9/6

## 2020-09-10 NOTE — TELEPHONE ENCOUNTER
S/w dtr Alvaro Pal who will resume  Liset but she is concerned that his A1C dropped to 6.8%. She will send BG's in 2-3 weeks.

## 2020-09-10 NOTE — PROGRESS NOTES
Here are your results from your recent visit with Gastroenterology. You will be happy to know that your stool test for the H.pylori bacteria returned NEGATIVE. If you need any further assistance, please feel free to call [phone].     Thank y

## 2020-09-10 NOTE — TELEPHONE ENCOUNTER
Has good response to DPP4 class, Tradjenta  Ok to resume Tradjenta 5mg once daily   Ok to take in AM with other DM /heart medications

## 2020-09-21 ENCOUNTER — TELEPHONE (OUTPATIENT)
Dept: CARDIOLOGY | Age: 80
End: 2020-09-21

## 2020-09-21 RX ORDER — HYDRALAZINE HYDROCHLORIDE 10 MG/1
10 TABLET, FILM COATED ORAL 3 TIMES DAILY
COMMUNITY
End: 2020-09-21 | Stop reason: DRUGHIGH

## 2020-09-21 RX ORDER — HYDRALAZINE HYDROCHLORIDE 10 MG/1
20 TABLET, FILM COATED ORAL 3 TIMES DAILY
COMMUNITY
End: 2020-09-21 | Stop reason: SDUPTHER

## 2020-09-21 RX ORDER — HYDRALAZINE HYDROCHLORIDE 10 MG/1
20 TABLET, FILM COATED ORAL 3 TIMES DAILY
Qty: 180 TABLET | Refills: 11 | Status: SHIPPED | OUTPATIENT
Start: 2020-09-21 | End: 2020-11-02 | Stop reason: SDUPTHER

## 2020-09-24 RX ORDER — GLIPIZIDE 2.5 MG/1
TABLET, EXTENDED RELEASE ORAL
Qty: 30 TABLET | Refills: 0 | Status: SHIPPED | OUTPATIENT
Start: 2020-09-24 | End: 2020-11-11

## 2020-10-26 ENCOUNTER — TELEPHONE (OUTPATIENT)
Dept: CARDIOLOGY | Age: 80
End: 2020-10-26

## 2020-10-26 RX ORDER — CLOPIDOGREL BISULFATE 75 MG/1
75 TABLET ORAL DAILY
Qty: 90 TABLET | Refills: 3 | Status: SHIPPED | OUTPATIENT
Start: 2020-10-26 | End: 2020-11-02 | Stop reason: SDUPTHER

## 2020-11-02 ENCOUNTER — TELEPHONE (OUTPATIENT)
Dept: CARDIOLOGY | Age: 80
End: 2020-11-02

## 2020-11-02 RX ORDER — ATORVASTATIN CALCIUM 40 MG/1
40 TABLET, FILM COATED ORAL AT BEDTIME
Qty: 90 TABLET | Refills: 3 | Status: SHIPPED | OUTPATIENT
Start: 2020-11-02 | End: 2022-01-28

## 2020-11-02 RX ORDER — LOSARTAN POTASSIUM 100 MG/1
100 TABLET ORAL DAILY
Qty: 90 TABLET | Refills: 3 | Status: SHIPPED | OUTPATIENT
Start: 2020-11-02 | End: 2021-03-19 | Stop reason: DRUGHIGH

## 2020-11-02 RX ORDER — AMLODIPINE BESYLATE 5 MG/1
5 TABLET ORAL DAILY
Qty: 90 TABLET | Refills: 3 | Status: SHIPPED | OUTPATIENT
Start: 2020-11-02 | End: 2020-11-06 | Stop reason: DRUGHIGH

## 2020-11-02 RX ORDER — CLOPIDOGREL BISULFATE 75 MG/1
75 TABLET ORAL DAILY
Qty: 90 TABLET | Refills: 3 | Status: SHIPPED | OUTPATIENT
Start: 2020-11-02

## 2020-11-02 RX ORDER — HYDRALAZINE HYDROCHLORIDE 10 MG/1
20 TABLET, FILM COATED ORAL 3 TIMES DAILY
Qty: 540 TABLET | Refills: 3 | Status: SHIPPED | OUTPATIENT
Start: 2020-11-02 | End: 2021-05-12 | Stop reason: DRUGHIGH

## 2020-11-06 ENCOUNTER — OFFICE VISIT (OUTPATIENT)
Dept: CARDIOLOGY | Age: 80
End: 2020-11-06

## 2020-11-06 VITALS
WEIGHT: 173 LBS | SYSTOLIC BLOOD PRESSURE: 140 MMHG | DIASTOLIC BLOOD PRESSURE: 60 MMHG | BODY MASS INDEX: 28.79 KG/M2 | HEART RATE: 60 BPM

## 2020-11-06 DIAGNOSIS — I48.0 PAROXYSMAL ATRIAL FIBRILLATION (CMD): Primary | ICD-10-CM

## 2020-11-06 DIAGNOSIS — R00.1 BRADYCARDIA, UNSPECIFIED: ICD-10-CM

## 2020-11-06 PROCEDURE — 3077F SYST BP >= 140 MM HG: CPT | Performed by: INTERNAL MEDICINE

## 2020-11-06 PROCEDURE — 3078F DIAST BP <80 MM HG: CPT | Performed by: INTERNAL MEDICINE

## 2020-11-06 PROCEDURE — 99215 OFFICE O/P EST HI 40 MIN: CPT | Performed by: INTERNAL MEDICINE

## 2020-11-06 RX ORDER — AMLODIPINE BESYLATE 10 MG/1
10 TABLET ORAL DAILY
Qty: 30 TABLET | Refills: 5 | Status: SHIPPED | OUTPATIENT
Start: 2020-11-06 | End: 2021-03-10 | Stop reason: DRUGHIGH

## 2020-11-06 SDOH — HEALTH STABILITY: MENTAL HEALTH: HOW OFTEN DO YOU HAVE A DRINK CONTAINING ALCOHOL?: NEVER

## 2020-11-06 ASSESSMENT — PATIENT HEALTH QUESTIONNAIRE - PHQ9
2. FEELING DOWN, DEPRESSED OR HOPELESS: NOT AT ALL
1. LITTLE INTEREST OR PLEASURE IN DOING THINGS: NOT AT ALL
CLINICAL INTERPRETATION OF PHQ2 SCORE: NO FURTHER SCREENING NEEDED
SUM OF ALL RESPONSES TO PHQ9 QUESTIONS 1 AND 2: 0
CLINICAL INTERPRETATION OF PHQ9 SCORE: NO FURTHER SCREENING NEEDED
SUM OF ALL RESPONSES TO PHQ9 QUESTIONS 1 AND 2: 0

## 2020-11-06 ASSESSMENT — ENCOUNTER SYMPTOMS
BRUISES/BLEEDS EASILY: 0
WEIGHT GAIN: 0
FEVER: 0
COUGH: 0
CHILLS: 0
HEMATOCHEZIA: 0
SUSPICIOUS LESIONS: 0
WEIGHT LOSS: 0
HEMOPTYSIS: 0
ALLERGIC/IMMUNOLOGIC COMMENTS: NO NEW FOOD ALLERGIES

## 2020-11-11 RX ORDER — GLIPIZIDE 2.5 MG/1
TABLET, EXTENDED RELEASE ORAL
Qty: 30 TABLET | Refills: 0 | Status: SHIPPED | OUTPATIENT
Start: 2020-11-11 | End: 2020-12-10

## 2020-11-11 NOTE — TELEPHONE ENCOUNTER
Has been reminded needs labs - but daughter resistant to taking pt for lab due to covid   Cautiously continue metformin (last creatin, egfr 2-2020  Component      Latest Ref Rng & Units 2/18/2020   CREATININE      0.70 - 1.30 mg/dL 1.38 (H)     Component

## 2020-11-12 ENCOUNTER — TELEPHONE (OUTPATIENT)
Dept: CARDIOLOGY | Age: 80
End: 2020-11-12

## 2020-12-10 RX ORDER — FUROSEMIDE 20 MG/1
20 TABLET ORAL EVERY OTHER DAY
Qty: 15 TABLET | Refills: 10 | Status: SHIPPED | OUTPATIENT
Start: 2020-12-10 | End: 2021-05-14 | Stop reason: ALTCHOICE

## 2020-12-10 RX ORDER — GLIPIZIDE 2.5 MG/1
TABLET, EXTENDED RELEASE ORAL
Qty: 30 TABLET | Refills: 0 | Status: SHIPPED | OUTPATIENT
Start: 2020-12-10 | End: 2021-01-04

## 2021-01-04 ENCOUNTER — TELEPHONIC VISIT (OUTPATIENT)
Dept: CARDIOLOGY | Age: 81
End: 2021-01-04

## 2021-01-04 VITALS
DIASTOLIC BLOOD PRESSURE: 75 MMHG | BODY MASS INDEX: 28.96 KG/M2 | WEIGHT: 174 LBS | HEART RATE: 57 BPM | SYSTOLIC BLOOD PRESSURE: 143 MMHG

## 2021-01-04 DIAGNOSIS — R60.1 GENERALIZED EDEMA: ICD-10-CM

## 2021-01-04 DIAGNOSIS — Z79.899 LONG TERM CURRENT USE OF AMIODARONE: ICD-10-CM

## 2021-01-04 DIAGNOSIS — E78.00 PURE HYPERCHOLESTEROLEMIA: ICD-10-CM

## 2021-01-04 DIAGNOSIS — I25.10 CAD IN NATIVE ARTERY: ICD-10-CM

## 2021-01-04 DIAGNOSIS — Z95.5 S/P PRIMARY ANGIOPLASTY WITH CORONARY STENT: Primary | ICD-10-CM

## 2021-01-04 DIAGNOSIS — I65.23 ASYMPTOMATIC CAROTID ARTERY STENOSIS, BILATERAL: ICD-10-CM

## 2021-01-04 DIAGNOSIS — I10 BENIGN ESSENTIAL HTN: ICD-10-CM

## 2021-01-04 PROCEDURE — 99214 OFFICE O/P EST MOD 30 MIN: CPT | Performed by: INTERNAL MEDICINE

## 2021-01-04 RX ORDER — LINAGLIPTIN 5 MG/1
TABLET, FILM COATED ORAL
Qty: 30 TABLET | Refills: 0 | Status: SHIPPED | OUTPATIENT
Start: 2021-01-04 | End: 2021-02-08

## 2021-01-04 RX ORDER — GLIPIZIDE 2.5 MG/1
TABLET, EXTENDED RELEASE ORAL
Qty: 30 TABLET | Refills: 0 | Status: SHIPPED | OUTPATIENT
Start: 2021-01-04 | End: 2021-02-08

## 2021-01-04 ASSESSMENT — ENCOUNTER SYMPTOMS
SUSPICIOUS LESIONS: 0
FEVER: 0
HEMATOCHEZIA: 0
BRUISES/BLEEDS EASILY: 0
COUGH: 0
HEMOPTYSIS: 0
WEIGHT GAIN: 0
WEIGHT LOSS: 0
CHILLS: 0
ALLERGIC/IMMUNOLOGIC COMMENTS: NO NEW FOOD ALLERGIES

## 2021-01-04 ASSESSMENT — PATIENT HEALTH QUESTIONNAIRE - PHQ9
SUM OF ALL RESPONSES TO PHQ9 QUESTIONS 1 AND 2: 0
2. FEELING DOWN, DEPRESSED OR HOPELESS: NOT AT ALL
CLINICAL INTERPRETATION OF PHQ9 SCORE: NO FURTHER SCREENING NEEDED
CLINICAL INTERPRETATION OF PHQ2 SCORE: NO FURTHER SCREENING NEEDED
SUM OF ALL RESPONSES TO PHQ9 QUESTIONS 1 AND 2: 0
1. LITTLE INTEREST OR PLEASURE IN DOING THINGS: NOT AT ALL

## 2021-01-04 NOTE — TELEPHONE ENCOUNTER
Medication(s) to Refill:   Requested Prescriptions     Pending Prescriptions Disp Refills   • METFORMIN  MG Oral Tab [Pharmacy Med Name: metFORMIN HCl 500 MG Oral Tablet] 60 tablet 0     Sig: TAKE 1 TABLET BY MOUTH TWICE DAILY WITH MEALS   • Meme 36

## 2021-01-26 DIAGNOSIS — Z23 NEED FOR VACCINATION: ICD-10-CM

## 2021-02-08 RX ORDER — GLIPIZIDE 2.5 MG/1
TABLET, EXTENDED RELEASE ORAL
Qty: 30 TABLET | Refills: 0 | Status: SHIPPED | OUTPATIENT
Start: 2021-02-08 | End: 2021-03-04

## 2021-02-08 RX ORDER — LINAGLIPTIN 5 MG/1
TABLET, FILM COATED ORAL
Qty: 30 TABLET | Refills: 0 | Status: SHIPPED | OUTPATIENT
Start: 2021-02-08 | End: 2021-03-03

## 2021-02-12 ENCOUNTER — TELEPHONE (OUTPATIENT)
Dept: FAMILY MEDICINE CLINIC | Facility: CLINIC | Age: 81
End: 2021-02-12

## 2021-02-12 NOTE — TELEPHONE ENCOUNTER
Diabetic eye exam report received on 02/12/2021 by Dr. Lamin Shah. No diabetic retinopathy noted. Diabetic flowsheet updated and report placed in Dr. Lamin Shah inbasket to sign and send to scan.

## 2021-02-18 ENCOUNTER — VIRTUAL PHONE E/M (OUTPATIENT)
Dept: ENDOCRINOLOGY CLINIC | Facility: CLINIC | Age: 81
End: 2021-02-18
Payer: MEDICAID

## 2021-02-18 DIAGNOSIS — E11.65 TYPE 2 DIABETES MELLITUS WITH HYPERGLYCEMIA, WITHOUT LONG-TERM CURRENT USE OF INSULIN (HCC): Primary | ICD-10-CM

## 2021-02-18 PROCEDURE — 99213 OFFICE O/P EST LOW 20 MIN: CPT | Performed by: NURSE PRACTITIONER

## 2021-02-18 NOTE — PATIENT INSTRUCTIONS
Your A1C: 6.8%  Update at lab   A1C 8.0- 8.5%     MEDICATIONS:   Continue Metformin 1 tab twice daily   Glipzide XL 2.5mg once daily   Tradjenta 5mg once daily     Blood sugar testing:   Remember to bring your glucose meter or blood sugar logbook to every · Allow 2-3 business days for refills  · Contact your pharmacy at least 5 days prior to running out of medication and have them send an electronic request or submit request through the “request refill” option in your [email or web address] account.   · Refills are not a

## 2021-02-18 NOTE — PROGRESS NOTES
Due to COVID-19 ACTION PLAN, the patient's office visit was converted to a video visit. However daughter requested phone visit due.       Virtual/Telephone Check-In     Ambrocio Verde verbally consents to a Virtual/Telephone Check-In service on    [facility]  Testing 2 x daily   Provided BG log  past 7 days:   Fasting trends 110-133 mg/dl   2 hr post dinner: 217-241mg/d            Diabetes History:  Type 2 DM; ~ 2004    Patient has not had hospitalizations for blood sugar issues  denies any history of panc amiodarone HCl 200 MG Oral Tab Take 200 mg by mouth daily. • losartan 100 MG Oral Tab Take 100 mg by mouth once daily. • acetaminophen 325 MG Oral Tab Take 325 mg by mouth every 6 (six) hours as needed for Pain.      • hydrALAzine HCl 25 MG Oral Tab Smoker      Smokeless tobacco: Never Used    Alcohol use: No    Drug use: No    Family History   Problem Relation Age of Onset   • Hypertension Mother    • Cancer Brother          DM associated review of  symptoms:   Endocrine: Polyuria, polyphagia, polydi chart invite to schedule.  Provided pt daughter w covid scheduling contact if they decide to pursue     The patient is asked to return in 1 6m  but recommended to contact DM clinic sooner if questions or concerns.        The patient indicates understanding APRN

## 2021-03-03 RX ORDER — LINAGLIPTIN 5 MG/1
TABLET, FILM COATED ORAL
Qty: 30 TABLET | Refills: 0 | Status: SHIPPED | OUTPATIENT
Start: 2021-03-03 | End: 2021-04-13

## 2021-03-04 RX ORDER — GLIPIZIDE 2.5 MG/1
2.5 TABLET, EXTENDED RELEASE ORAL
Qty: 90 TABLET | Refills: 0 | Status: SHIPPED | OUTPATIENT
Start: 2021-03-04 | End: 2021-05-13

## 2021-03-10 ENCOUNTER — TELEPHONE (OUTPATIENT)
Dept: CARDIOLOGY | Age: 81
End: 2021-03-10

## 2021-03-10 RX ORDER — AMLODIPINE BESYLATE 5 MG/1
5 TABLET ORAL DAILY
COMMUNITY
End: 2021-03-17 | Stop reason: SDUPTHER

## 2021-03-13 ENCOUNTER — IMMUNIZATION (OUTPATIENT)
Dept: LAB | Facility: HOSPITAL | Age: 81
End: 2021-03-13
Attending: HOSPITALIST
Payer: MEDICAID

## 2021-03-13 ENCOUNTER — LAB ENCOUNTER (OUTPATIENT)
Dept: LAB | Facility: HOSPITAL | Age: 81
End: 2021-03-13
Attending: INTERNAL MEDICINE
Payer: MEDICAID

## 2021-03-13 DIAGNOSIS — Z23 NEED FOR VACCINATION: Primary | ICD-10-CM

## 2021-03-13 DIAGNOSIS — E11.65 TYPE 2 DIABETES MELLITUS WITH HYPERGLYCEMIA, WITHOUT LONG-TERM CURRENT USE OF INSULIN (HCC): ICD-10-CM

## 2021-03-13 LAB
ALBUMIN SERPL-MCNC: 3.8 G/DL (ref 3.4–5)
ALBUMIN/GLOB SERPL: 1.2 {RATIO} (ref 1–2)
ALP LIVER SERPL-CCNC: 88 U/L
ALT SERPL-CCNC: 60 U/L
ANION GAP SERPL CALC-SCNC: 5 MMOL/L (ref 0–18)
AST SERPL-CCNC: 45 U/L (ref 15–37)
BILIRUB SERPL-MCNC: 0.6 MG/DL (ref 0.1–2)
BUN BLD-MCNC: 24 MG/DL (ref 7–18)
BUN/CREAT SERPL: 16.4 (ref 10–20)
CALCIUM BLD-MCNC: 9.2 MG/DL (ref 8.5–10.1)
CHLORIDE SERPL-SCNC: 110 MMOL/L (ref 98–112)
CO2 SERPL-SCNC: 24 MMOL/L (ref 21–32)
CREAT BLD-MCNC: 1.46 MG/DL
CREAT UR-SCNC: 46.3 MG/DL
EST. AVERAGE GLUCOSE BLD GHB EST-MCNC: 169 MG/DL (ref 68–126)
GLOBULIN PLAS-MCNC: 3.3 G/DL (ref 2.8–4.4)
GLUCOSE BLD-MCNC: 116 MG/DL (ref 70–99)
HBA1C MFR BLD HPLC: 7.5 % (ref ?–5.7)
M PROTEIN MFR SERPL ELPH: 7.1 G/DL (ref 6.4–8.2)
MICROALBUMIN UR-MCNC: 18 MG/DL
MICROALBUMIN/CREAT 24H UR-RTO: 388.8 UG/MG (ref ?–30)
OSMOLALITY SERPL CALC.SUM OF ELEC: 293 MOSM/KG (ref 275–295)
PATIENT FASTING Y/N/NP: YES
POTASSIUM SERPL-SCNC: 5.5 MMOL/L (ref 3.5–5.1)
SODIUM SERPL-SCNC: 139 MMOL/L (ref 136–145)

## 2021-03-13 PROCEDURE — 80053 COMPREHEN METABOLIC PANEL: CPT

## 2021-03-13 PROCEDURE — 82570 ASSAY OF URINE CREATININE: CPT

## 2021-03-13 PROCEDURE — 0011A SARSCOV2 VAC 100MCG/0.5ML IM: CPT

## 2021-03-13 PROCEDURE — 36415 COLL VENOUS BLD VENIPUNCTURE: CPT

## 2021-03-13 PROCEDURE — 82043 UR ALBUMIN QUANTITATIVE: CPT

## 2021-03-13 PROCEDURE — 83036 HEMOGLOBIN GLYCOSYLATED A1C: CPT

## 2021-03-16 ENCOUNTER — TELEPHONE (OUTPATIENT)
Dept: CARDIOLOGY | Age: 81
End: 2021-03-16

## 2021-03-16 DIAGNOSIS — I10 BENIGN ESSENTIAL HTN: Primary | ICD-10-CM

## 2021-03-17 ENCOUNTER — LAB ENCOUNTER (OUTPATIENT)
Dept: LAB | Facility: HOSPITAL | Age: 81
End: 2021-03-17
Attending: INTERNAL MEDICINE
Payer: MEDICAID

## 2021-03-17 DIAGNOSIS — I10 ESSENTIAL HYPERTENSION, MALIGNANT: Primary | ICD-10-CM

## 2021-03-17 LAB
ANION GAP SERPL CALC-SCNC: 4 MMOL/L (ref 0–18)
BUN BLD-MCNC: 21 MG/DL (ref 7–18)
BUN/CREAT SERPL: 16 (ref 10–20)
CALCIUM BLD-MCNC: 9.2 MG/DL (ref 8.5–10.1)
CHLORIDE SERPL-SCNC: 107 MMOL/L (ref 98–112)
CO2 SERPL-SCNC: 25 MMOL/L (ref 21–32)
CREAT BLD-MCNC: 1.31 MG/DL
GLUCOSE BLD-MCNC: 156 MG/DL (ref 70–99)
OSMOLALITY SERPL CALC.SUM OF ELEC: 288 MOSM/KG (ref 275–295)
PATIENT FASTING Y/N/NP: YES
POTASSIUM SERPL-SCNC: 5.2 MMOL/L (ref 3.5–5.1)
SODIUM SERPL-SCNC: 136 MMOL/L (ref 136–145)

## 2021-03-17 PROCEDURE — 36415 COLL VENOUS BLD VENIPUNCTURE: CPT

## 2021-03-17 PROCEDURE — 80048 BASIC METABOLIC PNL TOTAL CA: CPT

## 2021-03-17 RX ORDER — AMLODIPINE BESYLATE 5 MG/1
5 TABLET ORAL DAILY
Qty: 90 TABLET | Refills: 1 | Status: SHIPPED | OUTPATIENT
Start: 2021-03-17 | End: 2021-05-14 | Stop reason: DRUGHIGH

## 2021-03-19 ENCOUNTER — OFFICE VISIT (OUTPATIENT)
Dept: CARDIOLOGY | Age: 81
End: 2021-03-19

## 2021-03-19 VITALS
DIASTOLIC BLOOD PRESSURE: 60 MMHG | BODY MASS INDEX: 29.66 KG/M2 | WEIGHT: 178 LBS | HEART RATE: 50 BPM | HEIGHT: 65 IN | SYSTOLIC BLOOD PRESSURE: 118 MMHG

## 2021-03-19 DIAGNOSIS — E87.5 HYPERKALEMIA: ICD-10-CM

## 2021-03-19 DIAGNOSIS — R60.0 BILATERAL LEG EDEMA: Primary | ICD-10-CM

## 2021-03-19 DIAGNOSIS — I10 BENIGN ESSENTIAL HTN: ICD-10-CM

## 2021-03-19 PROCEDURE — 99214 OFFICE O/P EST MOD 30 MIN: CPT | Performed by: CLINICAL NURSE SPECIALIST

## 2021-03-19 PROCEDURE — 3074F SYST BP LT 130 MM HG: CPT | Performed by: CLINICAL NURSE SPECIALIST

## 2021-03-19 PROCEDURE — 3078F DIAST BP <80 MM HG: CPT | Performed by: CLINICAL NURSE SPECIALIST

## 2021-03-19 RX ORDER — LOSARTAN POTASSIUM 100 MG/1
50 TABLET ORAL DAILY
Qty: 90 TABLET | Refills: 3 | Status: SHIPPED | OUTPATIENT
Start: 2021-03-19 | End: 2021-03-20 | Stop reason: DRUGHIGH

## 2021-03-19 SDOH — HEALTH STABILITY: PHYSICAL HEALTH: ON AVERAGE, HOW MANY MINUTES DO YOU ENGAGE IN EXERCISE AT THIS LEVEL?: 0 MIN

## 2021-03-19 SDOH — HEALTH STABILITY: MENTAL HEALTH: HOW OFTEN DO YOU HAVE A DRINK CONTAINING ALCOHOL?: NEVER

## 2021-03-19 SDOH — HEALTH STABILITY: PHYSICAL HEALTH: ON AVERAGE, HOW MANY DAYS PER WEEK DO YOU ENGAGE IN MODERATE TO STRENUOUS EXERCISE (LIKE A BRISK WALK)?: 0 DAYS

## 2021-03-19 ASSESSMENT — ENCOUNTER SYMPTOMS
CHILLS: 0
SYNCOPE: 0
HEADACHES: 0
LIGHT-HEADEDNESS: 0
VOMITING: 0
ALTERED MENTAL STATUS: 0
FEVER: 0
DIAPHORESIS: 0
ABDOMINAL PAIN: 0
BLURRED VISION: 0
ORTHOPNEA: 0
WHEEZING: 0
COUGH: 0
DIZZINESS: 0
SHORTNESS OF BREATH: 0
NAUSEA: 0

## 2021-04-10 ENCOUNTER — IMMUNIZATION (OUTPATIENT)
Dept: LAB | Facility: HOSPITAL | Age: 81
End: 2021-04-10
Attending: EMERGENCY MEDICINE
Payer: MEDICAID

## 2021-04-10 DIAGNOSIS — Z23 NEED FOR VACCINATION: Primary | ICD-10-CM

## 2021-04-10 PROCEDURE — 0012A SARSCOV2 VAC 100MCG/0.5ML IM: CPT

## 2021-04-13 ENCOUNTER — OFFICE VISIT (OUTPATIENT)
Dept: CARDIOLOGY | Age: 81
End: 2021-04-13

## 2021-04-13 VITALS
WEIGHT: 176 LBS | SYSTOLIC BLOOD PRESSURE: 152 MMHG | DIASTOLIC BLOOD PRESSURE: 77 MMHG | BODY MASS INDEX: 29.32 KG/M2 | HEIGHT: 65 IN | HEART RATE: 54 BPM

## 2021-04-13 DIAGNOSIS — I10 BENIGN ESSENTIAL HTN: Primary | ICD-10-CM

## 2021-04-13 PROCEDURE — 99214 OFFICE O/P EST MOD 30 MIN: CPT | Performed by: CLINICAL NURSE SPECIALIST

## 2021-04-13 RX ORDER — LINAGLIPTIN 5 MG/1
5 TABLET, FILM COATED ORAL DAILY
Qty: 30 TABLET | Refills: 3 | Status: SHIPPED | OUTPATIENT
Start: 2021-04-13 | End: 2021-08-16

## 2021-04-30 DIAGNOSIS — Z95.5 S/P PRIMARY ANGIOPLASTY WITH CORONARY STENT: ICD-10-CM

## 2021-05-01 ENCOUNTER — HOSPITAL ENCOUNTER (EMERGENCY)
Facility: HOSPITAL | Age: 81
Discharge: HOME | End: 2021-05-01
Attending: EMERGENCY MEDICINE
Payer: MEDICAID

## 2021-05-01 ENCOUNTER — LAB ENCOUNTER (OUTPATIENT)
Dept: LAB | Facility: HOSPITAL | Age: 81
End: 2021-05-01
Attending: NURSE PRACTITIONER
Payer: MEDICAID

## 2021-05-01 VITALS
BODY MASS INDEX: 26.68 KG/M2 | HEART RATE: 54 BPM | SYSTOLIC BLOOD PRESSURE: 163 MMHG | OXYGEN SATURATION: 98 % | RESPIRATION RATE: 20 BRPM | WEIGHT: 170 LBS | DIASTOLIC BLOOD PRESSURE: 64 MMHG | HEIGHT: 67 IN | TEMPERATURE: 98 F

## 2021-05-01 DIAGNOSIS — E78.5 HYPERLIPEMIA: Primary | ICD-10-CM

## 2021-05-01 DIAGNOSIS — E87.5 HYPERKALEMIA: Primary | ICD-10-CM

## 2021-05-01 LAB
ANION GAP SERPL CALC-SCNC: 5 MMOL/L
BUN SERPL-MCNC: 22 MG/DL
CALCIUM SERPL-MCNC: 9.1 MG/DL
CHLORIDE SERPL-SCNC: 109 MMOL/L
CO2 SERPL-SCNC: 25 MMOL/L
CREAT SERPL-MCNC: 1.24 MG/DL
GLUCOSE SERPL-MCNC: 207 MG/DL
POTASSIUM SERPL-SCNC: 6.1 MMOL/L
SODIUM SERPL-SCNC: 139 MMOL/L

## 2021-05-01 PROCEDURE — 93010 ELECTROCARDIOGRAM REPORT: CPT

## 2021-05-01 PROCEDURE — 99285 EMERGENCY DEPT VISIT HI MDM: CPT

## 2021-05-01 PROCEDURE — 80053 COMPREHEN METABOLIC PANEL: CPT | Performed by: EMERGENCY MEDICINE

## 2021-05-01 PROCEDURE — 96374 THER/PROPH/DIAG INJ IV PUSH: CPT

## 2021-05-01 PROCEDURE — 80048 BASIC METABOLIC PNL TOTAL CA: CPT

## 2021-05-01 PROCEDURE — 99284 EMERGENCY DEPT VISIT MOD MDM: CPT

## 2021-05-01 PROCEDURE — 80053 COMPREHEN METABOLIC PANEL: CPT

## 2021-05-01 PROCEDURE — 80048 BASIC METABOLIC PNL TOTAL CA: CPT | Performed by: EMERGENCY MEDICINE

## 2021-05-01 PROCEDURE — 93005 ELECTROCARDIOGRAM TRACING: CPT

## 2021-05-01 PROCEDURE — 36415 COLL VENOUS BLD VENIPUNCTURE: CPT

## 2021-05-01 PROCEDURE — 85025 COMPLETE CBC W/AUTO DIFF WBC: CPT | Performed by: EMERGENCY MEDICINE

## 2021-05-01 RX ORDER — SODIUM POLYSTYRENE SULFONATE 4.1 MEQ/G
30 POWDER, FOR SUSPENSION ORAL; RECTAL ONCE
Status: COMPLETED | OUTPATIENT
Start: 2021-05-01 | End: 2021-05-01

## 2021-05-01 RX ORDER — HYDRALAZINE HYDROCHLORIDE 20 MG/ML
10 INJECTION INTRAMUSCULAR; INTRAVENOUS ONCE
Status: COMPLETED | OUTPATIENT
Start: 2021-05-01 | End: 2021-05-01

## 2021-05-01 RX ORDER — LOSARTAN POTASSIUM 50 MG/1
50 TABLET ORAL DAILY
Qty: 14 TABLET | Refills: 0 | Status: SHIPPED | OUTPATIENT
Start: 2021-05-01 | End: 2021-05-15

## 2021-05-01 RX ORDER — HYDRALAZINE HYDROCHLORIDE 50 MG/1
50 TABLET, FILM COATED ORAL 3 TIMES DAILY
Qty: 42 TABLET | Refills: 0 | Status: SHIPPED | OUTPATIENT
Start: 2021-05-01 | End: 2021-05-15

## 2021-05-01 NOTE — ED PROVIDER NOTES
Patient Seen in: [facility] Emergency Department      History   Patient presents with:  Abnormal Labs    Stated Complaint: Potassium 6.1 from labs this morning    HPI/Subjective:   HPI    This is a 67-year-old male past medical history of hypertensi Vaping Use: Never used    Alcohol use: No    Drug use: No             Review of Systems    Positive for stated complaint: Potassium 6.1 from labs this morning  Other systems are as noted in HPI. Constitutional and vital signs reviewed.       All other syst -----------         ------                     CBC W/ DIFFERENTIAL[[id number]]          Abnormal            Final result                 Please view results for these tests on the individual orders.    [facility]

## 2021-05-03 ENCOUNTER — TELEPHONE (OUTPATIENT)
Dept: CARDIOLOGY | Age: 81
End: 2021-05-03

## 2021-05-03 ENCOUNTER — CLINICAL ABSTRACT (OUTPATIENT)
Dept: CARDIOLOGY | Age: 81
End: 2021-05-03

## 2021-05-03 ENCOUNTER — LAB ENCOUNTER (OUTPATIENT)
Dept: LAB | Facility: HOSPITAL | Age: 81
End: 2021-05-03
Attending: INTERNAL MEDICINE
Payer: MEDICAID

## 2021-05-03 DIAGNOSIS — I48.0 PAROXYSMAL ATRIAL FIBRILLATION (HCC): Primary | ICD-10-CM

## 2021-05-03 DIAGNOSIS — E87.5 HYPERKALEMIA: Primary | ICD-10-CM

## 2021-05-03 LAB
ALBUMIN SERPL-MCNC: 3.8 G/DL
ALP SERPL-CCNC: 88 U/L
ALT SERPL-CCNC: 62 UNITS/L
AST SERPL-CCNC: 45 UNITS/L
BILIRUB SERPL-MCNC: 0.6 MG/DL
BUN SERPL-MCNC: 23 MG/DL
CALCIUM SERPL-MCNC: 8.8 MG/DL
CHLORIDE SERPL-SCNC: 108 MMOL/L
CREAT SERPL-MCNC: 1.23 MG/DL
GLOBULIN SER-MCNC: 3.3 G/DL
GLUCOSE SERPL-MCNC: 154 MG/DL
HCT VFR BLD CALC: 31.2 %
HGB BLD-MCNC: 9.6 G/DL
MCH RBC QN AUTO: 20.4 PG
MCHC RBC AUTO-ENTMCNC: 30.8 G/DL
MCV RBC AUTO: 66.2 FL
PLATELET # BLD: 141 K/MCL
POTASSIUM SERPL-SCNC: 5.4 MMOL/L
PROT SERPL-MCNC: 7.1 G/DL
RBC # BLD: 4.71 10*6/UL
SODIUM SERPL-SCNC: 138 MMOL/L
TSH SERPL-ACNC: 2.21 MCUNITS/ML
WBC # BLD: 4.9 K/MCL

## 2021-05-03 PROCEDURE — 36415 COLL VENOUS BLD VENIPUNCTURE: CPT

## 2021-05-03 PROCEDURE — 80053 COMPREHEN METABOLIC PANEL: CPT

## 2021-05-03 PROCEDURE — 84443 ASSAY THYROID STIM HORMONE: CPT

## 2021-05-03 PROCEDURE — 85027 COMPLETE CBC AUTOMATED: CPT

## 2021-05-03 RX ORDER — AMIODARONE HYDROCHLORIDE 200 MG/1
TABLET ORAL
Qty: 90 TABLET | Refills: 0 | Status: SHIPPED | OUTPATIENT
Start: 2021-05-03 | End: 2021-08-04

## 2021-05-04 ENCOUNTER — LAB SERVICES (OUTPATIENT)
Dept: CARDIOLOGY | Age: 81
End: 2021-05-04

## 2021-05-12 ENCOUNTER — TELEPHONE (OUTPATIENT)
Dept: CARDIOLOGY | Age: 81
End: 2021-05-12

## 2021-05-12 RX ORDER — HYDRALAZINE HYDROCHLORIDE 50 MG/1
50 TABLET, FILM COATED ORAL 3 TIMES DAILY
Qty: 90 TABLET | Refills: 1 | Status: SHIPPED | OUTPATIENT
Start: 2021-05-12 | End: 2021-05-14 | Stop reason: SDUPTHER

## 2021-05-13 ENCOUNTER — TELEMEDICINE (OUTPATIENT)
Dept: ENDOCRINOLOGY CLINIC | Facility: CLINIC | Age: 81
End: 2021-05-13

## 2021-05-13 DIAGNOSIS — E11.65 TYPE 2 DIABETES MELLITUS WITH HYPERGLYCEMIA, WITHOUT LONG-TERM CURRENT USE OF INSULIN (HCC): Primary | ICD-10-CM

## 2021-05-13 PROBLEM — D64.9 ANEMIA, UNSPECIFIED TYPE: Status: RESOLVED | Noted: 2020-02-17 | Resolved: 2021-05-13

## 2021-05-13 PROCEDURE — 99213 OFFICE O/P EST LOW 20 MIN: CPT | Performed by: NURSE PRACTITIONER

## 2021-05-13 RX ORDER — AMLODIPINE BESYLATE 10 MG/1
10 TABLET ORAL DAILY
Qty: 90 TABLET | Refills: 0 | COMMUNITY
Start: 2021-05-13

## 2021-05-13 RX ORDER — GLIPIZIDE 2.5 MG/1
TABLET, EXTENDED RELEASE ORAL
Qty: 180 TABLET | Refills: 0 | Status: SHIPPED | OUTPATIENT
Start: 2021-05-13 | End: 2021-08-16

## 2021-05-13 RX ORDER — FUROSEMIDE 20 MG/1
TABLET ORAL
Qty: 90 TABLET | Refills: 0 | COMMUNITY
Start: 2021-05-13

## 2021-05-13 NOTE — PATIENT INSTRUCTIONS
After dinner blood sugar trends are too high   We will increase Glipizide to 2.5mg with breakfast AND dinner      Continue:   Metformin 500mg twice daily    Tradjenta 5mg once daily       Call in 1 week to report Blood sugar trends      Blood sugar targets

## 2021-05-13 NOTE — PROGRESS NOTES
Due to COVID-19 ACTION PLAN, the patient's office visit was converted to a phone isit. Please note that the following visit was completed using two-way, real-time interactive audio  Communication.   Daughter states that appt was not for video but as phone no known allergies.      Current Outpatient Medications   Medication Sig Dispense Refill   • glipiZIDE ER 2.5 MG Oral Tablet 24 Hr 1 tab twice daily as directed 180 tablet 0   • furosemide 20 MG Oral Tab 1 tab every other day 90 tablet 0   • amLODIPine Besy Carotid artery stenosis    • Cataract    • Coronary atherosclerosis    • Heart attack ([facility] [address]    • High blood pressure    • High cholesterol    • Other and unspecified hyperlipidemia    • Stroke [facility])    • Type II or unspecified type diabetes mellitus withou time  GLP rx  Continue SMBG 2 x daily    Call in 1 week to report BG trends  Be cautious of hypoglycemia sxs since increase in SPARKS class     Reminded pt on A1C and blood sugar targets (Fasting 95- 140 0 and post prandial <242 ) and complications associated to go to ER or call 911 for worsening symptoms or acute distress.      Marleen Westbrook, APRN

## 2021-05-14 ENCOUNTER — OFFICE VISIT (OUTPATIENT)
Dept: CARDIOLOGY | Age: 81
End: 2021-05-14

## 2021-05-14 VITALS
DIASTOLIC BLOOD PRESSURE: 74 MMHG | WEIGHT: 176 LBS | SYSTOLIC BLOOD PRESSURE: 183 MMHG | HEART RATE: 53 BPM | BODY MASS INDEX: 29.29 KG/M2

## 2021-05-14 DIAGNOSIS — I48.0 PAROXYSMAL ATRIAL FIBRILLATION (CMD): ICD-10-CM

## 2021-05-14 DIAGNOSIS — E87.5 HYPERKALEMIA: Primary | ICD-10-CM

## 2021-05-14 DIAGNOSIS — I10 ESSENTIAL HYPERTENSION: ICD-10-CM

## 2021-05-14 PROCEDURE — 99215 OFFICE O/P EST HI 40 MIN: CPT | Performed by: INTERNAL MEDICINE

## 2021-05-14 PROCEDURE — 3078F DIAST BP <80 MM HG: CPT | Performed by: INTERNAL MEDICINE

## 2021-05-14 PROCEDURE — 3077F SYST BP >= 140 MM HG: CPT | Performed by: INTERNAL MEDICINE

## 2021-05-14 RX ORDER — HYDRALAZINE HYDROCHLORIDE 50 MG/1
75 TABLET, FILM COATED ORAL 3 TIMES DAILY
Qty: 90 TABLET | Refills: 1 | Status: SHIPPED | OUTPATIENT
Start: 2021-05-14 | End: 2021-06-08

## 2021-05-14 RX ORDER — EPLERENONE 25 MG/1
25 TABLET, FILM COATED ORAL DAILY
Qty: 30 TABLET | Refills: 5 | Status: SHIPPED | OUTPATIENT
Start: 2021-05-14 | End: 2021-06-08

## 2021-05-14 RX ORDER — AMLODIPINE BESYLATE 5 MG/1
5 TABLET ORAL DAILY
COMMUNITY
End: 2021-06-08 | Stop reason: DRUGHIGH

## 2021-05-14 ASSESSMENT — PATIENT HEALTH QUESTIONNAIRE - PHQ9
2. FEELING DOWN, DEPRESSED OR HOPELESS: NOT AT ALL
CLINICAL INTERPRETATION OF PHQ9 SCORE: NO FURTHER SCREENING NEEDED
1. LITTLE INTEREST OR PLEASURE IN DOING THINGS: NOT AT ALL
CLINICAL INTERPRETATION OF PHQ2 SCORE: NO FURTHER SCREENING NEEDED
SUM OF ALL RESPONSES TO PHQ9 QUESTIONS 1 AND 2: 0
SUM OF ALL RESPONSES TO PHQ9 QUESTIONS 1 AND 2: 0

## 2021-05-17 ENCOUNTER — TELEPHONE (OUTPATIENT)
Dept: CARDIOLOGY | Age: 81
End: 2021-05-17

## 2021-05-17 RX ORDER — HYDRALAZINE HYDROCHLORIDE 25 MG/1
TABLET, FILM COATED ORAL
Qty: 90 TABLET | Refills: 6 | Status: SHIPPED | OUTPATIENT
Start: 2021-05-17 | End: 2021-05-27 | Stop reason: SDUPTHER

## 2021-05-24 ENCOUNTER — TELEPHONE (OUTPATIENT)
Dept: CARDIOLOGY | Age: 81
End: 2021-05-24

## 2021-05-24 RX ORDER — LOSARTAN POTASSIUM 50 MG/1
50 TABLET ORAL DAILY
Qty: 30 TABLET | Refills: 1 | Status: SHIPPED | OUTPATIENT
Start: 2021-05-24 | End: 2021-06-08

## 2021-05-27 ENCOUNTER — OFFICE VISIT (OUTPATIENT)
Dept: CARDIOLOGY | Age: 81
End: 2021-05-27

## 2021-05-27 ENCOUNTER — LAB ENCOUNTER (OUTPATIENT)
Dept: LAB | Facility: HOSPITAL | Age: 81
End: 2021-05-27
Payer: MEDICAID

## 2021-05-27 VITALS — WEIGHT: 179 LBS | HEART RATE: 71 BPM | HEIGHT: 65 IN | BODY MASS INDEX: 29.82 KG/M2

## 2021-05-27 DIAGNOSIS — I48.91 ATRIAL FIBRILLATION (HCC): ICD-10-CM

## 2021-05-27 DIAGNOSIS — E87.5 HYPERPOTASSEMIA: Primary | ICD-10-CM

## 2021-05-27 DIAGNOSIS — I10 ESSENTIAL HYPERTENSION: Primary | ICD-10-CM

## 2021-05-27 DIAGNOSIS — I10 ESSENTIAL HYPERTENSION, MALIGNANT: ICD-10-CM

## 2021-05-27 DIAGNOSIS — I48.0 PAROXYSMAL ATRIAL FIBRILLATION (CMD): ICD-10-CM

## 2021-05-27 PROCEDURE — 80053 COMPREHEN METABOLIC PANEL: CPT

## 2021-05-27 PROCEDURE — 99215 OFFICE O/P EST HI 40 MIN: CPT | Performed by: INTERNAL MEDICINE

## 2021-05-27 PROCEDURE — 36415 COLL VENOUS BLD VENIPUNCTURE: CPT

## 2021-05-28 ENCOUNTER — APPOINTMENT (OUTPATIENT)
Dept: CARDIOLOGY | Age: 81
End: 2021-05-28

## 2021-06-04 ENCOUNTER — TELEPHONE (OUTPATIENT)
Dept: FAMILY MEDICINE CLINIC | Facility: CLINIC | Age: 81
End: 2021-06-04

## 2021-06-04 ENCOUNTER — TELEPHONE (OUTPATIENT)
Dept: CARDIOLOGY | Age: 81
End: 2021-06-04

## 2021-06-04 DIAGNOSIS — H91.90 HEARING LOSS, UNSPECIFIED HEARING LOSS TYPE, UNSPECIFIED LATERALITY: Primary | ICD-10-CM

## 2021-06-07 ENCOUNTER — TELEPHONE (OUTPATIENT)
Dept: CARDIOLOGY | Age: 81
End: 2021-06-07

## 2021-06-08 ENCOUNTER — OFFICE VISIT (OUTPATIENT)
Dept: CARDIOLOGY | Age: 81
End: 2021-06-08

## 2021-06-08 VITALS
BODY MASS INDEX: 27.94 KG/M2 | DIASTOLIC BLOOD PRESSURE: 58 MMHG | HEART RATE: 62 BPM | SYSTOLIC BLOOD PRESSURE: 162 MMHG | HEIGHT: 67 IN | WEIGHT: 178 LBS

## 2021-06-08 DIAGNOSIS — Z79.899 LONG TERM CURRENT USE OF AMIODARONE: ICD-10-CM

## 2021-06-08 DIAGNOSIS — E78.00 PURE HYPERCHOLESTEROLEMIA: ICD-10-CM

## 2021-06-08 DIAGNOSIS — R60.1 GENERALIZED EDEMA: ICD-10-CM

## 2021-06-08 DIAGNOSIS — I10 BENIGN ESSENTIAL HTN: ICD-10-CM

## 2021-06-08 DIAGNOSIS — R06.02 SHORTNESS OF BREATH: ICD-10-CM

## 2021-06-08 DIAGNOSIS — I25.10 CAD IN NATIVE ARTERY: ICD-10-CM

## 2021-06-08 DIAGNOSIS — Z95.5 S/P PRIMARY ANGIOPLASTY WITH CORONARY STENT: Primary | ICD-10-CM

## 2021-06-08 DIAGNOSIS — I65.23 ASYMPTOMATIC CAROTID ARTERY STENOSIS, BILATERAL: ICD-10-CM

## 2021-06-08 PROCEDURE — 3078F DIAST BP <80 MM HG: CPT | Performed by: INTERNAL MEDICINE

## 2021-06-08 PROCEDURE — 3077F SYST BP >= 140 MM HG: CPT | Performed by: INTERNAL MEDICINE

## 2021-06-08 PROCEDURE — 99215 OFFICE O/P EST HI 40 MIN: CPT | Performed by: INTERNAL MEDICINE

## 2021-06-08 RX ORDER — AMLODIPINE BESYLATE 10 MG/1
10 TABLET ORAL DAILY
Qty: 90 TABLET | Refills: 3 | Status: SHIPPED | OUTPATIENT
Start: 2021-06-08

## 2021-06-08 RX ORDER — FUROSEMIDE 20 MG/1
20 TABLET ORAL EVERY OTHER DAY
COMMUNITY

## 2021-06-08 RX ORDER — HYDRALAZINE HYDROCHLORIDE 25 MG/1
25 TABLET, FILM COATED ORAL 3 TIMES DAILY
COMMUNITY
End: 2021-06-08 | Stop reason: DRUGHIGH

## 2021-06-08 RX ORDER — HYDRALAZINE HYDROCHLORIDE 100 MG/1
TABLET, FILM COATED ORAL
Qty: 180 TABLET | Refills: 3 | Status: SHIPPED | OUTPATIENT
Start: 2021-06-08

## 2021-06-08 RX ORDER — HYDRALAZINE HYDROCHLORIDE 50 MG/1
TABLET, FILM COATED ORAL
Qty: 90 TABLET | Refills: 3 | Status: SHIPPED | OUTPATIENT
Start: 2021-06-08

## 2021-06-08 RX ORDER — HYDRALAZINE HYDROCHLORIDE 50 MG/1
50 TABLET, FILM COATED ORAL 3 TIMES DAILY
COMMUNITY
End: 2021-06-08 | Stop reason: SDUPTHER

## 2021-06-08 ASSESSMENT — PATIENT HEALTH QUESTIONNAIRE - PHQ9
CLINICAL INTERPRETATION OF PHQ2 SCORE: NO FURTHER SCREENING NEEDED
CLINICAL INTERPRETATION OF PHQ9 SCORE: NO FURTHER SCREENING NEEDED
2. FEELING DOWN, DEPRESSED OR HOPELESS: NOT AT ALL
2. FEELING DOWN, DEPRESSED OR HOPELESS: NOT AT ALL
CLINICAL INTERPRETATION OF PHQ2 SCORE: NO FURTHER SCREENING NEEDED
1. LITTLE INTEREST OR PLEASURE IN DOING THINGS: NOT AT ALL
SUM OF ALL RESPONSES TO PHQ9 QUESTIONS 1 AND 2: 0
1. LITTLE INTEREST OR PLEASURE IN DOING THINGS: NOT AT ALL
SUM OF ALL RESPONSES TO PHQ9 QUESTIONS 1 AND 2: 0
SUM OF ALL RESPONSES TO PHQ9 QUESTIONS 1 AND 2: 0

## 2021-06-09 ENCOUNTER — TELEPHONE (OUTPATIENT)
Dept: CARDIOLOGY | Age: 81
End: 2021-06-09

## 2021-06-30 RX ORDER — EPLERENONE 25 MG/1
25 TABLET, FILM COATED ORAL DAILY
Qty: 30 TABLET | Refills: 5 | Status: SHIPPED | OUTPATIENT
Start: 2021-06-30

## 2021-06-30 RX ORDER — EPLERENONE 25 MG/1
25 TABLET, FILM COATED ORAL DAILY
Qty: 30 TABLET | Refills: 5 | Status: SHIPPED | OUTPATIENT
Start: 2021-06-30 | End: 2021-06-30 | Stop reason: SDUPTHER

## 2021-08-04 DIAGNOSIS — Z95.5 S/P PRIMARY ANGIOPLASTY WITH CORONARY STENT: ICD-10-CM

## 2021-08-04 RX ORDER — AMIODARONE HYDROCHLORIDE 200 MG/1
TABLET ORAL
Qty: 90 TABLET | Refills: 0 | Status: SHIPPED | OUTPATIENT
Start: 2021-08-04 | End: 2021-11-12

## 2021-08-06 ENCOUNTER — LAB ENCOUNTER (OUTPATIENT)
Dept: LAB | Facility: HOSPITAL | Age: 81
End: 2021-08-06
Attending: INTERNAL MEDICINE
Payer: MEDICAID

## 2021-08-06 DIAGNOSIS — E78.5 HYPERLIPIDEMIA: ICD-10-CM

## 2021-08-06 DIAGNOSIS — E11.9 DM TYPE 2 (DIABETES MELLITUS, TYPE 2) (HCC): Primary | ICD-10-CM

## 2021-08-06 LAB
ALBUMIN SERPL-MCNC: 4 G/DL (ref 3.4–5)
ALBUMIN/GLOB SERPL: 1.3 {RATIO} (ref 1–2)
ALP LIVER SERPL-CCNC: 100 U/L
ALT SERPL-CCNC: 75 U/L
ANION GAP SERPL CALC-SCNC: 4 MMOL/L (ref 0–18)
AST SERPL-CCNC: 52 U/L (ref 15–37)
BASOPHILS # BLD AUTO: 0.01 X10(3) UL (ref 0–0.2)
BASOPHILS NFR BLD AUTO: 0.2 %
BILIRUB SERPL-MCNC: 0.8 MG/DL (ref 0.1–2)
BUN BLD-MCNC: 24 MG/DL (ref 7–18)
CALCIUM BLD-MCNC: 9.4 MG/DL (ref 8.5–10.1)
CHLORIDE SERPL-SCNC: 111 MMOL/L (ref 98–112)
CHOLEST SMN-MCNC: 134 MG/DL (ref ?–200)
CO2 SERPL-SCNC: 22 MMOL/L (ref 21–32)
CREAT BLD-MCNC: 1.3 MG/DL
EOSINOPHIL # BLD AUTO: 0.02 X10(3) UL (ref 0–0.7)
EOSINOPHIL NFR BLD AUTO: 0.4 %
ERYTHROCYTE [DISTWIDTH] IN BLOOD BY AUTOMATED COUNT: 17.4 %
GLOBULIN PLAS-MCNC: 3.1 G/DL (ref 2.8–4.4)
GLUCOSE BLD-MCNC: 115 MG/DL (ref 70–99)
HAV IGM SER QL: 1.9 MG/DL (ref 1.6–2.6)
HCT VFR BLD AUTO: 31.6 %
HDLC SERPL-MCNC: 40 MG/DL (ref 40–59)
HGB BLD-MCNC: 9.6 G/DL
IMM GRANULOCYTES # BLD AUTO: 0.02 X10(3) UL (ref 0–1)
IMM GRANULOCYTES NFR BLD: 0.4 %
LDLC SERPL CALC-MCNC: 70 MG/DL (ref ?–100)
LYMPHOCYTES # BLD AUTO: 1.72 X10(3) UL (ref 1–4)
LYMPHOCYTES NFR BLD AUTO: 33.5 %
M PROTEIN MFR SERPL ELPH: 7.1 G/DL (ref 6.4–8.2)
MCH RBC QN AUTO: 20.3 PG (ref 26–34)
MCHC RBC AUTO-ENTMCNC: 30.4 G/DL (ref 31–37)
MCV RBC AUTO: 66.8 FL
MONOCYTES # BLD AUTO: 0.68 X10(3) UL (ref 0.1–1)
MONOCYTES NFR BLD AUTO: 13.2 %
NEUTROPHILS # BLD AUTO: 2.69 X10 (3) UL (ref 1.5–7.7)
NEUTROPHILS # BLD AUTO: 2.69 X10(3) UL (ref 1.5–7.7)
NEUTROPHILS NFR BLD AUTO: 52.3 %
NONHDLC SERPL-MCNC: 94 MG/DL (ref ?–130)
NT-PROBNP SERPL-MCNC: 364 PG/ML (ref ?–450)
OSMOLALITY SERPL CALC.SUM OF ELEC: 289 MOSM/KG (ref 275–295)
PATIENT FASTING Y/N/NP: YES
PATIENT FASTING Y/N/NP: YES
PLATELET # BLD AUTO: 138 10(3)UL (ref 150–450)
POTASSIUM SERPL-SCNC: 5.7 MMOL/L (ref 3.5–5.1)
RBC # BLD AUTO: 4.73 X10(6)UL
SODIUM SERPL-SCNC: 137 MMOL/L (ref 136–145)
TRIGL SERPL-MCNC: 137 MG/DL (ref 30–149)
VLDLC SERPL CALC-MCNC: 21 MG/DL (ref 0–30)
WBC # BLD AUTO: 5.1 X10(3) UL (ref 4–11)

## 2021-08-06 PROCEDURE — 83735 ASSAY OF MAGNESIUM: CPT

## 2021-08-06 PROCEDURE — 85025 COMPLETE CBC W/AUTO DIFF WBC: CPT

## 2021-08-06 PROCEDURE — 80061 LIPID PANEL: CPT

## 2021-08-06 PROCEDURE — 83880 ASSAY OF NATRIURETIC PEPTIDE: CPT

## 2021-08-06 PROCEDURE — 36415 COLL VENOUS BLD VENIPUNCTURE: CPT

## 2021-08-06 PROCEDURE — 80053 COMPREHEN METABOLIC PANEL: CPT

## 2021-08-16 RX ORDER — GLIPIZIDE 2.5 MG/1
TABLET, EXTENDED RELEASE ORAL
Qty: 180 TABLET | Refills: 0 | Status: SHIPPED | OUTPATIENT
Start: 2021-08-16 | End: 2021-11-12

## 2021-08-16 RX ORDER — LINAGLIPTIN 5 MG/1
TABLET, FILM COATED ORAL
Qty: 30 TABLET | Refills: 0 | Status: SHIPPED | OUTPATIENT
Start: 2021-08-16 | End: 2021-09-27

## 2021-08-16 NOTE — TELEPHONE ENCOUNTER
Requested Prescriptions     Pending Prescriptions Disp Refills   • TRADJENTA 5 MG Oral Tab [Pharmacy Med Name: Tradjenta 5 MG Oral Tablet] 30 tablet 0     Sig: Take 1 tablet by mouth once daily   • glipiZIDE ER 2.5 MG Oral Tablet 24 Hr [Pharmacy Med Name:

## 2021-09-25 RX ORDER — LINAGLIPTIN 5 MG/1
TABLET, FILM COATED ORAL
Qty: 30 TABLET | Refills: 0 | Status: CANCELLED | OUTPATIENT
Start: 2021-09-25

## 2021-09-27 RX ORDER — LINAGLIPTIN 5 MG/1
TABLET, FILM COATED ORAL
Qty: 30 TABLET | Refills: 0 | Status: SHIPPED | OUTPATIENT
Start: 2021-09-27 | End: 2021-09-30

## 2021-09-27 NOTE — TELEPHONE ENCOUNTER
Requested Prescriptions     Pending Prescriptions Disp Refills   • TRADJENTA 5 MG Oral Tab [Pharmacy Med Name: Tradjenta 5 MG Oral Tablet] 30 tablet 0     Sig: Take 1 tablet by mouth once daily     FILLED- 08/16/21  LOV- 05/13/21 Telemedicine   [facility]

## 2021-09-30 RX ORDER — LINAGLIPTIN 5 MG/1
5 TABLET, FILM COATED ORAL DAILY
Qty: 30 TABLET | Refills: 2 | Status: SHIPPED | OUTPATIENT
Start: 2021-09-30 | End: 2022-01-28

## 2021-09-30 NOTE — TELEPHONE ENCOUNTER
Received refill request on Tradjenta 5mg from [facility]. Called to schedule follow up.     LOV: 05/2021  Future Appointments   Date Time Provider Craig Be   12/21/2021  4:00 PM NORA Malik [facility] [address]     A1C: 7.5% on 03/13/

## 2021-10-18 NOTE — TELEPHONE ENCOUNTER
Requested Prescriptions     Pending Prescriptions Disp Refills   • METFORMIN 500 MG Oral Tab [Pharmacy Med Name: metFORMIN HCl 500 MG Oral Tablet] 180 tablet 0     Sig: TAKE 1 TABLET BY MOUTH TWICE DAILY WITH MEALS     FILLED- 07/22/21  LOV- 05/13/21  MAK-

## 2021-11-11 ENCOUNTER — TELEPHONE (OUTPATIENT)
Dept: CARDIOLOGY | Age: 81
End: 2021-11-11

## 2021-11-11 DIAGNOSIS — Z95.5 S/P PRIMARY ANGIOPLASTY WITH CORONARY STENT: ICD-10-CM

## 2021-11-12 RX ORDER — AMIODARONE HYDROCHLORIDE 200 MG/1
TABLET ORAL
Qty: 90 TABLET | Refills: 0 | Status: SHIPPED | OUTPATIENT
Start: 2021-11-12

## 2021-11-12 RX ORDER — GLIPIZIDE 2.5 MG/1
TABLET, EXTENDED RELEASE ORAL
Qty: 180 TABLET | Refills: 0 | Status: SHIPPED | OUTPATIENT
Start: 2021-11-12

## 2021-11-12 NOTE — TELEPHONE ENCOUNTER
Requested Prescriptions     Pending Prescriptions Disp Refills   • GLIPIZIDE ER 2.5 MG Oral Tablet 24 Hr [Pharmacy Med Name: glipiZIDE ER 2.5 MG Oral Tablet Extended Release 24 Hour] 180 tablet 0     Sig: TAKE 1 TABLET BY MOUTH TWICE DAILY AS DIRECTED

## 2021-11-22 ENCOUNTER — TELEPHONE (OUTPATIENT)
Dept: FAMILY MEDICINE CLINIC | Facility: CLINIC | Age: 81
End: 2021-11-22

## 2021-11-22 NOTE — TELEPHONE ENCOUNTER
Received DM eye exam dated 11/17/21 from [facility]. Report has been abstracted.

## 2021-12-06 ENCOUNTER — APPOINTMENT (OUTPATIENT)
Dept: CARDIOLOGY | Age: 81
End: 2021-12-06

## 2021-12-06 ENCOUNTER — TELEPHONE (OUTPATIENT)
Dept: CARDIOLOGY | Age: 81
End: 2021-12-06

## 2021-12-11 ENCOUNTER — LAB ENCOUNTER (OUTPATIENT)
Dept: LAB | Facility: HOSPITAL | Age: 81
End: 2021-12-11
Attending: INTERNAL MEDICINE
Payer: MEDICAID

## 2021-12-11 DIAGNOSIS — E78.00 PURE HYPERCHOLESTEROLEMIA: ICD-10-CM

## 2021-12-11 DIAGNOSIS — E78.5 HYPERLIPEMIA: Primary | ICD-10-CM

## 2021-12-11 PROCEDURE — 36415 COLL VENOUS BLD VENIPUNCTURE: CPT

## 2021-12-11 PROCEDURE — 84443 ASSAY THYROID STIM HORMONE: CPT

## 2021-12-11 PROCEDURE — 80061 LIPID PANEL: CPT

## 2021-12-21 ENCOUNTER — TELEPHONE (OUTPATIENT)
Dept: ENDOCRINOLOGY CLINIC | Facility: CLINIC | Age: 81
End: 2021-12-21

## 2021-12-21 DIAGNOSIS — E11.65 TYPE 2 DIABETES MELLITUS WITH HYPERGLYCEMIA, WITHOUT LONG-TERM CURRENT USE OF INSULIN (HCC): Primary | ICD-10-CM

## 2021-12-21 NOTE — TELEPHONE ENCOUNTER
Ordered BMP and A1C for upcoming telehealth appt   Orders Placed This Encounter      Basic Metabolic Panel (8)      Hemoglobin A1C

## 2021-12-21 NOTE — TELEPHONE ENCOUNTER
----- Message from Rianna Coley sent at 12/21/2021  1:01 PM CST -----  Sydnee Wilder,    Have phone visit scheduled for Jan 13, Needs order for labs .     Thank you,   Victorino Cr

## 2021-12-23 NOTE — PATIENT INSTRUCTIONS
A1C 7.1%  With your age and heart history, I would be more comfortable with higher A1C due to risk of hypoglycemia with glipizide   For now,     Glipizide 5mg; 1/2 tab daily   Metformin 1000 mg twice daily     We will place the Silvina glucose to monitor you 9.13

## 2021-12-27 ENCOUNTER — TELEPHONE (OUTPATIENT)
Dept: FAMILY MEDICINE CLINIC | Facility: CLINIC | Age: 81
End: 2021-12-27

## 2021-12-27 NOTE — TELEPHONE ENCOUNTER
Received via fax the diabetic eye exam report from Dr. Kulwinder Vazquez . 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  DM flow sheet updated & the report was placed in /covering pro

## 2022-01-11 ENCOUNTER — TELEPHONE (OUTPATIENT)
Dept: ENDOCRINOLOGY CLINIC | Facility: CLINIC | Age: 82
End: 2022-01-11

## 2022-01-11 NOTE — TELEPHONE ENCOUNTER
Last DM appt 5-2021  Last A1c value was 7.5% done 3/13/2021.     Labs were ordered 12 -21 2021 to update A1C before appt

## 2022-01-12 NOTE — TELEPHONE ENCOUNTER
MANOLO says patient wasn't feeling so they were unable to do labs. Offered appointment with Fer Schneider in office or phone follow up.  R/S for phone visit with KAVITHA Salcido on 2/3/22

## 2022-01-24 NOTE — TELEPHONE ENCOUNTER
Needs labs by 2-1-2022 or appt will be r/s     In order to complete tele health appointments, patients need to complete labs as ordered by provider.  If they cannot complete these labs then they cannot complete tele health appt   DM care can be resumed w [facility]

## 2022-01-28 RX ORDER — LINAGLIPTIN 5 MG/1
TABLET, FILM COATED ORAL
Qty: 30 TABLET | Refills: 0 | Status: SHIPPED | OUTPATIENT
Start: 2022-01-28

## 2022-01-28 RX ORDER — ATORVASTATIN CALCIUM 40 MG/1
40 TABLET, FILM COATED ORAL AT BEDTIME
Qty: 30 TABLET | Refills: 0 | Status: SHIPPED | OUTPATIENT
Start: 2022-01-28

## 2022-01-28 NOTE — TELEPHONE ENCOUNTER
JIMMY:[birth year]  Future Appointments   Date Time Provider Craig Be   2/3/2022 10:30 AM NORA Vaughan [facility] [address]     A1C: 7.5% on 03/13/2021

## 2022-02-01 NOTE — TELEPHONE ENCOUNTER
Send letter   Last A1c value was 7.5% done 3/13/2021.   Last BMP 8-2021     No further refills until labs are done - may also resume DM care w PCP

## 2022-02-11 DIAGNOSIS — Z95.5 S/P PRIMARY ANGIOPLASTY WITH CORONARY STENT: ICD-10-CM

## 2022-02-12 ENCOUNTER — LAB ENCOUNTER (OUTPATIENT)
Dept: LAB | Facility: HOSPITAL | Age: 82
End: 2022-02-12
Attending: NURSE PRACTITIONER
Payer: MEDICAID

## 2022-02-12 DIAGNOSIS — R93.1 ELEVATED CORONARY ARTERY CALCIUM SCORE: ICD-10-CM

## 2022-02-12 DIAGNOSIS — E78.5 HYPERLIPIDEMIA: Primary | ICD-10-CM

## 2022-02-12 DIAGNOSIS — E87.5 HYPERKALEMIA: ICD-10-CM

## 2022-02-12 DIAGNOSIS — E78.00 PURE HYPERCHOLESTEROLEMIA: ICD-10-CM

## 2022-02-12 DIAGNOSIS — I65.23 BILATERAL CAROTID ARTERY STENOSIS: ICD-10-CM

## 2022-02-12 LAB
ALBUMIN SERPL-MCNC: 3.5 G/DL (ref 3.4–5)
ALBUMIN/GLOB SERPL: 1 {RATIO} (ref 1–2)
ALP LIVER SERPL-CCNC: 163 U/L
ALT SERPL-CCNC: 123 U/L
ANION GAP SERPL CALC-SCNC: 5 MMOL/L (ref 0–18)
AST SERPL-CCNC: 105 U/L (ref 15–37)
BASOPHILS # BLD AUTO: 0.02 X10(3) UL (ref 0–0.2)
BASOPHILS NFR BLD AUTO: 0.4 %
BILIRUB SERPL-MCNC: 0.9 MG/DL (ref 0.1–2)
BUN BLD-MCNC: 18 MG/DL (ref 7–18)
CALCIUM BLD-MCNC: 9 MG/DL (ref 8.5–10.1)
CHLORIDE SERPL-SCNC: 108 MMOL/L (ref 98–112)
CHOLEST SERPL-MCNC: 136 MG/DL (ref ?–200)
CREAT BLD-MCNC: 1.15 MG/DL
EOSINOPHIL # BLD AUTO: 0.01 X10(3) UL (ref 0–0.7)
EOSINOPHIL NFR BLD AUTO: 0.2 %
ERYTHROCYTE [DISTWIDTH] IN BLOOD BY AUTOMATED COUNT: 20.7 %
EST. AVERAGE GLUCOSE BLD GHB EST-MCNC: 160 MG/DL (ref 68–126)
FASTING PATIENT LIPID ANSWER: YES
FASTING STATUS PATIENT QL REPORTED: YES
GLOBULIN PLAS-MCNC: 3.6 G/DL (ref 2.8–4.4)
GLUCOSE BLD-MCNC: 96 MG/DL (ref 70–99)
HBA1C MFR BLD: 7.2 % (ref ?–5.7)
HCT VFR BLD AUTO: 29.2 %
HDLC SERPL-MCNC: 34 MG/DL (ref 40–59)
HGB BLD-MCNC: 9.1 G/DL
IMM GRANULOCYTES # BLD AUTO: 0.02 X10(3) UL (ref 0–1)
IMM GRANULOCYTES NFR BLD: 0.4 %
LDLC SERPL CALC-MCNC: 76 MG/DL (ref ?–100)
LYMPHOCYTES # BLD AUTO: 1.2 X10(3) UL (ref 1–4)
LYMPHOCYTES NFR BLD AUTO: 26 %
MAGNESIUM SERPL-MCNC: 1.7 MG/DL (ref 1.7–2.8)
MCH RBC QN AUTO: 21 PG (ref 26–34)
MCHC RBC AUTO-ENTMCNC: 31.2 G/DL (ref 31–37)
MCV RBC AUTO: 67.4 FL
MONOCYTES NFR BLD AUTO: 11 %
NEUTROPHILS # BLD AUTO: 2.86 X10 (3) UL (ref 1.5–7.7)
NEUTROPHILS # BLD AUTO: 2.86 X10(3) UL (ref 1.5–7.7)
NEUTROPHILS NFR BLD AUTO: 62 %
NONHDLC SERPL-MCNC: 102 MG/DL (ref ?–130)
OSMOLALITY SERPL CALC.SUM OF ELEC: 288 MOSM/KG (ref 275–295)
PLATELET # BLD AUTO: 134 10(3)UL (ref 150–450)
POTASSIUM SERPL-SCNC: 4.9 MMOL/L (ref 3.5–5.1)
PROT SERPL-MCNC: 7.1 G/DL (ref 6.4–8.2)
RBC # BLD AUTO: 4.33 X10(6)UL
SODIUM SERPL-SCNC: 138 MMOL/L (ref 136–145)
T4 SERPL-MCNC: 12.7 UG/DL
TRIGL SERPL-MCNC: 150 MG/DL (ref 30–149)
TSI SER-ACNC: 3.25 MIU/ML (ref 0.36–3.74)
VLDLC SERPL CALC-MCNC: 23 MG/DL (ref 0–30)
WBC # BLD AUTO: 4.6 X10(3) UL (ref 4–11)

## 2022-02-12 PROCEDURE — 80061 LIPID PANEL: CPT

## 2022-02-12 PROCEDURE — 84443 ASSAY THYROID STIM HORMONE: CPT

## 2022-02-12 PROCEDURE — 36415 COLL VENOUS BLD VENIPUNCTURE: CPT | Performed by: NURSE PRACTITIONER

## 2022-02-12 PROCEDURE — 84436 ASSAY OF TOTAL THYROXINE: CPT

## 2022-02-12 PROCEDURE — 85025 COMPLETE CBC W/AUTO DIFF WBC: CPT

## 2022-02-12 PROCEDURE — 83735 ASSAY OF MAGNESIUM: CPT

## 2022-02-12 PROCEDURE — 80053 COMPREHEN METABOLIC PANEL: CPT

## 2022-02-12 PROCEDURE — 83036 HEMOGLOBIN GLYCOSYLATED A1C: CPT | Performed by: NURSE PRACTITIONER

## 2022-02-14 RX ORDER — AMIODARONE HYDROCHLORIDE 200 MG/1
TABLET ORAL
Qty: 90 TABLET | Refills: 0 | OUTPATIENT
Start: 2022-02-14

## 2022-02-17 RX ORDER — GLIPIZIDE 2.5 MG/1
TABLET, EXTENDED RELEASE ORAL
Qty: 60 TABLET | Refills: 0 | Status: SHIPPED | OUTPATIENT
Start: 2022-02-17 | End: 2022-02-24

## 2022-02-24 ENCOUNTER — VIRTUAL PHONE E/M (OUTPATIENT)
Dept: ENDOCRINOLOGY CLINIC | Facility: CLINIC | Age: 82
End: 2022-02-24
Payer: MEDICAID

## 2022-02-24 DIAGNOSIS — E11.65 TYPE 2 DIABETES MELLITUS WITH HYPERGLYCEMIA, WITHOUT LONG-TERM CURRENT USE OF INSULIN (HCC): Primary | ICD-10-CM

## 2022-02-24 DIAGNOSIS — I10 ESSENTIAL HYPERTENSION: ICD-10-CM

## 2022-02-24 DIAGNOSIS — E78.5 DYSLIPIDEMIA: ICD-10-CM

## 2022-02-24 DIAGNOSIS — I25.10 CORONARY ARTERY DISEASE INVOLVING NATIVE CORONARY ARTERY OF NATIVE HEART, UNSPECIFIED WHETHER ANGINA PRESENT: ICD-10-CM

## 2022-02-24 DIAGNOSIS — R74.8 ELEVATED LIVER ENZYMES: ICD-10-CM

## 2022-02-24 PROCEDURE — 99213 OFFICE O/P EST LOW 20 MIN: CPT | Performed by: NURSE PRACTITIONER

## 2022-02-24 RX ORDER — LINAGLIPTIN 5 MG/1
5 TABLET, FILM COATED ORAL DAILY
Qty: 30 TABLET | Refills: 5 | Status: SHIPPED | OUTPATIENT
Start: 2022-02-24 | End: 2022-03-14

## 2022-02-24 RX ORDER — GLIPIZIDE 2.5 MG/1
2.5 TABLET, EXTENDED RELEASE ORAL 2 TIMES DAILY
Qty: 180 TABLET | Refills: 1 | Status: SHIPPED | OUTPATIENT
Start: 2022-02-24 | End: 2022-03-29 | Stop reason: ALTCHOICE

## 2022-02-28 DIAGNOSIS — I25.10 CAD IN NATIVE ARTERY: ICD-10-CM

## 2022-03-02 RX ORDER — NITROGLYCERIN 0.4 MG/1
TABLET SUBLINGUAL
Qty: 25 TABLET | Refills: 0 | OUTPATIENT
Start: 2022-03-02

## 2022-03-10 ENCOUNTER — TELEPHONE (OUTPATIENT)
Dept: FAMILY MEDICINE CLINIC | Facility: CLINIC | Age: 82
End: 2022-03-10

## 2022-03-10 NOTE — TELEPHONE ENCOUNTER
Shruthi states that pt's left eye has a burst blood vessel. Shruthi states pt denies any pain, discomfort, or vision changes. Discussed subconjunctival hemorrhage with Shruthi, verbalized understanding. Shruthi will keep scheduled appointment for tomorrow and evaluate how pt is in the morning. Also discussed pt lab results and Dr. Sarah Coleman recommendations, verbalized understanding. Shruthi and pt are aware, Dr. Edy Hernandez is going to have pt repeat labs this week and repeat next week after stopping cholesterol medication.

## 2022-03-10 NOTE — TELEPHONE ENCOUNTER
Future Appointments   Date Time Provider [place]   3/11/2022 12:00 PM Sunita Beverly MD [facility] [address] [facility]   8/18/2022  9:45 AM Cindi Norris APRN [facility] [address]     Daughter in law calling, patient has blood in left eye. Has had previous hemorrhaging before, not as bad.  Please advise

## 2022-03-11 ENCOUNTER — TELEPHONE (OUTPATIENT)
Dept: FAMILY MEDICINE CLINIC | Facility: CLINIC | Age: 82
End: 2022-03-11

## 2022-03-11 ENCOUNTER — OFFICE VISIT (OUTPATIENT)
Dept: FAMILY MEDICINE CLINIC | Facility: CLINIC | Age: 82
End: 2022-03-11
Payer: MEDICAID

## 2022-03-11 ENCOUNTER — LAB ENCOUNTER (OUTPATIENT)
Dept: LAB | Facility: HOSPITAL | Age: 82
End: 2022-03-11
Attending: NURSE PRACTITIONER
Payer: MEDICAID

## 2022-03-11 VITALS
TEMPERATURE: 97 F | HEIGHT: 67 IN | SYSTOLIC BLOOD PRESSURE: 132 MMHG | HEART RATE: 70 BPM | WEIGHT: 174.38 LBS | RESPIRATION RATE: 18 BRPM | OXYGEN SATURATION: 100 % | DIASTOLIC BLOOD PRESSURE: 60 MMHG | BODY MASS INDEX: 27.37 KG/M2

## 2022-03-11 DIAGNOSIS — H91.90 HEARING LOSS, UNSPECIFIED HEARING LOSS TYPE, UNSPECIFIED LATERALITY: Primary | ICD-10-CM

## 2022-03-11 DIAGNOSIS — H11.32 SUBCONJUNCTIVAL HEMATOMA, LEFT: ICD-10-CM

## 2022-03-11 DIAGNOSIS — R74.8 ELEVATED LIVER ENZYMES: Primary | ICD-10-CM

## 2022-03-11 LAB
ALBUMIN SERPL-MCNC: 2.9 G/DL (ref 3.4–5)
ALBUMIN/GLOB SERPL: 0.7 {RATIO} (ref 1–2)
ALP LIVER SERPL-CCNC: 723 U/L
ALT SERPL-CCNC: 159 U/L
ANION GAP SERPL CALC-SCNC: 10 MMOL/L (ref 0–18)
AST SERPL-CCNC: 224 U/L (ref 15–37)
BILIRUB SERPL-MCNC: 5.7 MG/DL (ref 0.1–2)
BUN BLD-MCNC: 20 MG/DL (ref 7–18)
CALCIUM BLD-MCNC: 9 MG/DL (ref 8.5–10.1)
CHLORIDE SERPL-SCNC: 106 MMOL/L (ref 98–112)
CO2 SERPL-SCNC: 20 MMOL/L (ref 21–32)
CREAT BLD-MCNC: 1.31 MG/DL
FASTING STATUS PATIENT QL REPORTED: NO
GLOBULIN PLAS-MCNC: 4 G/DL (ref 2.8–4.4)
OSMOLALITY SERPL CALC.SUM OF ELEC: 292 MOSM/KG (ref 275–295)
POTASSIUM SERPL-SCNC: 5.1 MMOL/L (ref 3.5–5.1)
PROT SERPL-MCNC: 6.9 G/DL (ref 6.4–8.2)
SODIUM SERPL-SCNC: 136 MMOL/L (ref 136–145)

## 2022-03-11 PROCEDURE — 3075F SYST BP GE 130 - 139MM HG: CPT | Performed by: FAMILY MEDICINE

## 2022-03-11 PROCEDURE — 80053 COMPREHEN METABOLIC PANEL: CPT

## 2022-03-11 PROCEDURE — 3008F BODY MASS INDEX DOCD: CPT | Performed by: FAMILY MEDICINE

## 2022-03-11 PROCEDURE — 99214 OFFICE O/P EST MOD 30 MIN: CPT | Performed by: FAMILY MEDICINE

## 2022-03-11 PROCEDURE — 3078F DIAST BP <80 MM HG: CPT | Performed by: FAMILY MEDICINE

## 2022-03-11 PROCEDURE — 36415 COLL VENOUS BLD VENIPUNCTURE: CPT

## 2022-03-11 RX ORDER — HYDRALAZINE HYDROCHLORIDE 100 MG/1
100 TABLET, FILM COATED ORAL 2 TIMES DAILY
COMMUNITY
Start: 2021-06-08 | End: 2022-03-22

## 2022-03-11 RX ORDER — POLYMYXIN B SULFATE AND TRIMETHOPRIM 1; 10000 MG/ML; [USP'U]/ML
SOLUTION OPHTHALMIC
Qty: 10 ML | Refills: 0 | Status: SHIPPED | OUTPATIENT
Start: 2022-03-11

## 2022-03-11 RX ORDER — HYDRALAZINE HYDROCHLORIDE 50 MG/1
50 TABLET, FILM COATED ORAL 2 TIMES DAILY
COMMUNITY
Start: 2021-06-08 | End: 2022-03-22

## 2022-03-11 NOTE — TELEPHONE ENCOUNTER
Spoke with Benoit Gannon and advised that Dr. Sunshine Blood will be made aware that labs were done today and levels are increase. Benoit Gannon is upset that she was not contacted regarding pt results by Dr. Piedad Dewitt cointinuing to berate this writer and demand that they be called by Dr. Sunshine Blood after repeatedly informing that Dr. Sunshine Blood is gone for the day and will be back in the office on Monday. Benoit Gannon states she would appreciate a phone call from Dr. Sunshine Blood regarding pt lab results, next steps, and getting US done sooner. Benoit Gannon states this is urgent to the pt and her family she does not feel they need to wait.      Total time 11 mintues

## 2022-03-11 NOTE — TELEPHONE ENCOUNTER
PT went to cardiology today. Pt had labs done this morning. Would like Dr. Abrahan Villatoro to be aware of the liver function labs. Would also like an earlier date for ultrasound-soonest available is March 31. Pt demanding that she receive a call back today.

## 2022-03-14 ENCOUNTER — TELEPHONE (OUTPATIENT)
Dept: FAMILY MEDICINE CLINIC | Facility: CLINIC | Age: 82
End: 2022-03-14

## 2022-03-14 NOTE — TELEPHONE ENCOUNTER
Daughter calling, giving update on patient. Patient does have some stomach discomfort, daughter spoke with patient after conversation with PCP. Daughter will call to schedule appointment with GI specialist, will call office with info.

## 2022-03-14 NOTE — TELEPHONE ENCOUNTER
I did speak to the daughter Talon Olmedo at length this morning regarding the lab results that were done on 3/11/2022. Liver enzymes are continuing to increase. Gallbladder bilirubin level is very high as well. However patient has no symptoms of liver or gallbladder disease at this time. They do have ultrasound of the abdomen scheduled on 3/31/2022. I strongly recommend that they get consultation with GI doctor as well within the next 2 weeks. I will put information for that in my chart. If they have any trouble getting into GI soon I told her to let me know. I also counseled her regarding signs of liver and gallbladder disease and if any of those occurs they should seek medical attention through the ER. Daughter agrees with management and plan.

## 2022-03-14 NOTE — TELEPHONE ENCOUNTER
Daughter requesting GI referral, as noted he is scheduled with Dr Mitch Cortes today at 3:00 at [facility]

## 2022-03-15 ENCOUNTER — APPOINTMENT (OUTPATIENT)
Dept: CT IMAGING | Facility: HOSPITAL | Age: 82
End: 2022-03-15
Attending: EMERGENCY MEDICINE
Payer: MEDICAID

## 2022-03-15 ENCOUNTER — TELEPHONE (OUTPATIENT)
Dept: FAMILY MEDICINE CLINIC | Facility: CLINIC | Age: 82
End: 2022-03-15

## 2022-03-15 ENCOUNTER — HOSPITAL ENCOUNTER (EMERGENCY)
Facility: HOSPITAL | Age: 82
Discharge: HOME | End: 2022-03-15
Attending: EMERGENCY MEDICINE
Payer: MEDICAID

## 2022-03-15 VITALS
HEART RATE: 70 BPM | TEMPERATURE: 98 F | RESPIRATION RATE: 18 BRPM | SYSTOLIC BLOOD PRESSURE: 136 MMHG | WEIGHT: 173 LBS | DIASTOLIC BLOOD PRESSURE: 75 MMHG | BODY MASS INDEX: 27.8 KG/M2 | OXYGEN SATURATION: 95 % | HEIGHT: 66 IN

## 2022-03-15 DIAGNOSIS — D50.9 IRON DEFICIENCY ANEMIA, UNSPECIFIED IRON DEFICIENCY ANEMIA TYPE: ICD-10-CM

## 2022-03-15 DIAGNOSIS — R17 JAUNDICE: ICD-10-CM

## 2022-03-15 DIAGNOSIS — R79.89 ELEVATED LFTS: Primary | ICD-10-CM

## 2022-03-15 LAB
ALBUMIN SERPL-MCNC: 2.6 G/DL (ref 3.4–5)
ALBUMIN/GLOB SERPL: 0.6 {RATIO} (ref 1–2)
ALP LIVER SERPL-CCNC: 743 U/L
ANION GAP SERPL CALC-SCNC: 7 MMOL/L (ref 0–18)
AST SERPL-CCNC: 239 U/L (ref 15–37)
BASOPHILS # BLD AUTO: 0.01 X10(3) UL (ref 0–0.2)
BASOPHILS NFR BLD AUTO: 0.2 %
BILIRUB SERPL-MCNC: 6.3 MG/DL (ref 0.1–2)
BILIRUB UR QL STRIP.AUTO: NEGATIVE
BUN BLD-MCNC: 18 MG/DL (ref 7–18)
CALCIUM BLD-MCNC: 9.4 MG/DL (ref 8.5–10.1)
CHLORIDE SERPL-SCNC: 101 MMOL/L (ref 98–112)
CLARITY UR REFRACT.AUTO: CLEAR
CO2 SERPL-SCNC: 23 MMOL/L (ref 21–32)
COLOR UR AUTO: YELLOW
CREAT BLD-MCNC: 1.23 MG/DL
EOSINOPHIL # BLD AUTO: 0.01 X10(3) UL (ref 0–0.7)
EOSINOPHIL NFR BLD AUTO: 0.2 %
ERYTHROCYTE [DISTWIDTH] IN BLOOD BY AUTOMATED COUNT: 20.3 %
GLOBULIN PLAS-MCNC: 4.3 G/DL (ref 2.8–4.4)
GLUCOSE BLD-MCNC: 111 MG/DL (ref 70–99)
GLUCOSE UR STRIP.AUTO-MCNC: NEGATIVE MG/DL
HCT VFR BLD AUTO: 25.7 %
HGB BLD-MCNC: 8 G/DL
IMM GRANULOCYTES # BLD AUTO: 0.04 X10(3) UL (ref 0–1)
IMM GRANULOCYTES NFR BLD: 0.7 %
KETONES UR STRIP.AUTO-MCNC: NEGATIVE MG/DL
LEUKOCYTE ESTERASE UR QL STRIP.AUTO: NEGATIVE
LIPASE SERPL-CCNC: 183 U/L (ref 73–393)
LYMPHOCYTES # BLD AUTO: 0.67 X10(3) UL (ref 1–4)
LYMPHOCYTES NFR BLD AUTO: 11.4 %
MCH RBC QN AUTO: 21.7 PG (ref 26–34)
MCHC RBC AUTO-ENTMCNC: 31.1 G/DL (ref 31–37)
MCV RBC AUTO: 69.6 FL
MONOCYTES # BLD AUTO: 0.67 X10(3) UL (ref 0.1–1)
MONOCYTES NFR BLD AUTO: 11.4 %
NEUTROPHILS # BLD AUTO: 4.46 X10 (3) UL (ref 1.5–7.7)
NEUTROPHILS # BLD AUTO: 4.46 X10(3) UL (ref 1.5–7.7)
NEUTROPHILS NFR BLD AUTO: 76.1 %
NITRITE UR QL STRIP.AUTO: NEGATIVE
OSMOLALITY SERPL CALC.SUM OF ELEC: 275 MOSM/KG (ref 275–295)
PH UR STRIP.AUTO: 5 [PH] (ref 5–8)
PLATELET # BLD AUTO: 197 10(3)UL (ref 150–450)
POTASSIUM SERPL-SCNC: 4.5 MMOL/L (ref 3.5–5.1)
PROT SERPL-MCNC: 6.9 G/DL (ref 6.4–8.2)
PROT UR STRIP.AUTO-MCNC: NEGATIVE MG/DL
RBC # BLD AUTO: 3.69 X10(6)UL
RBC UR QL AUTO: NEGATIVE
SODIUM SERPL-SCNC: 131 MMOL/L (ref 136–145)
SP GR UR STRIP.AUTO: 1 (ref 1–1.03)
UROBILINOGEN UR STRIP.AUTO-MCNC: <2 MG/DL
WBC # BLD AUTO: 5.9 X10(3) UL (ref 4–11)

## 2022-03-15 PROCEDURE — 96360 HYDRATION IV INFUSION INIT: CPT

## 2022-03-15 PROCEDURE — 85025 COMPLETE CBC W/AUTO DIFF WBC: CPT | Performed by: EMERGENCY MEDICINE

## 2022-03-15 PROCEDURE — 83690 ASSAY OF LIPASE: CPT | Performed by: EMERGENCY MEDICINE

## 2022-03-15 PROCEDURE — 99284 EMERGENCY DEPT VISIT MOD MDM: CPT

## 2022-03-15 PROCEDURE — 80053 COMPREHEN METABOLIC PANEL: CPT | Performed by: EMERGENCY MEDICINE

## 2022-03-15 PROCEDURE — 81003 URINALYSIS AUTO W/O SCOPE: CPT | Performed by: EMERGENCY MEDICINE

## 2022-03-15 PROCEDURE — 96361 HYDRATE IV INFUSION ADD-ON: CPT

## 2022-03-15 PROCEDURE — 99285 EMERGENCY DEPT VISIT HI MDM: CPT

## 2022-03-15 PROCEDURE — 74177 CT ABD & PELVIS W/CONTRAST: CPT | Performed by: EMERGENCY MEDICINE

## 2022-03-15 RX ORDER — IOHEXOL 350 MG/ML
100 INJECTION, SOLUTION INTRAVENOUS
Status: COMPLETED | OUTPATIENT
Start: 2022-03-15 | End: 2022-03-15

## 2022-03-15 RX ORDER — LINAGLIPTIN 5 MG/1
5 TABLET, FILM COATED ORAL DAILY
Qty: 30 TABLET | Refills: 5 | Status: SHIPPED | OUTPATIENT
Start: 2022-03-15

## 2022-03-15 RX ORDER — SODIUM CHLORIDE 9 MG/ML
INJECTION, SOLUTION INTRAVENOUS CONTINUOUS
Status: DISCONTINUED | OUTPATIENT
Start: 2022-03-15 | End: 2022-03-15

## 2022-03-15 NOTE — TELEPHONE ENCOUNTER
LOV: 02/24/2022  Future Appointments   Date Time Provider [place]   3/31/2022 10:15 AM PF [facility] PF [facility]   8/18/2022  9:45 AM Luzma Norris APRN [facility] [address]     A1C: 7.2

## 2022-03-15 NOTE — TELEPHONE ENCOUNTER
- Pt is having an un easy feeling in his stomach area. Liver enzymes are elevated also. Dr. Sally Panda suggested he go to the ER. Pt's Daughter would like to speak with a nurse before taking her Father in law to the ER.     Please call [phone] [facility]

## 2022-03-15 NOTE — TELEPHONE ENCOUNTER
Spoke to Lizz Terrell - Daughter in law of patient. Liver enzymes are elevated. Dr. Devante Hammer advised patient to go to ER- needs CT scan. Daughter states patient \"feels uneasy\"  [place] pinpot part of abdomen that is bothering him. Denies fever, nausea, vomiting/diarrhea. BM's are normal.   Appetite is good. Patient is using heating pad. Abdomen feels hard- has been like this for years. Has been weak since xmas. Advised that patient needs to go to ER per Dr. Gerardo Wolf recommendation- Cristofer Moreno will get done much faster. Daughter verbalized understanding and will bring patient.

## 2022-03-16 ENCOUNTER — TELEPHONE (OUTPATIENT)
Dept: FAMILY MEDICINE CLINIC | Facility: CLINIC | Age: 82
End: 2022-03-16

## 2022-03-16 ENCOUNTER — PATIENT OUTREACH (OUTPATIENT)
Dept: CASE MANAGEMENT | Age: 82
End: 2022-03-16

## 2022-03-16 NOTE — CM/SW NOTE
Patient's daughter in law Elia Bell) spoke to Dr. Srikanth Fontana who ordered labs for Friday and plan to do two procedures next week as well. No need for MRI per daughter in law who spoke to Dr. Srikanth Fontana. She will be in contact with the office for any other needs.

## 2022-03-16 NOTE — PROGRESS NOTES
1st attempt GI apt request    [facility] [phone]    Spoke to daughter in law Mechelle Archer and she was on the phone with the Dr office, will call back shortly.

## 2022-03-16 NOTE — TELEPHONE ENCOUNTER
ERIC RN has been in contact with cardiology office, provided her direct number for cardiology clinical staff to call back with recommendations.

## 2022-03-16 NOTE — TELEPHONE ENCOUNTER
Spoke with [facility] regarding pt and GI request to hold Xarelto and Plavix, representative requested this writer fax a written request to [address] at [phone] Attn: Dr. Liat Blackwood with written request. Representative advised that someone would call back regarding written request.     Total time: 4 minutes

## 2022-03-16 NOTE — TELEPHONE ENCOUNTER
Written request for GI sent to Dr. Dorian Lomeli requesting clearance for pt to hold Plavix for 5 days and Xarelto for 3 days prior to scheduled EUS with possible liver biopsy scheduled 3/21/22 faxed to [address] Attn: Dr. Dorian Lomeli, [phone], confirmation received.

## 2022-03-16 NOTE — CM/SW NOTE
Received request by Dr. Julio Hawkins to schedule patient MRI & MRCP with 3D within the next couple of days. Pt to also f/u with Dr. Devante Hammer () tomorrow 03/16/22. Order for outpatient scheduling to schedule GI appointment for tomorrow 03/16/22. [facility] will schedule MRI & MRCP.

## 2022-03-16 NOTE — TELEPHONE ENCOUNTER
Dr. Russell Saenz requested [facility] be contacted for clearance for pt to hold Plavix for 5 days and Xarelto for 3 days prior to EUS and possible liver biopsy scheduled for Monday March 21st. Representative states that she believes pt is no longer their pt and is now a pt of [facility] stating pt last saw Dr. Sheryl Choi. This writer states she will call [facility] for medication holding, representative advised that a written fax request can be sent to Dr. Afia Gomes at [phone] if unable to obtain holding instructions from [facility].      Total time: 5 minutes

## 2022-03-17 ENCOUNTER — TELEPHONE (OUTPATIENT)
Dept: FAMILY MEDICINE CLINIC | Facility: CLINIC | Age: 82
End: 2022-03-17

## 2022-03-17 NOTE — TELEPHONE ENCOUNTER
Please have the patient call [facility] as we have not received any response from them regarding holding off of  Plavix or Xarelto for his upcoming GI procedure on Monday 3/21/22. I will send a message to Dr. Piedad Franco as well.

## 2022-03-18 ENCOUNTER — LAB ENCOUNTER (OUTPATIENT)
Dept: LAB | Facility: HOSPITAL | Age: 82
End: 2022-03-18
Attending: INTERNAL MEDICINE
Payer: MEDICAID

## 2022-03-18 ENCOUNTER — APPOINTMENT (OUTPATIENT)
Dept: ULTRASOUND IMAGING | Facility: HOSPITAL | Age: 82
End: 2022-03-18
Attending: INTERNAL MEDICINE
Payer: MEDICAID

## 2022-03-18 ENCOUNTER — TELEPHONE (OUTPATIENT)
Dept: FAMILY MEDICINE CLINIC | Facility: CLINIC | Age: 82
End: 2022-03-18

## 2022-03-18 ENCOUNTER — HOSPITAL ENCOUNTER (INPATIENT)
Facility: HOSPITAL | Age: 82
LOS: 4 days | Discharge: HOME | End: 2022-03-22
Attending: HOSPITALIST | Admitting: HOSPITALIST
Payer: MEDICAID

## 2022-03-18 DIAGNOSIS — R17 JAUNDICE: ICD-10-CM

## 2022-03-18 DIAGNOSIS — R74.8 ELEVATED LIVER ENZYMES: ICD-10-CM

## 2022-03-18 PROBLEM — I25.10 CAD IN NATIVE ARTERY: Status: ACTIVE | Noted: 2020-02-17

## 2022-03-18 PROBLEM — K72.90 LIVER FAILURE (HCC): Status: ACTIVE | Noted: 2022-03-18

## 2022-03-18 LAB
ALBUMIN SERPL-MCNC: 2.4 G/DL (ref 3.4–5)
ALBUMIN/GLOB SERPL: 0.6 {RATIO} (ref 1–2)
ALP LIVER SERPL-CCNC: 822 U/L
ALT SERPL-CCNC: 137 U/L
AMMONIA PLAS-MCNC: 12 UMOL/L (ref 11–32)
ANION GAP SERPL CALC-SCNC: 9 MMOL/L (ref 0–18)
AST SERPL-CCNC: 210 U/L (ref 15–37)
BILIRUB DIRECT SERPL-MCNC: 6.1 MG/DL (ref 0–0.2)
BILIRUB SERPL-MCNC: 7 MG/DL (ref 0.1–2)
BUN BLD-MCNC: 16 MG/DL (ref 7–18)
CALCIUM BLD-MCNC: 9 MG/DL (ref 8.5–10.1)
CHLORIDE SERPL-SCNC: 104 MMOL/L (ref 98–112)
CO2 SERPL-SCNC: 21 MMOL/L (ref 21–32)
CREAT BLD-MCNC: 1.25 MG/DL
FASTING STATUS PATIENT QL REPORTED: NO
GLOBULIN PLAS-MCNC: 3.9 G/DL (ref 2.8–4.4)
GLUCOSE BLD-MCNC: 133 MG/DL (ref 70–99)
GLUCOSE BLD-MCNC: 139 MG/DL (ref 70–99)
GLUCOSE BLD-MCNC: 165 MG/DL (ref 70–99)
INR BLD: 2.86 (ref 0.8–1.2)
NT-PROBNP SERPL-MCNC: 885 PG/ML (ref ?–450)
OSMOLALITY SERPL CALC.SUM OF ELEC: 283 MOSM/KG (ref 275–295)
POTASSIUM SERPL-SCNC: 4.8 MMOL/L (ref 3.5–5.1)
PROT SERPL-MCNC: 6.3 G/DL (ref 6.4–8.2)
PROTHROMBIN TIME: 30.2 SECONDS (ref 11.6–14.8)
SARS-COV-2 RNA RESP QL NAA+PROBE: NOT DETECTED
SODIUM SERPL-SCNC: 134 MMOL/L (ref 136–145)

## 2022-03-18 PROCEDURE — 99223 1ST HOSP IP/OBS HIGH 75: CPT | Performed by: HOSPITALIST

## 2022-03-18 PROCEDURE — 83516 IMMUNOASSAY NONANTIBODY: CPT

## 2022-03-18 PROCEDURE — 36415 COLL VENOUS BLD VENIPUNCTURE: CPT

## 2022-03-18 PROCEDURE — 85610 PROTHROMBIN TIME: CPT

## 2022-03-18 PROCEDURE — 80053 COMPREHEN METABOLIC PANEL: CPT

## 2022-03-18 PROCEDURE — 82248 BILIRUBIN DIRECT: CPT

## 2022-03-18 PROCEDURE — 93975 VASCULAR STUDY: CPT | Performed by: INTERNAL MEDICINE

## 2022-03-18 RX ORDER — METOCLOPRAMIDE HYDROCHLORIDE 5 MG/ML
5 INJECTION INTRAMUSCULAR; INTRAVENOUS EVERY 8 HOURS PRN
Status: DISCONTINUED | OUTPATIENT
Start: 2022-03-18 | End: 2022-03-22

## 2022-03-18 RX ORDER — HYDRALAZINE HYDROCHLORIDE 50 MG/1
50 TABLET, FILM COATED ORAL 2 TIMES DAILY
Status: DISCONTINUED | OUTPATIENT
Start: 2022-03-18 | End: 2022-03-19

## 2022-03-18 RX ORDER — POLYETHYLENE GLYCOL 3350 17 G/17G
17 POWDER, FOR SOLUTION ORAL DAILY PRN
Status: DISCONTINUED | OUTPATIENT
Start: 2022-03-18 | End: 2022-03-22

## 2022-03-18 RX ORDER — LEVOFLOXACIN 5 MG/ML
500 INJECTION, SOLUTION INTRAVENOUS
Status: COMPLETED | OUTPATIENT
Start: 2022-03-21 | End: 2022-03-21

## 2022-03-18 RX ORDER — BISACODYL 10 MG
10 SUPPOSITORY, RECTAL RECTAL
Status: DISCONTINUED | OUTPATIENT
Start: 2022-03-18 | End: 2022-03-22

## 2022-03-18 RX ORDER — NICOTINE POLACRILEX 4 MG
30 LOZENGE BUCCAL
Status: DISCONTINUED | OUTPATIENT
Start: 2022-03-18 | End: 2022-03-22

## 2022-03-18 RX ORDER — AMIODARONE HYDROCHLORIDE 200 MG/1
200 TABLET ORAL DAILY
Status: DISCONTINUED | OUTPATIENT
Start: 2022-03-18 | End: 2022-03-18

## 2022-03-18 RX ORDER — AMLODIPINE BESYLATE 5 MG/1
10 TABLET ORAL DAILY
Status: DISCONTINUED | OUTPATIENT
Start: 2022-03-18 | End: 2022-03-22

## 2022-03-18 RX ORDER — SODIUM PHOSPHATE, DIBASIC AND SODIUM PHOSPHATE, MONOBASIC 7; 19 G/133ML; G/133ML
1 ENEMA RECTAL ONCE AS NEEDED
Status: DISCONTINUED | OUTPATIENT
Start: 2022-03-18 | End: 2022-03-22

## 2022-03-18 RX ORDER — SODIUM CHLORIDE, SODIUM LACTATE, POTASSIUM CHLORIDE, CALCIUM CHLORIDE 600; 310; 30; 20 MG/100ML; MG/100ML; MG/100ML; MG/100ML
INJECTION, SOLUTION INTRAVENOUS CONTINUOUS
Status: DISCONTINUED | OUTPATIENT
Start: 2022-03-18 | End: 2022-03-22

## 2022-03-18 RX ORDER — MELATONIN
3 NIGHTLY PRN
Status: DISCONTINUED | OUTPATIENT
Start: 2022-03-18 | End: 2022-03-22

## 2022-03-18 RX ORDER — ONDANSETRON 2 MG/ML
4 INJECTION INTRAMUSCULAR; INTRAVENOUS EVERY 6 HOURS PRN
Status: DISCONTINUED | OUTPATIENT
Start: 2022-03-18 | End: 2022-03-22

## 2022-03-18 RX ORDER — SENNOSIDES 8.6 MG
17.2 TABLET ORAL NIGHTLY PRN
Status: DISCONTINUED | OUTPATIENT
Start: 2022-03-18 | End: 2022-03-22

## 2022-03-18 RX ORDER — DEXTROSE MONOHYDRATE 25 G/50ML
50 INJECTION, SOLUTION INTRAVENOUS
Status: DISCONTINUED | OUTPATIENT
Start: 2022-03-18 | End: 2022-03-22

## 2022-03-18 RX ORDER — LABETALOL HYDROCHLORIDE 5 MG/ML
10 INJECTION, SOLUTION INTRAVENOUS EVERY 4 HOURS PRN
Status: DISCONTINUED | OUTPATIENT
Start: 2022-03-18 | End: 2022-03-22

## 2022-03-18 RX ORDER — NICOTINE POLACRILEX 4 MG
15 LOZENGE BUCCAL
Status: DISCONTINUED | OUTPATIENT
Start: 2022-03-18 | End: 2022-03-22

## 2022-03-18 NOTE — TELEPHONE ENCOUNTER
Pt's daughter is calling to let Dr Felisha Bell know that her father is being admitted to [address] [place] [address].

## 2022-03-18 NOTE — PROGRESS NOTES
INR high. Await remainder of labs.     If labs are worse will need to be admitted    Sent to [address]

## 2022-03-18 NOTE — PROGRESS NOTES
T bili continues to increase in setting of worsening INR    Needs to be direct admission to [facility].     SURAJ daughter

## 2022-03-18 NOTE — IMAGING NOTE
Spoke with Andre Gamino RN  Plan for [address] liver BX tentatively Monday 3/21/22 afternoon  Xarelto 48 hour hold; LD 3/17  Plavix 5 day hold; LD 3/16  Instructions reviewed   NPO Monday after MN  PT/INR in am (INR less then 1.7)  accu-check prior to arrival to [address] test  Patient is consent able needs Lonny

## 2022-03-19 ENCOUNTER — APPOINTMENT (OUTPATIENT)
Dept: MRI IMAGING | Facility: HOSPITAL | Age: 82
End: 2022-03-19
Attending: INTERNAL MEDICINE
Payer: MEDICAID

## 2022-03-19 LAB
ALBUMIN SERPL-MCNC: 2.2 G/DL (ref 3.4–5)
ALBUMIN/GLOB SERPL: 0.5 {RATIO} (ref 1–2)
ALP LIVER SERPL-CCNC: 752 U/L
ALT SERPL-CCNC: 110 U/L
ANION GAP SERPL CALC-SCNC: 6 MMOL/L (ref 0–18)
APAP SERPL-MCNC: <2 UG/ML (ref 10–30)
AST SERPL-CCNC: 158 U/L (ref 15–37)
BASOPHILS # BLD AUTO: 0.02 X10(3) UL (ref 0–0.2)
BASOPHILS NFR BLD AUTO: 0.4 %
BILIRUB SERPL-MCNC: 6 MG/DL (ref 0.1–2)
BUN BLD-MCNC: 20 MG/DL (ref 7–18)
CALCIUM BLD-MCNC: 8.9 MG/DL (ref 8.5–10.1)
CHLORIDE SERPL-SCNC: 105 MMOL/L (ref 98–112)
CO2 SERPL-SCNC: 23 MMOL/L (ref 21–32)
CREAT BLD-MCNC: 1.22 MG/DL
EOSINOPHIL # BLD AUTO: 0.01 X10(3) UL (ref 0–0.7)
EOSINOPHIL NFR BLD AUTO: 0.2 %
ERYTHROCYTE [DISTWIDTH] IN BLOOD BY AUTOMATED COUNT: 19.9 %
GLOBULIN PLAS-MCNC: 4.1 G/DL (ref 2.8–4.4)
GLUCOSE BLD-MCNC: 121 MG/DL (ref 70–99)
GLUCOSE BLD-MCNC: 141 MG/DL (ref 70–99)
GLUCOSE BLD-MCNC: 172 MG/DL (ref 70–99)
GLUCOSE BLD-MCNC: 86 MG/DL (ref 70–99)
GLUCOSE BLD-MCNC: 97 MG/DL (ref 70–99)
HCT VFR BLD AUTO: 24.6 %
HGB BLD-MCNC: 7.4 G/DL
IMM GRANULOCYTES # BLD AUTO: 0.02 X10(3) UL (ref 0–1)
IMM GRANULOCYTES NFR BLD: 0.4 %
INR BLD: 1.59 (ref 0.8–1.2)
LYMPHOCYTES # BLD AUTO: 0.84 X10(3) UL (ref 1–4)
LYMPHOCYTES NFR BLD AUTO: 15.7 %
MAGNESIUM SERPL-MCNC: 2 MG/DL (ref 1.6–2.6)
MCH RBC QN AUTO: 21.6 PG (ref 26–34)
MCHC RBC AUTO-ENTMCNC: 30.1 G/DL (ref 31–37)
MCV RBC AUTO: 71.9 FL
MONOCYTES # BLD AUTO: 0.6 X10(3) UL (ref 0.1–1)
MONOCYTES NFR BLD AUTO: 11.2 %
NEUTROPHILS # BLD AUTO: 3.85 X10 (3) UL (ref 1.5–7.7)
NEUTROPHILS # BLD AUTO: 3.85 X10(3) UL (ref 1.5–7.7)
NEUTROPHILS NFR BLD AUTO: 72.1 %
OSMOLALITY SERPL CALC.SUM OF ELEC: 280 MOSM/KG (ref 275–295)
PLATELET # BLD AUTO: 219 10(3)UL (ref 150–450)
POTASSIUM SERPL-SCNC: 4.5 MMOL/L (ref 3.5–5.1)
PROT SERPL-MCNC: 6.3 G/DL (ref 6.4–8.2)
PROTHROMBIN TIME: 19 SECONDS (ref 11.6–14.8)
RBC # BLD AUTO: 3.42 X10(6)UL
SODIUM SERPL-SCNC: 134 MMOL/L (ref 136–145)
WBC # BLD AUTO: 5.3 X10(3) UL (ref 4–11)

## 2022-03-19 PROCEDURE — 74181 MRI ABDOMEN W/O CONTRAST: CPT | Performed by: INTERNAL MEDICINE

## 2022-03-19 PROCEDURE — 99232 SBSQ HOSP IP/OBS MODERATE 35: CPT | Performed by: HOSPITALIST

## 2022-03-19 PROCEDURE — 76376 3D RENDER W/INTRP POSTPROCES: CPT | Performed by: INTERNAL MEDICINE

## 2022-03-19 RX ORDER — HYDRALAZINE HYDROCHLORIDE 50 MG/1
50 TABLET, FILM COATED ORAL 3 TIMES DAILY
Status: DISCONTINUED | OUTPATIENT
Start: 2022-03-19 | End: 2022-03-21

## 2022-03-19 NOTE — PLAN OF CARE
NURSING ADMISSION NOTE      Patient admitted via Wheelchair. Oriented to room. Safety precautions initiated. Bed in low position. Call light in reach. Admission navigator completed with son and DIL. Assumed care of pt @ 1500. Pt is A/Ox 4. Lonny speaking, son at bedside, translating. On RA, VSS, SR on tele. IV placed, no IVF ordered  Pt remains NPO  Pt denies pain, states does have fullness  Up as tolerated  Intake/outputs WNL. Plan EUS Monday, US abd doppler, MRI/MRCP tomorrow AM    Updated POC with patient and family. Will continue to monitor.

## 2022-03-19 NOTE — PLAN OF CARE
Pt alert and oriented x4. Pt Lonny speaking. Son at bedside overnight. Pt kept NPO at midnight. VSS and afebrile. Tele, NSR. No insulin needed overnight. Call light within reach. Will continue to monitor.

## 2022-03-19 NOTE — PLAN OF CARE
Patient is up & about walking to the bathroom w/ standby assistance. Denies pain, no nausea & vomiting. No SOB. Voiding w/ no difficulty. NPO Maintained for MRCP. Due morning dose of BP meds administered w/ sips. Dr Gloria Nunez) saw patient this am. Patient has EUS W/ biopsy on Monday. Repeat labs in am to determine of patient will need Vit K prior to Biopsy per Dr Fei Steel. Cardio ordered echo. MRI will call RN when ready to  patient for MRCP.

## 2022-03-19 NOTE — OCCUPATIONAL THERAPY NOTE
Orders received and chart reviewed. Attempted to see patient for Occupational Therapy evaluation, however patient off floor at MRI. Will continue to follow. Thank you.

## 2022-03-19 NOTE — PLAN OF CARE
Attempted to see patient, but off the floor. Plavix, xarelto on hold for liver bx Monday. bp meds adjusted. Tele: maintaining NSR. Echo pending. Will see tomorrow.      Jaime Hassan, APRN  3/19/2022

## 2022-03-19 NOTE — PHYSICAL THERAPY NOTE
Order received for PT evaluation, spoke to RN. Pt is currently at MRI, will re-attempt as schedule allows.

## 2022-03-20 ENCOUNTER — APPOINTMENT (OUTPATIENT)
Dept: CV DIAGNOSTICS | Facility: HOSPITAL | Age: 82
End: 2022-03-20
Attending: NURSE PRACTITIONER
Payer: MEDICAID

## 2022-03-20 LAB
ALBUMIN SERPL-MCNC: 2.2 G/DL (ref 3.4–5)
ALBUMIN/GLOB SERPL: 0.5 {RATIO} (ref 1–2)
ALP LIVER SERPL-CCNC: 753 U/L
ALT SERPL-CCNC: 106 U/L
ANION GAP SERPL CALC-SCNC: 8 MMOL/L (ref 0–18)
AST SERPL-CCNC: 157 U/L (ref 15–37)
ATRIAL RATE: 69 BPM
BASOPHILS # BLD AUTO: 0.01 X10(3) UL (ref 0–0.2)
BASOPHILS NFR BLD AUTO: 0.2 %
BILIRUB SERPL-MCNC: 5.1 MG/DL (ref 0.1–2)
BUN BLD-MCNC: 20 MG/DL (ref 7–18)
CALCIUM BLD-MCNC: 9 MG/DL (ref 8.5–10.1)
CHLORIDE SERPL-SCNC: 107 MMOL/L (ref 98–112)
CO2 SERPL-SCNC: 21 MMOL/L (ref 21–32)
CREAT BLD-MCNC: 1.25 MG/DL
DEPRECATED HBV CORE AB SER IA-ACNC: 299.8 NG/ML
EOSINOPHIL # BLD AUTO: 0.02 X10(3) UL (ref 0–0.7)
EOSINOPHIL NFR BLD AUTO: 0.4 %
ERYTHROCYTE [DISTWIDTH] IN BLOOD BY AUTOMATED COUNT: 19.6 %
F-ACTIN (SMOOTH MUSCLE) AB: 6 UNITS
GLOBULIN PLAS-MCNC: 4.2 G/DL (ref 2.8–4.4)
GLUCOSE BLD-MCNC: 110 MG/DL (ref 70–99)
GLUCOSE BLD-MCNC: 131 MG/DL (ref 70–99)
GLUCOSE BLD-MCNC: 181 MG/DL (ref 70–99)
GLUCOSE BLD-MCNC: 183 MG/DL (ref 70–99)
GLUCOSE BLD-MCNC: 205 MG/DL (ref 70–99)
HAPTOGLOB SERPL-MCNC: 241 MG/DL (ref 30–200)
HCT VFR BLD AUTO: 24.6 %
HGB BLD-MCNC: 7.1 G/DL
HGB RETIC QN AUTO: 24 PG (ref 28.2–36.6)
IMM GRANULOCYTES # BLD AUTO: 0.03 X10(3) UL (ref 0–1)
IMM GRANULOCYTES NFR BLD: 0.6 %
IMM RETICS NFR: 0.42 RATIO (ref 0.1–0.3)
INR BLD: 1.16 (ref 0.8–1.2)
IRON SATN MFR SERPL: 26 %
LDH SERPL L TO P-CCNC: 187 U/L
LYMPHOCYTES NFR BLD AUTO: 16.2 %
MCH RBC QN AUTO: 20.9 PG (ref 26–34)
MCHC RBC AUTO-ENTMCNC: 28.9 G/DL (ref 31–37)
MCV RBC AUTO: 72.6 FL
MONOCYTES # BLD AUTO: 0.61 X10(3) UL (ref 0.1–1)
MONOCYTES NFR BLD AUTO: 12.3 %
NEUTROPHILS # BLD AUTO: 3.47 X10 (3) UL (ref 1.5–7.7)
NEUTROPHILS # BLD AUTO: 3.47 X10(3) UL (ref 1.5–7.7)
NEUTROPHILS NFR BLD AUTO: 70.3 %
OSMOLALITY SERPL CALC.SUM OF ELEC: 285 MOSM/KG (ref 275–295)
P AXIS: 21 DEGREES
P-R INTERVAL: 172 MS
PLATELET # BLD AUTO: 225 10(3)UL (ref 150–450)
POTASSIUM SERPL-SCNC: 4.3 MMOL/L (ref 3.5–5.1)
PROT SERPL-MCNC: 6.4 G/DL (ref 6.4–8.2)
PROTHROMBIN TIME: 14.9 SECONDS (ref 11.6–14.8)
Q-T INTERVAL: 442 MS
QRS DURATION: 116 MS
QTC CALCULATION (BEZET): 473 MS
R AXIS: -49 DEGREES
RBC # BLD AUTO: 3.39 X10(6)UL
RETICS # AUTO: 124.2 X10(3) UL (ref 22.5–147.5)
RETICS/RBC NFR AUTO: 3.7 %
T AXIS: 59 DEGREES
TIBC SERPL-MCNC: 246 UG/DL (ref 240–450)
TRANSFERRIN SERPL-MCNC: 165 MG/DL (ref 200–360)
VENTRICULAR RATE: 69 BPM
VIT B12 SERPL-MCNC: 643 PG/ML (ref 193–986)
WBC # BLD AUTO: 4.9 X10(3) UL (ref 4–11)

## 2022-03-20 PROCEDURE — 99232 SBSQ HOSP IP/OBS MODERATE 35: CPT | Performed by: HOSPITALIST

## 2022-03-20 PROCEDURE — 93306 TTE W/DOPPLER COMPLETE: CPT | Performed by: NURSE PRACTITIONER

## 2022-03-20 NOTE — PROGRESS NOTES
Alert and orioentated x4. Pleasant. Family at bedside. Afebrile and vital signs stable. No complaiants of pain or nasuea. Resting comfortably. To have ECHO in morning.

## 2022-03-20 NOTE — PLAN OF CARE
Daughter in law Dominick Bajwa ws on the phone just now,she claims attending hasn't called her back since early afternoon when she requested a call back from the doctor. Dr Liz Yang made aware of this early afternoon. Blanca New in law was told that per MD no acute findings on the MRI. She also mentioned that patient's LE is swollen again. Both feet w/ pitting edema,BLE +1 edema, no pain. Encouraged to elevate BLE w/ pillows then info passed on to pm shift to follow up.

## 2022-03-20 NOTE — PLAN OF CARE
Alert & oriented. Pleasant & compliant w/ treatment Speaks mostly vanesa,family at bedside to translate. LR maintained at 20cc/hr. Ambulates to the bathroom for B & B needs. Voids w/ no difficulty. No glycemic reactions. 2D echo done this pm.Awaiting report. Patient has US biopsy tomorrow. Patient/ family aware of plan. Please have patient schedule for a follow up

## 2022-03-20 NOTE — PHYSICAL THERAPY NOTE
Orders received and hx and chart reviewed. Pt is ambulating in halls ad janae and does not have any PT goals at this time. Will DC order. Thank you.

## 2022-03-21 ENCOUNTER — APPOINTMENT (OUTPATIENT)
Dept: ULTRASOUND IMAGING | Facility: HOSPITAL | Age: 82
End: 2022-03-21
Attending: HOSPITALIST
Payer: MEDICAID

## 2022-03-21 LAB
ALBUMIN SERPL-MCNC: 2.1 G/DL (ref 3.4–5)
ALBUMIN/GLOB SERPL: 0.5 {RATIO} (ref 1–2)
ALP LIVER SERPL-CCNC: 648 U/L
ALT SERPL-CCNC: 98 U/L
ANA SER QL: NEGATIVE
ANION GAP SERPL CALC-SCNC: 7 MMOL/L (ref 0–18)
AST SERPL-CCNC: 145 U/L (ref 15–37)
BILIRUB SERPL-MCNC: 4.9 MG/DL (ref 0.1–2)
BUN BLD-MCNC: 23 MG/DL (ref 7–18)
CALCIUM BLD-MCNC: 8.9 MG/DL (ref 8.5–10.1)
CHLORIDE SERPL-SCNC: 105 MMOL/L (ref 98–112)
CO2 SERPL-SCNC: 21 MMOL/L (ref 21–32)
CREAT BLD-MCNC: 1.23 MG/DL
GLOBULIN PLAS-MCNC: 4.1 G/DL (ref 2.8–4.4)
GLUCOSE BLD-MCNC: 142 MG/DL (ref 70–99)
GLUCOSE BLD-MCNC: 153 MG/DL (ref 70–99)
GLUCOSE BLD-MCNC: 164 MG/DL (ref 70–99)
GLUCOSE BLD-MCNC: 167 MG/DL (ref 70–99)
GLUCOSE BLD-MCNC: 193 MG/DL (ref 70–99)
GLUCOSE BLD-MCNC: 223 MG/DL (ref 70–99)
HSV 1 GLYCOPROTEIN G, IGG: POSITIVE
HSV 2 GLYCOPROTEIN G, IGG: NEGATIVE
HSV TYPE 1/2 COMBINED ABS, IGM: 1.18 IV
INR BLD: 1.11 (ref 0.8–1.2)
MITOCHONDRIAL M2 AB, IGG: 2.4 UNITS
OSMOLALITY SERPL CALC.SUM OF ELEC: 283 MOSM/KG (ref 275–295)
PROT SERPL-MCNC: 6.2 G/DL (ref 6.4–8.2)
PROTHROMBIN TIME: 14.3 SECONDS (ref 11.6–14.8)
SODIUM SERPL-SCNC: 133 MMOL/L (ref 136–145)

## 2022-03-21 PROCEDURE — 47000 NEEDLE BIOPSY OF LIVER PERQ: CPT | Performed by: HOSPITALIST

## 2022-03-21 PROCEDURE — 99232 SBSQ HOSP IP/OBS MODERATE 35: CPT | Performed by: HOSPITALIST

## 2022-03-21 PROCEDURE — 0FB13ZX EXCISION OF RIGHT LOBE LIVER, PERCUTANEOUS APPROACH, DIAGNOSTIC: ICD-10-PCS | Performed by: RADIOLOGY

## 2022-03-21 PROCEDURE — 99152 MOD SED SAME PHYS/QHP 5/>YRS: CPT | Performed by: HOSPITALIST

## 2022-03-21 PROCEDURE — 76942 ECHO GUIDE FOR BIOPSY: CPT | Performed by: HOSPITALIST

## 2022-03-21 RX ORDER — MIDAZOLAM HYDROCHLORIDE 1 MG/ML
1 INJECTION INTRAMUSCULAR; INTRAVENOUS EVERY 5 MIN PRN
Status: ACTIVE | OUTPATIENT
Start: 2022-03-21 | End: 2022-03-21

## 2022-03-21 RX ORDER — FLUMAZENIL 0.1 MG/ML
INJECTION, SOLUTION INTRAVENOUS
Status: DISPENSED
Start: 2022-03-21 | End: 2022-03-21

## 2022-03-21 RX ORDER — FLUMAZENIL 0.1 MG/ML
0.2 INJECTION, SOLUTION INTRAVENOUS AS NEEDED
Status: DISCONTINUED | OUTPATIENT
Start: 2022-03-21 | End: 2022-03-22

## 2022-03-21 RX ORDER — NALOXONE HYDROCHLORIDE 0.4 MG/ML
80 INJECTION, SOLUTION INTRAMUSCULAR; INTRAVENOUS; SUBCUTANEOUS AS NEEDED
Status: DISCONTINUED | OUTPATIENT
Start: 2022-03-21 | End: 2022-03-22

## 2022-03-21 RX ORDER — HYDRALAZINE HYDROCHLORIDE 50 MG/1
100 TABLET, FILM COATED ORAL 3 TIMES DAILY
Status: DISCONTINUED | OUTPATIENT
Start: 2022-03-21 | End: 2022-03-22

## 2022-03-21 RX ORDER — NALOXONE HYDROCHLORIDE 0.4 MG/ML
INJECTION, SOLUTION INTRAMUSCULAR; INTRAVENOUS; SUBCUTANEOUS
Status: DISPENSED
Start: 2022-03-21 | End: 2022-03-21

## 2022-03-21 RX ORDER — SODIUM CHLORIDE 9 MG/ML
INJECTION, SOLUTION INTRAVENOUS CONTINUOUS
Status: DISCONTINUED | OUTPATIENT
Start: 2022-03-21 | End: 2022-03-22

## 2022-03-21 RX ORDER — FUROSEMIDE 20 MG/1
20 TABLET ORAL EVERY OTHER DAY
Status: DISCONTINUED | OUTPATIENT
Start: 2022-03-21 | End: 2022-03-22

## 2022-03-21 RX ORDER — MIDAZOLAM HYDROCHLORIDE 1 MG/ML
INJECTION INTRAMUSCULAR; INTRAVENOUS
Status: COMPLETED
Start: 2022-03-21 | End: 2022-03-21

## 2022-03-21 NOTE — PLAN OF CARE
Patient A&O x4. Afebrile. VSS. Tolerated liver biopsy well. Tegaderm is in place and clean, dry, and intact. No complaints of pain. Able to ambulate independently to the bathroom. Family at bedside to translate. Will continue to monitor.        Problem: METABOLIC/FLUID AND ELECTROLYTES - ADULT  Goal: Glucose maintained within prescribed range  Description: INTERVENTIONS:  - Monitor Blood Glucose as ordered  - Assess for signs and symptoms of hyperglycemia and hypoglycemia  - Administer ordered medications to maintain glucose within target range  - Assess barriers to adequate nutritional intake and initiate nutrition consult as needed  - Instruct patient on self management of diabetes  Outcome: Progressing  Goal: Hemodynamic stability and optimal renal function maintained  Description: INTERVENTIONS:  - Monitor labs and assess for signs and symptoms of volume excess or deficit  - Monitor intake, output and patient weight  - Monitor urine specific gravity, serum osmolarity and serum sodium as indicated or ordered  - Monitor response to interventions for patient's volume status, including labs, urine output, blood pressure (other measures as available)  - Encourage oral intake as appropriate  - Instruct patient on fluid and nutrition restrictions as appropriate  Outcome: Progressing

## 2022-03-21 NOTE — IMAGING NOTE
Report called to asim Bermudez on r/a, dressing CDI, presently denies pain and tolerated procedure well. Pt escorted to [facility] with Radiology RN and son at bedside.

## 2022-03-21 NOTE — PROGRESS NOTES
Pt A&Ox4, VSS and denies pain. Pt and family educated on POC, verbalizes understanding. Continuous IVF maintained. Ambulates with stand by assistance to restroom. Fall precautions in place- call light within reach and path clear. Pt maintained NPO as of midnight. Endorsed to day shift and WCTM.

## 2022-03-22 VITALS
DIASTOLIC BLOOD PRESSURE: 60 MMHG | RESPIRATION RATE: 20 BRPM | TEMPERATURE: 98 F | SYSTOLIC BLOOD PRESSURE: 142 MMHG | HEART RATE: 57 BPM | OXYGEN SATURATION: 95 % | WEIGHT: 173.81 LBS | HEIGHT: 65.75 IN | BODY MASS INDEX: 28.27 KG/M2

## 2022-03-22 LAB
ALBUMIN SERPL ELPH-MCNC: 3.29 G/DL (ref 3.75–5.21)
ALBUMIN SERPL-MCNC: 2.2 G/DL (ref 3.4–5)
ALBUMIN/GLOB SERPL: 0.6 {RATIO} (ref 1–2)
ALBUMIN/GLOB SERPL: 1.21 {RATIO} (ref 1–2)
ALP LIVER SERPL-CCNC: 692 U/L
ALPHA1 GLOB SERPL ELPH-MCNC: 0.35 G/DL (ref 0.19–0.46)
ALPHA2 GLOB SERPL ELPH-MCNC: 0.91 G/DL (ref 0.48–1.05)
ALT SERPL-CCNC: 95 U/L
ANION GAP SERPL CALC-SCNC: 7 MMOL/L (ref 0–18)
AST SERPL-CCNC: 150 U/L (ref 15–37)
B-GLOBULIN SERPL ELPH-MCNC: 0.61 G/DL (ref 0.68–1.23)
BILIRUB SERPL-MCNC: 5 MG/DL (ref 0.1–2)
BUN BLD-MCNC: 27 MG/DL (ref 7–18)
CALCIUM BLD-MCNC: 8.9 MG/DL (ref 8.5–10.1)
CHLORIDE SERPL-SCNC: 106 MMOL/L (ref 98–112)
CO2 SERPL-SCNC: 22 MMOL/L (ref 21–32)
CREAT BLD-MCNC: 1.43 MG/DL
GAMMA GLOB SERPL ELPH-MCNC: 0.84 G/DL (ref 0.62–1.7)
GLOBULIN PLAS-MCNC: 4 G/DL (ref 2.8–4.4)
GLUCOSE BLD-MCNC: 146 MG/DL (ref 70–99)
GLUCOSE BLD-MCNC: 172 MG/DL (ref 70–99)
GLUCOSE BLD-MCNC: 220 MG/DL (ref 70–99)
INR BLD: 1.18 (ref 0.8–1.2)
KAPPA LC FREE SER-MCNC: 4.82 MG/DL (ref 0.33–1.94)
KAPPA LC FREE/LAMBDA FREE SER NEPH: 1.24 {RATIO} (ref 0.26–1.65)
LAMBDA LC FREE SERPL-MCNC: 3.88 MG/DL (ref 0.57–2.63)
OSMOLALITY SERPL CALC.SUM OF ELEC: 288 MOSM/KG (ref 275–295)
POTASSIUM SERPL-SCNC: 4.2 MMOL/L (ref 3.5–5.1)
PROT SERPL-MCNC: 6 G/DL (ref 6.4–8.2)
PROT SERPL-MCNC: 6.2 G/DL (ref 6.4–8.2)
PROTHROMBIN TIME: 15 SECONDS (ref 11.6–14.8)
SODIUM SERPL-SCNC: 135 MMOL/L (ref 136–145)

## 2022-03-22 PROCEDURE — 99239 HOSP IP/OBS DSCHRG MGMT >30: CPT | Performed by: HOSPITALIST

## 2022-03-22 RX ORDER — HYDRALAZINE HYDROCHLORIDE 100 MG/1
100 TABLET, FILM COATED ORAL 3 TIMES DAILY
Refills: 0 | Status: SHIPPED | COMMUNITY
Start: 2022-03-22

## 2022-03-22 NOTE — PROGRESS NOTES
Patient seen and examined. Medically clear to discharge today. Gen: NAD  Resp: CTA  CVS: s1s2  Abd: soft, +bowel sounds  Neck: trachea is midline      Hepatitis: LFT's stable  PAF: resume DOAC. Holding amio  HFpEF: compensated    35 minutes spent on discharge planning.       Ramirez Ferreira MD

## 2022-03-22 NOTE — PLAN OF CARE
Problem: METABOLIC/FLUID AND ELECTROLYTES - ADULT  Goal: Glucose maintained within prescribed range  Description: INTERVENTIONS:  - Monitor Blood Glucose as ordered  - Assess for signs and symptoms of hyperglycemia and hypoglycemia  - Administer ordered medications to maintain glucose within target range  - Assess barriers to adequate nutritional intake and initiate nutrition consult as needed  - Instruct patient on self management of diabetes  Outcome: Progressing  Goal: Hemodynamic stability and optimal renal function maintained  Description: INTERVENTIONS:  - Monitor labs and assess for signs and symptoms of volume excess or deficit  - Monitor intake, output and patient weight  - Monitor urine specific gravity, serum osmolarity and serum sodium as indicated or ordered  - Monitor response to interventions for patient's volume status, including labs, urine output, blood pressure (other measures as available)  - Encourage oral intake as appropriate  - Instruct patient on fluid and nutrition restrictions as appropriate  Outcome: Progressing     Received pt alert and oriented x4, vanesa speaking, and son at bedside. VSS. Afebrile. RA sating at 95%. SR/SB on tele. Pt denies pain or sob. Pt had liver biopsy done yesterday and tegaderm in place. Hydralazine given per MAR orders and SBP maintained in 140s. Pt offers no other complaints at this time. Safety precautions in place and call light within reach.

## 2022-03-22 NOTE — PLAN OF CARE
NURSING DISCHARGE NOTE    Discharged Home via Wheelchair.   Accompanied by family & staff  Belongings sent w/ patient

## 2022-03-22 NOTE — PLAN OF CARE
Labs better,bilirubin 5; INR 1.8,potassium WNL. Patient denies pain,no SOB. Ambulating to the bathroom. Dose of IV iron administered. Patient will needs labs drawn on Thursday per GI & GI will call w/ results & follow up. Patient aware of this.

## 2022-03-23 ENCOUNTER — PATIENT OUTREACH (OUTPATIENT)
Dept: CASE MANAGEMENT | Age: 82
End: 2022-03-23

## 2022-03-23 ENCOUNTER — TELEPHONE (OUTPATIENT)
Dept: FAMILY MEDICINE CLINIC | Facility: CLINIC | Age: 82
End: 2022-03-23

## 2022-03-23 ENCOUNTER — TELEPHONE (OUTPATIENT)
Dept: ENDOCRINOLOGY CLINIC | Facility: CLINIC | Age: 82
End: 2022-03-23

## 2022-03-23 PROCEDURE — 1111F DSCHRG MED/CURRENT MED MERGE: CPT

## 2022-03-23 NOTE — TELEPHONE ENCOUNTER
Patient's daughter in law Tanner Andersen called in stating Xiang Tomas was recently discharged from hospital and she wanted to verify medications and timing. Surigilmar Ganesh listed on [facility] release. Pt diabetes medication regimen:  Metformin 500mg twice daily    Tradjenta 5mg once daily -  Glipizide XL 2.5mg twice daily w b/d meals     Tanner Andersen wanted to be sure patient can take all 3 medications together in the morning after breakfast, and the Glipizide and metformin together in the evening after dinner. Agreed to timing of medications, and told Tanner Ganesh I would inform Sven Humphreygabby of our call. Marco Toro is out of office this week. Asked if pt should be seen sooner, scheduled for 08/2022. If BG remains stable, follow up at that time should be okay, sooner if issues arise. Per Tanner Andersen, statin was discontinued on 03/08/2022.

## 2022-03-23 NOTE — TELEPHONE ENCOUNTER
Future Appointments   Date Time Provider [place]   3/25/2022 12:00 PM Del Schmidt MD [facility] [address] [facility]   3/31/2022 10:15 AM PF [facility] PF [facility]   8/4/2022  9:30 AM Anne Marie Norris APRN [facility]CT[facility] [address]

## 2022-03-23 NOTE — TELEPHONE ENCOUNTER
Pt discharged 3-22-22, elevated LFTs. Pt does not have HFU appt scheduled at this time. [facility] offered to schedule HFU appt with PCP however daughter declined, wanted to obtain biopsy results first then see Cardiology on 4-1-22 then see PCP. She is also requesting a phone visit; declining in office or video visit. HFU appt recommended by 3-29-22 as pt is a high risk for readmission. Please discuss with PCP and contact pt's daughter, Tanner Andersen (ok per HIPAA) accordingly. Thank you!

## 2022-03-25 ENCOUNTER — VIRTUAL PHONE E/M (OUTPATIENT)
Dept: FAMILY MEDICINE CLINIC | Facility: CLINIC | Age: 82
End: 2022-03-25
Payer: MEDICAID

## 2022-03-25 DIAGNOSIS — R74.8 ELEVATED LIVER ENZYMES: ICD-10-CM

## 2022-03-25 DIAGNOSIS — K72.00 SUBACUTE LIVER FAILURE WITHOUT HEPATIC COMA: Primary | ICD-10-CM

## 2022-03-25 PROCEDURE — 99443 PHONE E/M BY PHYS 21-30 MIN: CPT | Performed by: FAMILY MEDICINE

## 2022-03-25 PROCEDURE — 1111F DSCHRG MED/CURRENT MED MERGE: CPT | Performed by: FAMILY MEDICINE

## 2022-03-26 ENCOUNTER — LAB ENCOUNTER (OUTPATIENT)
Dept: LAB | Facility: HOSPITAL | Age: 82
End: 2022-03-26
Attending: INTERNAL MEDICINE
Payer: MEDICAID

## 2022-03-26 DIAGNOSIS — R17 JAUNDICE: ICD-10-CM

## 2022-03-26 DIAGNOSIS — R74.02 NONSPECIFIC ELEVATION OF LEVELS OF TRANSAMINASE OR LACTIC ACID DEHYDROGENASE (LDH): ICD-10-CM

## 2022-03-26 DIAGNOSIS — R74.01 NONSPECIFIC ELEVATION OF LEVELS OF TRANSAMINASE OR LACTIC ACID DEHYDROGENASE (LDH): ICD-10-CM

## 2022-03-26 LAB
ALBUMIN SERPL-MCNC: 2.3 G/DL (ref 3.4–5)
ALBUMIN/GLOB SERPL: 0.6 {RATIO} (ref 1–2)
ALP LIVER SERPL-CCNC: 840 U/L
ALT SERPL-CCNC: 104 U/L
ANION GAP SERPL CALC-SCNC: 9 MMOL/L (ref 0–18)
AST SERPL-CCNC: 172 U/L (ref 15–37)
BASOPHILS # BLD AUTO: 0.02 X10(3) UL (ref 0–0.2)
BASOPHILS NFR BLD AUTO: 0.3 %
BILIRUB SERPL-MCNC: 7.4 MG/DL (ref 0.1–2)
BUN BLD-MCNC: 29 MG/DL (ref 7–18)
CALCIUM BLD-MCNC: 8.9 MG/DL (ref 8.5–10.1)
CO2 SERPL-SCNC: 22 MMOL/L (ref 21–32)
CREAT BLD-MCNC: 1.61 MG/DL
EOSINOPHIL # BLD AUTO: 0.01 X10(3) UL (ref 0–0.7)
EOSINOPHIL NFR BLD AUTO: 0.1 %
ERYTHROCYTE [DISTWIDTH] IN BLOOD BY AUTOMATED COUNT: 21.1 %
FASTING STATUS PATIENT QL REPORTED: NO
GLOBULIN PLAS-MCNC: 4 G/DL (ref 2.8–4.4)
GLUCOSE BLD-MCNC: 186 MG/DL (ref 70–99)
HCT VFR BLD AUTO: 30 %
HGB BLD-MCNC: 9.3 G/DL
IMM GRANULOCYTES # BLD AUTO: 0.04 X10(3) UL (ref 0–1)
IMM GRANULOCYTES NFR BLD: 0.5 %
LYMPHOCYTES # BLD AUTO: 1.26 X10(3) UL (ref 1–4)
LYMPHOCYTES NFR BLD AUTO: 16 %
MCH RBC QN AUTO: 22.1 PG (ref 26–34)
MCHC RBC AUTO-ENTMCNC: 31 G/DL (ref 31–37)
MCV RBC AUTO: 71.3 FL
MONOCYTES # BLD AUTO: 0.75 X10(3) UL (ref 0.1–1)
MONOCYTES NFR BLD AUTO: 9.5 %
NEUTROPHILS # BLD AUTO: 5.8 X10 (3) UL (ref 1.5–7.7)
NEUTROPHILS NFR BLD AUTO: 73.6 %
OSMOLALITY SERPL CALC.SUM OF ELEC: 277 MOSM/KG (ref 275–295)
PLATELET # BLD AUTO: 234 10(3)UL (ref 150–450)
POTASSIUM SERPL-SCNC: 4.5 MMOL/L (ref 3.5–5.1)
PROT SERPL-MCNC: 6.3 G/DL (ref 6.4–8.2)
RBC # BLD AUTO: 4.21 X10(6)UL
SODIUM SERPL-SCNC: 128 MMOL/L (ref 136–145)
WBC # BLD AUTO: 7.9 X10(3) UL (ref 4–11)

## 2022-03-26 PROCEDURE — 36415 COLL VENOUS BLD VENIPUNCTURE: CPT

## 2022-03-26 PROCEDURE — 85025 COMPLETE CBC W/AUTO DIFF WBC: CPT

## 2022-03-26 PROCEDURE — 80053 COMPREHEN METABOLIC PANEL: CPT

## 2022-03-26 PROCEDURE — 83516 IMMUNOASSAY NONANTIBODY: CPT

## 2022-03-26 NOTE — PROGRESS NOTES
Labs are a little higher. Sodium a little lower    Prednisone as discussed    Repeat labs Tuesday ordered.     Sent to [address]

## 2022-03-29 ENCOUNTER — TELEPHONE (OUTPATIENT)
Dept: ENDOCRINOLOGY CLINIC | Facility: CLINIC | Age: 82
End: 2022-03-29

## 2022-03-29 ENCOUNTER — LAB ENCOUNTER (OUTPATIENT)
Dept: LAB | Facility: HOSPITAL | Age: 82
End: 2022-03-29
Attending: INTERNAL MEDICINE
Payer: MEDICAID

## 2022-03-29 DIAGNOSIS — R74.01 NONSPECIFIC ELEVATION OF LEVELS OF TRANSAMINASE OR LACTIC ACID DEHYDROGENASE (LDH): ICD-10-CM

## 2022-03-29 DIAGNOSIS — R74.02 NONSPECIFIC ELEVATION OF LEVELS OF TRANSAMINASE OR LACTIC ACID DEHYDROGENASE (LDH): ICD-10-CM

## 2022-03-29 DIAGNOSIS — R17 JAUNDICE: ICD-10-CM

## 2022-03-29 LAB
ALBUMIN SERPL-MCNC: 2.5 G/DL (ref 3.4–5)
ALBUMIN/GLOB SERPL: 0.6 {RATIO} (ref 1–2)
ALP LIVER SERPL-CCNC: 786 U/L
ALT SERPL-CCNC: 106 U/L
ANION GAP SERPL CALC-SCNC: 11 MMOL/L (ref 0–18)
AST SERPL-CCNC: 186 U/L (ref 15–37)
BILIRUB DIRECT SERPL-MCNC: 5.5 MG/DL (ref 0–0.2)
BILIRUB SERPL-MCNC: 6.5 MG/DL (ref 0.1–2)
BUN BLD-MCNC: 31 MG/DL (ref 7–18)
CALCIUM BLD-MCNC: 9 MG/DL (ref 8.5–10.1)
CHLORIDE SERPL-SCNC: 93 MMOL/L (ref 98–112)
CO2 SERPL-SCNC: 23 MMOL/L (ref 21–32)
CREAT BLD-MCNC: 1.68 MG/DL
F-ACTIN (SMOOTH MUSCLE) AB: 5 UNITS
FASTING STATUS PATIENT QL REPORTED: NO
GLOBULIN PLAS-MCNC: 3.9 G/DL (ref 2.8–4.4)
GLUCOSE BLD-MCNC: 211 MG/DL (ref 70–99)
OSMOLALITY SERPL CALC.SUM OF ELEC: 277 MOSM/KG (ref 275–295)
POTASSIUM SERPL-SCNC: 5.2 MMOL/L (ref 3.5–5.1)
PROT SERPL-MCNC: 6.4 G/DL (ref 6.4–8.2)
SODIUM SERPL-SCNC: 127 MMOL/L (ref 136–145)

## 2022-03-29 PROCEDURE — 36415 COLL VENOUS BLD VENIPUNCTURE: CPT

## 2022-03-29 PROCEDURE — 82248 BILIRUBIN DIRECT: CPT

## 2022-03-29 PROCEDURE — 80053 COMPREHEN METABOLIC PANEL: CPT

## 2022-03-29 RX ORDER — PEN NEEDLE, DIABETIC 32GX 5/32"
NEEDLE, DISPOSABLE MISCELLANEOUS
Qty: 50 EACH | Refills: 1 | Status: SHIPPED | OUTPATIENT
Start: 2022-03-29

## 2022-03-29 RX ORDER — INSULIN LISPRO 100 [IU]/ML
INJECTION, SOLUTION INTRAVENOUS; SUBCUTANEOUS
Qty: 15 ML | Refills: 0 | Status: SHIPPED | OUTPATIENT
Start: 2022-03-29

## 2022-03-29 NOTE — TELEPHONE ENCOUNTER
Pt daughter called in stating that pt was in hospital due to his INR was high liver bioposy and and got discharger Tuesday 3/22/22. Pt was placed on a steroid prednisone and states sugars have been okay for the mean time. Yesterday morning 7am fast blood sugar 96  Evening time 3:30 after lunch blood sugar was 120  Would like to know what to do with medication and when would she prefer to check BG due to pt being on steroid? PT was taking off statin medication as well.

## 2022-03-30 NOTE — TELEPHONE ENCOUNTER
Spoke with MANOLO, they rescheduled the insulin start to 1:30 with [facility] as they are seeing another provider earlier. They were worried about giving too many units. Explained how the pen works, by clicks and encourage her to bring them in and try it     Also explained to her to give him propel zero and gatorade zero.

## 2022-03-30 NOTE — TELEPHONE ENCOUNTER
Insulin is the safest given his active liver disease.   I understand the limitations with injecting insulin      If they desire to wait and see how his BG trends do off glipizide /metformin, they can postpone til next week

## 2022-03-30 NOTE — TELEPHONE ENCOUNTER
MANOLO called today with two questions - they discontinued Glipizide and Metformin as of  today. Michelle Yeal KAVITHA was going to replace oral meds with insulin but MANOLO said that will not work as the patient nor his wife will be able to administer and would like to discuss more options. Dr. Praveen New told patient his sodium is low and he should be sipping chicken broth or Gatorade - anything with electrolytes to help with this and she is wondering if this is okay.

## 2022-03-30 NOTE — PROGRESS NOTES
Liver enzymes a little better. Sodium a little low. 1.  Continue Prednisone 20 mg daily. 2.  Repeat labs on Monday. Ordered.     Results given to patient's daughter    Shreya Bean to [facility]

## 2022-03-31 ENCOUNTER — TELEPHONE (OUTPATIENT)
Dept: ENDOCRINOLOGY CLINIC | Facility: CLINIC | Age: 82
End: 2022-03-31

## 2022-03-31 NOTE — TELEPHONE ENCOUNTER
Received fax on pt needed PA on pen needles for insulin. Called pt pharmacy [facility] spoke to [place] and tried to get pen needles switched to other brands like Krupa or generic and still was not covered. Called pt [facility] and got PA started Bobby Zelaya will send over PA form to get filled and faxed back.

## 2022-04-01 ENCOUNTER — NURSE ONLY (OUTPATIENT)
Dept: ENDOCRINOLOGY CLINIC | Facility: CLINIC | Age: 82
End: 2022-04-01
Payer: MEDICAID

## 2022-04-01 ENCOUNTER — TELEPHONE (OUTPATIENT)
Dept: ENDOCRINOLOGY CLINIC | Facility: CLINIC | Age: 82
End: 2022-04-01

## 2022-04-01 VITALS — WEIGHT: 171 LBS | BODY MASS INDEX: 28 KG/M2

## 2022-04-01 DIAGNOSIS — E11.65 TYPE 2 DIABETES MELLITUS WITH HYPERGLYCEMIA, WITH LONG-TERM CURRENT USE OF INSULIN (HCC): Primary | ICD-10-CM

## 2022-04-01 DIAGNOSIS — Z79.4 TYPE 2 DIABETES MELLITUS WITH HYPERGLYCEMIA, WITH LONG-TERM CURRENT USE OF INSULIN (HCC): Primary | ICD-10-CM

## 2022-04-01 PROCEDURE — 1111F DSCHRG MED/CURRENT MED MERGE: CPT | Performed by: DIETITIAN, REGISTERED

## 2022-04-01 PROCEDURE — 99211 OFF/OP EST MAY X REQ PHY/QHP: CPT | Performed by: DIETITIAN, REGISTERED

## 2022-04-04 ENCOUNTER — TELEPHONE (OUTPATIENT)
Dept: ENDOCRINOLOGY CLINIC | Facility: CLINIC | Age: 82
End: 2022-04-04

## 2022-04-04 ENCOUNTER — LAB ENCOUNTER (OUTPATIENT)
Dept: LAB | Facility: HOSPITAL | Age: 82
End: 2022-04-04
Attending: INTERNAL MEDICINE
Payer: MEDICAID

## 2022-04-04 DIAGNOSIS — R74.01 NONSPECIFIC ELEVATION OF LEVELS OF TRANSAMINASE OR LACTIC ACID DEHYDROGENASE (LDH): ICD-10-CM

## 2022-04-04 DIAGNOSIS — R74.02 NONSPECIFIC ELEVATION OF LEVELS OF TRANSAMINASE OR LACTIC ACID DEHYDROGENASE (LDH): ICD-10-CM

## 2022-04-04 LAB
ALBUMIN SERPL-MCNC: 2.7 G/DL (ref 3.4–5)
ALBUMIN/GLOB SERPL: 0.7 {RATIO} (ref 1–2)
ALP LIVER SERPL-CCNC: 785 U/L
ALT SERPL-CCNC: 132 U/L
ANION GAP SERPL CALC-SCNC: 9 MMOL/L (ref 0–18)
AST SERPL-CCNC: 179 U/L (ref 15–37)
BILIRUB SERPL-MCNC: 6.2 MG/DL (ref 0.1–2)
BUN BLD-MCNC: 32 MG/DL (ref 7–18)
CALCIUM BLD-MCNC: 9.3 MG/DL (ref 8.5–10.1)
CHLORIDE SERPL-SCNC: 93 MMOL/L (ref 98–112)
CO2 SERPL-SCNC: 25 MMOL/L (ref 21–32)
CREAT BLD-MCNC: 1.74 MG/DL
FASTING STATUS PATIENT QL REPORTED: NO
GLOBULIN PLAS-MCNC: 3.8 G/DL (ref 2.8–4.4)
GLUCOSE BLD-MCNC: 296 MG/DL (ref 70–99)
OSMOLALITY SERPL CALC.SUM OF ELEC: 282 MOSM/KG (ref 275–295)
POTASSIUM SERPL-SCNC: 5.5 MMOL/L (ref 3.5–5.1)
PROT SERPL-MCNC: 6.5 G/DL (ref 6.4–8.2)
SODIUM SERPL-SCNC: 127 MMOL/L (ref 136–145)

## 2022-04-04 PROCEDURE — 80053 COMPREHEN METABOLIC PANEL: CPT

## 2022-04-04 PROCEDURE — 36415 COLL VENOUS BLD VENIPUNCTURE: CPT

## 2022-04-04 RX ORDER — BLOOD-GLUCOSE SENSOR
1 EACH MISCELLANEOUS
Qty: 3 EACH | Refills: 12 | Status: ON HOLD | OUTPATIENT
Start: 2022-04-04

## 2022-04-04 RX ORDER — BLOOD-GLUCOSE,RECEIVER,CONT
1 EACH MISCELLANEOUS ONCE
Qty: 3 EACH | Refills: 3 | Status: SHIPPED | OUTPATIENT
Start: 2022-04-04 | End: 2022-04-04

## 2022-04-04 RX ORDER — BLOOD-GLUCOSE TRANSMITTER
EACH MISCELLANEOUS
Qty: 1 EACH | Refills: 3 | Status: ON HOLD | OUTPATIENT
Start: 2022-04-04

## 2022-04-04 NOTE — TELEPHONE ENCOUNTER
Will order dexcom to pharmacy - will [facility]     Aggressive control not warranted given his age, CAD, CKD and liver disease  Pre meal goals > 100 -160   Post prandial < 250

## 2022-04-04 NOTE — TELEPHONE ENCOUNTER
Per medicare guidelines, now that he is on 3 injections daily , he could qualify   Not sure if he would get approved   If family would like to pursue, I can send Dexcom to pharmacy

## 2022-04-04 NOTE — TELEPHONE ENCOUNTER
received fax for PA pt have [facility].  Sent PA form from [facility] with OV notes and facesheet as well to      Fax confirmed

## 2022-04-05 ENCOUNTER — TELEPHONE (OUTPATIENT)
Dept: ENDOCRINOLOGY CLINIC | Facility: CLINIC | Age: 82
End: 2022-04-05

## 2022-04-05 NOTE — TELEPHONE ENCOUNTER
Pt daughter in law Benoit Gannon called today stating her father in law BG was 273. Pt wife dialed up to 2 units prim it and the dialed up again and daughter in law is afraid mother in law did it wrong. He seen estefany 4/1/22 for injectable training. . please give Benoit Gannon a call back at [phone].

## 2022-04-05 NOTE — TELEPHONE ENCOUNTER
Noted  He may need dose adjustment if technique ok Helical Rim Advancement Flap Text: The defect edges were debeveled with a #15 blade scalpel.  Given the location of the defect and the proximity to free margins (helical rim) a double helical rim advancement flap was deemed most appropriate.  Using a sterile surgical marker, the appropriate advancement flaps were drawn incorporating the defect and placing the expected incisions between the helical rim and antihelix where possible.  The area thus outlined was incised through and through with a #15 scalpel blade.  With a skin hook and iris scissors, the flaps were gently and sharply undermined and freed up.

## 2022-04-05 NOTE — TELEPHONE ENCOUNTER
Pt's daughter in law called in with questions regarding injecting insulin, storage and sliding scale. Reviewed information with her. [facility] release verified.

## 2022-04-05 NOTE — TELEPHONE ENCOUNTER
Received fax from [facility] on wrong form filled out and the decline letter due to the wrong form filled out sending that to scan.  sent out the form they would like for us to send back along with clinical notes. Filled form out an sent back too with Mary Grace HOYT help on answering questions.  Faxed too  fax confirmed and sent to scan

## 2022-04-05 NOTE — TELEPHONE ENCOUNTER
Spoke with MANOLO Somers. Yesterday before lunch BG was 273, the wife intended to give 4 units but the dial on the pen was at \"3\" when the injection was completed. Cheryl Johnson thinks they counted to 10 too fast.    Subsequently pre-dinner last night BG was 325. She declined offer to come in again for injection instruction. She will go to their house tonight to observe what they are doing.

## 2022-04-05 NOTE — TELEPHONE ENCOUNTER
received fax from [facility] for pt dexcom approved till next year 4/5/23 for transmitter sensor and . Called pt pharmacy [facility] to make sure it is going through fine got the confirm on that.  Called pt daughter in law answered and explained to contact us back once they  supplies for appt with CDE

## 2022-04-05 NOTE — TELEPHONE ENCOUNTER
Pt's MANOLO Somers called in since she hadn't heard back. After priming, they dialed up to 4 units, after injecting, dial was only down to 3. Informed her that pt would have only received one unit. This was done about noon. Advised to wait until next meal, and then dose again per sliding scale.

## 2022-04-05 NOTE — PROGRESS NOTES
Liver enzymes are a little better. Blood sugars and potassium a little high, may be prednisone related. Sodium a little low may be due to Furosemide     1. Continue Prednisone 20 mg daily. 2.  Repeat labs on Thursday again. Ordered  3. STOP Furosemide    [facility]  Will discuss with daughter.

## 2022-04-07 ENCOUNTER — LAB ENCOUNTER (OUTPATIENT)
Dept: LAB | Facility: HOSPITAL | Age: 82
End: 2022-04-07
Attending: FAMILY MEDICINE
Payer: MEDICAID

## 2022-04-07 ENCOUNTER — TELEPHONE (OUTPATIENT)
Dept: ENDOCRINOLOGY CLINIC | Facility: CLINIC | Age: 82
End: 2022-04-07

## 2022-04-07 DIAGNOSIS — R17 JAUNDICE: ICD-10-CM

## 2022-04-07 LAB
ALBUMIN SERPL-MCNC: 2.7 G/DL (ref 3.4–5)
ALBUMIN/GLOB SERPL: 0.7 {RATIO} (ref 1–2)
ALP LIVER SERPL-CCNC: 705 U/L
ALT SERPL-CCNC: 111 U/L
ANION GAP SERPL CALC-SCNC: 11 MMOL/L (ref 0–18)
AST SERPL-CCNC: 105 U/L (ref 15–37)
BILIRUB SERPL-MCNC: 4.8 MG/DL (ref 0.1–2)
BUN BLD-MCNC: 43 MG/DL (ref 7–18)
CALCIUM BLD-MCNC: 9 MG/DL (ref 8.5–10.1)
CHLORIDE SERPL-SCNC: 94 MMOL/L (ref 98–112)
CO2 SERPL-SCNC: 23 MMOL/L (ref 21–32)
CREAT BLD-MCNC: 1.61 MG/DL
FASTING STATUS PATIENT QL REPORTED: YES
GLOBULIN PLAS-MCNC: 3.7 G/DL (ref 2.8–4.4)
GLUCOSE BLD-MCNC: 221 MG/DL (ref 70–99)
OSMOLALITY SERPL CALC.SUM OF ELEC: 284 MOSM/KG (ref 275–295)
POTASSIUM SERPL-SCNC: 5.2 MMOL/L (ref 3.5–5.1)
PROT SERPL-MCNC: 6.4 G/DL (ref 6.4–8.2)
SODIUM SERPL-SCNC: 128 MMOL/L (ref 136–145)

## 2022-04-07 PROCEDURE — 80053 COMPREHEN METABOLIC PANEL: CPT

## 2022-04-07 PROCEDURE — 36415 COLL VENOUS BLD VENIPUNCTURE: CPT

## 2022-04-07 RX ORDER — GLIPIZIDE 2.5 MG/1
TABLET, EXTENDED RELEASE ORAL
Qty: 180 TABLET | Refills: 0 | COMMUNITY
Start: 2022-04-07

## 2022-04-07 NOTE — TELEPHONE ENCOUNTER
Reminded Insulin is safest option   Family states that he is really struggling with injecting insulin   They are requesting that he resume glipizide and metformin       However if pt does not want insulin he can resume glipizide - aware of risk of hypoglycemia and prolonged action time    He cannot resume Metformin due the renal insufficiency and liver disease.    Restart Glipzide XL 2.5mg twice daily   Since he just dosed Humalog, start glipizide at lunch   Start twice daily starting Friday 4-8-22       Also reports dexcom was approved ~ will contact us once picked up and schedule training

## 2022-04-07 NOTE — TELEPHONE ENCOUNTER
Pts daughter Bri Camp called regarding her father. Verified name and . She is requesting a call today from Joanne Tong to discuss her father being on insulin due to elevated glucose from prednisone. He really doesn't want to take it anymore and wants other options. Maybe metformin or glipizide in addition to tradjenta? However, with his liver issues she knows he may not be able to take metformin or glipizide so wants to discuss other options. GI put him on the prednisone and insulin sliding scale. She stated last night his glucose was 400 before dinner.  Sending request  to Joanne Tong

## 2022-04-11 ENCOUNTER — TELEPHONE (OUTPATIENT)
Dept: ENDOCRINOLOGY CLINIC | Facility: CLINIC | Age: 82
End: 2022-04-11

## 2022-04-11 ENCOUNTER — TELEPHONE (OUTPATIENT)
Dept: FAMILY MEDICINE CLINIC | Facility: CLINIC | Age: 82
End: 2022-04-11

## 2022-04-11 NOTE — TELEPHONE ENCOUNTER
Patient with poss headaches and temporal pressure for over a week. Asking for recommendations, asking if can give Acetaminophen.  Please advise

## 2022-04-11 NOTE — TELEPHONE ENCOUNTER
Pt needs f/u w PCP  Symptoms may not be related to diabetes   I would recommend continuing Glipizide XL 2.5mg twice daily and tradjenta     He does spike from breakfast/lunch meals as evidenced by pre dinner readings but fasting glucose numbers are not concerning

## 2022-04-11 NOTE — TELEPHONE ENCOUNTER
Spoke with Kevyn Lorenzo and provided Dr. Ariza Hands recommendations, verbalized understanding and will contact GI.

## 2022-04-11 NOTE — TELEPHONE ENCOUNTER
PT jordon in law called in this morning up update on pt BG over the weekend from pt being on steroids. 4/8- 7am 200 BG   Before dinner 388 BG   4/9- 7am 184 BG   Before dinner 374 BG   4/10- 7 am 169 BG  Before dinner 366 BG   4/11- 7am 139 BG      Pt is not on insulin as instructed too and pt daughter in law states pt is having a hard time walking lately and is having a lot of burning sensation on the heels on each feet.  States pt cants stand on feel for too long

## 2022-04-11 NOTE — TELEPHONE ENCOUNTER
Advised pt daughter in law Sveta Escobedo on to continue with Tradjenta and Glipizide for now.  Pt daughter in saw verbalized she understood and will keep us updated on ot BG trends

## 2022-04-11 NOTE — TELEPHONE ENCOUNTER
I would not recommend taking acetaminophen due to the liver issues. He should be able to try ibuprofen but also get the approval from the GI doctor regarding that.

## 2022-04-12 NOTE — TELEPHONE ENCOUNTER
Per daughter, dexcom was approved and waiting at pharmacy for    Will call back to schedule training

## 2022-04-15 ENCOUNTER — HOSPITAL ENCOUNTER (INPATIENT)
Facility: HOSPITAL | Age: 82
LOS: 4 days | Discharge: HOME HEALTH | End: 2022-04-20
Attending: EMERGENCY MEDICINE | Admitting: HOSPITALIST
Payer: MEDICAID

## 2022-04-15 ENCOUNTER — LAB ENCOUNTER (OUTPATIENT)
Dept: LAB | Facility: HOSPITAL | Age: 82
End: 2022-04-15
Attending: INTERNAL MEDICINE
Payer: MEDICAID

## 2022-04-15 ENCOUNTER — HOSPITAL ENCOUNTER (INPATIENT)
Facility: HOSPITAL | Age: 82
LOS: 4 days | Discharge: HOME | End: 2022-04-20
Attending: EMERGENCY MEDICINE | Admitting: HOSPITALIST
Payer: MEDICAID

## 2022-04-15 DIAGNOSIS — E87.5 HYPERKALEMIA: Primary | ICD-10-CM

## 2022-04-15 DIAGNOSIS — R17 JAUNDICE: ICD-10-CM

## 2022-04-15 DIAGNOSIS — D64.9 ANEMIA, UNSPECIFIED TYPE: ICD-10-CM

## 2022-04-15 DIAGNOSIS — K70.31 ALCOHOLIC CIRRHOSIS OF LIVER WITH ASCITES (HCC): ICD-10-CM

## 2022-04-15 DIAGNOSIS — N18.9 CHRONIC KIDNEY DISEASE, UNSPECIFIED CKD STAGE: ICD-10-CM

## 2022-04-15 DIAGNOSIS — E87.1 HYPONATREMIA: ICD-10-CM

## 2022-04-15 PROBLEM — E87.20 METABOLIC ACIDOSIS: Status: ACTIVE | Noted: 2022-04-15

## 2022-04-15 PROBLEM — E87.2 METABOLIC ACIDOSIS: Status: ACTIVE | Noted: 2022-04-15

## 2022-04-15 PROBLEM — D69.6 THROMBOCYTOPENIA (HCC): Status: ACTIVE | Noted: 2022-04-15

## 2022-04-15 PROBLEM — N17.9 ACUTE KIDNEY INJURY: Status: ACTIVE | Noted: 2022-04-15

## 2022-04-15 PROBLEM — R73.9 HYPERGLYCEMIA: Status: ACTIVE | Noted: 2022-04-15

## 2022-04-15 PROBLEM — D69.6 THROMBOCYTOPENIA: Status: ACTIVE | Noted: 2022-04-15

## 2022-04-15 PROBLEM — R79.89 AZOTEMIA: Status: ACTIVE | Noted: 2022-04-15

## 2022-04-15 PROBLEM — N17.9 ACUTE KIDNEY INJURY (HCC): Status: ACTIVE | Noted: 2022-04-15

## 2022-04-15 LAB
ACETONE: NEGATIVE
ALBUMIN SERPL-MCNC: 2.7 G/DL (ref 3.4–5)
ALBUMIN SERPL-MCNC: 2.8 G/DL (ref 3.4–5)
ALBUMIN/GLOB SERPL: 0.7 {RATIO} (ref 1–2)
ALBUMIN/GLOB SERPL: 0.8 {RATIO} (ref 1–2)
ALP LIVER SERPL-CCNC: 562 U/L
ALP LIVER SERPL-CCNC: 616 U/L
ALT SERPL-CCNC: 109 U/L
ALT SERPL-CCNC: 120 U/L
ANION GAP SERPL CALC-SCNC: 8 MMOL/L (ref 0–18)
ANION GAP SERPL CALC-SCNC: 9 MMOL/L (ref 0–18)
AST SERPL-CCNC: 107 U/L (ref 15–37)
AST SERPL-CCNC: 93 U/L (ref 15–37)
BASOPHILS # BLD AUTO: 0.01 X10(3) UL (ref 0–0.2)
BASOPHILS NFR BLD AUTO: 0.1 %
BILIRUB SERPL-MCNC: 3.6 MG/DL (ref 0.1–2)
BILIRUB SERPL-MCNC: 4.1 MG/DL (ref 0.1–2)
BUN BLD-MCNC: 40 MG/DL (ref 7–18)
BUN BLD-MCNC: 43 MG/DL (ref 7–18)
CALCIUM BLD-MCNC: 8.6 MG/DL (ref 8.5–10.1)
CALCIUM BLD-MCNC: 8.9 MG/DL (ref 8.5–10.1)
CHLORIDE SERPL-SCNC: 94 MMOL/L (ref 98–112)
CHLORIDE SERPL-SCNC: 95 MMOL/L (ref 98–112)
CO2 SERPL-SCNC: 21 MMOL/L (ref 21–32)
CO2 SERPL-SCNC: 23 MMOL/L (ref 21–32)
CREAT BLD-MCNC: 1.71 MG/DL
CREAT BLD-MCNC: 1.8 MG/DL
EOSINOPHIL # BLD AUTO: 0 X10(3) UL (ref 0–0.7)
EOSINOPHIL NFR BLD AUTO: 0 %
ERYTHROCYTE [DISTWIDTH] IN BLOOD BY AUTOMATED COUNT: 19 %
FASTING STATUS PATIENT QL REPORTED: NO
GLOBULIN PLAS-MCNC: 3.5 G/DL (ref 2.8–4.4)
GLOBULIN PLAS-MCNC: 3.7 G/DL (ref 2.8–4.4)
GLUCOSE BLD-MCNC: 352 MG/DL (ref 70–99)
GLUCOSE BLD-MCNC: 414 MG/DL (ref 70–99)
GLUCOSE BLD-MCNC: 476 MG/DL (ref 70–99)
HCT VFR BLD AUTO: 25.1 %
HGB BLD-MCNC: 8.2 G/DL
IMM GRANULOCYTES # BLD AUTO: 0.05 X10(3) UL (ref 0–1)
IMM GRANULOCYTES NFR BLD: 0.6 %
LYMPHOCYTES # BLD AUTO: 0.58 X10(3) UL (ref 1–4)
LYMPHOCYTES NFR BLD AUTO: 6.6 %
MCH RBC QN AUTO: 22.4 PG (ref 26–34)
MCHC RBC AUTO-ENTMCNC: 32.7 G/DL (ref 31–37)
MCV RBC AUTO: 68.6 FL
MONOCYTES # BLD AUTO: 0.7 X10(3) UL (ref 0.1–1)
MONOCYTES NFR BLD AUTO: 7.9 %
NEUTROPHILS # BLD AUTO: 7.51 X10 (3) UL (ref 1.5–7.7)
NEUTROPHILS # BLD AUTO: 7.51 X10(3) UL (ref 1.5–7.7)
NEUTROPHILS NFR BLD AUTO: 84.8 %
OSMOLALITY SERPL CALC.SUM OF ELEC: 286 MOSM/KG (ref 275–295)
OSMOLALITY SERPL CALC.SUM OF ELEC: 286 MOSM/KG (ref 275–295)
PLATELET # BLD AUTO: 140 10(3)UL (ref 150–450)
POTASSIUM SERPL-SCNC: 6.2 MMOL/L (ref 3.5–5.1)
POTASSIUM SERPL-SCNC: 6.8 MMOL/L (ref 3.5–5.1)
PROT SERPL-MCNC: 6.3 G/DL (ref 6.4–8.2)
PROT SERPL-MCNC: 6.4 G/DL (ref 6.4–8.2)
RBC # BLD AUTO: 3.66 X10(6)UL
SARS-COV-2 RNA RESP QL NAA+PROBE: NOT DETECTED
SODIUM SERPL-SCNC: 124 MMOL/L (ref 136–145)
SODIUM SERPL-SCNC: 126 MMOL/L (ref 136–145)
WBC # BLD AUTO: 8.9 X10(3) UL (ref 4–11)

## 2022-04-15 PROCEDURE — 80053 COMPREHEN METABOLIC PANEL: CPT

## 2022-04-15 PROCEDURE — 36415 COLL VENOUS BLD VENIPUNCTURE: CPT

## 2022-04-15 RX ORDER — CLOPIDOGREL BISULFATE 75 MG/1
TABLET ORAL
Qty: 90 TABLET | Refills: 0 | OUTPATIENT
Start: 2022-04-15

## 2022-04-15 RX ORDER — INSULIN ASPART 100 [IU]/ML
0.2 INJECTION, SOLUTION INTRAVENOUS; SUBCUTANEOUS ONCE
Status: COMPLETED | OUTPATIENT
Start: 2022-04-15 | End: 2022-04-15

## 2022-04-15 RX ORDER — CALCIUM GLUCONATE 10 MG/ML
1 INJECTION, SOLUTION INTRAVENOUS ONCE
Status: COMPLETED | OUTPATIENT
Start: 2022-04-15 | End: 2022-04-16

## 2022-04-16 ENCOUNTER — APPOINTMENT (OUTPATIENT)
Dept: ULTRASOUND IMAGING | Facility: HOSPITAL | Age: 82
End: 2022-04-16
Attending: INTERNAL MEDICINE
Payer: MEDICAID

## 2022-04-16 PROBLEM — D64.9 ANEMIA, UNSPECIFIED TYPE: Status: ACTIVE | Noted: 2022-04-16

## 2022-04-16 PROBLEM — K70.31 ALCOHOLIC CIRRHOSIS OF LIVER WITH ASCITES (HCC): Status: ACTIVE | Noted: 2022-04-16

## 2022-04-16 PROBLEM — N18.9 CHRONIC KIDNEY DISEASE, UNSPECIFIED CKD STAGE: Status: ACTIVE | Noted: 2022-04-16

## 2022-04-16 LAB
ALBUMIN SERPL-MCNC: 2.6 G/DL (ref 3.4–5)
ALBUMIN/GLOB SERPL: 0.7 {RATIO} (ref 1–2)
ALP LIVER SERPL-CCNC: 548 U/L
ALT SERPL-CCNC: 101 U/L
ANION GAP SERPL CALC-SCNC: 7 MMOL/L (ref 0–18)
ANION GAP SERPL CALC-SCNC: 7 MMOL/L (ref 0–18)
AST SERPL-CCNC: 81 U/L (ref 15–37)
ATRIAL RATE: 72 BPM
BASOPHILS # BLD AUTO: 0.01 X10(3) UL (ref 0–0.2)
BASOPHILS NFR BLD AUTO: 0.1 %
BILIRUB SERPL-MCNC: 3.2 MG/DL (ref 0.1–2)
BILIRUB UR QL STRIP.AUTO: NEGATIVE
BUN BLD-MCNC: 42 MG/DL (ref 7–18)
BUN BLD-MCNC: 43 MG/DL (ref 7–18)
CALCIUM BLD-MCNC: 9.2 MG/DL (ref 8.5–10.1)
CALCIUM BLD-MCNC: 9.2 MG/DL (ref 8.5–10.1)
CHLORIDE SERPL-SCNC: 97 MMOL/L (ref 98–112)
CHLORIDE SERPL-SCNC: 98 MMOL/L (ref 98–112)
CLARITY UR REFRACT.AUTO: CLEAR
CO2 SERPL-SCNC: 23 MMOL/L (ref 21–32)
CO2 SERPL-SCNC: 25 MMOL/L (ref 21–32)
CREAT BLD-MCNC: 1.62 MG/DL
CREAT BLD-MCNC: 1.71 MG/DL
CREAT UR-SCNC: <13 MG/DL
EOSINOPHIL # BLD AUTO: 0 X10(3) UL (ref 0–0.7)
EOSINOPHIL NFR BLD AUTO: 0 %
ERYTHROCYTE [DISTWIDTH] IN BLOOD BY AUTOMATED COUNT: 18.6 %
GLOBULIN PLAS-MCNC: 3.7 G/DL (ref 2.8–4.4)
GLUCOSE BLD-MCNC: 162 MG/DL (ref 70–99)
GLUCOSE BLD-MCNC: 182 MG/DL (ref 70–99)
GLUCOSE BLD-MCNC: 223 MG/DL (ref 70–99)
GLUCOSE BLD-MCNC: 298 MG/DL (ref 70–99)
GLUCOSE BLD-MCNC: 302 MG/DL (ref 70–99)
GLUCOSE BLD-MCNC: 420 MG/DL (ref 70–99)
GLUCOSE BLD-MCNC: 462 MG/DL (ref 70–99)
GLUCOSE UR STRIP.AUTO-MCNC: NEGATIVE MG/DL
HCT VFR BLD AUTO: 24.5 %
HGB BLD-MCNC: 7.8 G/DL
IMM GRANULOCYTES # BLD AUTO: 0.09 X10(3) UL (ref 0–1)
IMM GRANULOCYTES NFR BLD: 0.9 %
KETONES UR STRIP.AUTO-MCNC: NEGATIVE MG/DL
LEUKOCYTE ESTERASE UR QL STRIP.AUTO: NEGATIVE
LYMPHOCYTES # BLD AUTO: 0.43 X10(3) UL (ref 1–4)
LYMPHOCYTES NFR BLD AUTO: 4.1 %
MCH RBC QN AUTO: 22.5 PG (ref 26–34)
MCHC RBC AUTO-ENTMCNC: 31.8 G/DL (ref 31–37)
MCV RBC AUTO: 70.6 FL
MICROALBUMIN UR-MCNC: 1.11 MG/DL
MONOCYTES # BLD AUTO: 0.9 X10(3) UL (ref 0.1–1)
MONOCYTES NFR BLD AUTO: 8.7 %
NEUTROPHILS # BLD AUTO: 8.97 X10 (3) UL (ref 1.5–7.7)
NEUTROPHILS # BLD AUTO: 8.97 X10(3) UL (ref 1.5–7.7)
NEUTROPHILS NFR BLD AUTO: 86.2 %
NITRITE UR QL STRIP.AUTO: NEGATIVE
OSMOLALITY SERPL CALC.SUM OF ELEC: 281 MOSM/KG (ref 275–295)
OSMOLALITY SERPL CALC.SUM OF ELEC: 285 MOSM/KG (ref 275–295)
P AXIS: 3 DEGREES
P-R INTERVAL: 218 MS
PH UR STRIP.AUTO: 6 [PH] (ref 5–8)
PLATELET # BLD AUTO: 133 10(3)UL (ref 150–450)
POTASSIUM SERPL-SCNC: 5 MMOL/L (ref 3.5–5.1)
POTASSIUM SERPL-SCNC: 5.2 MMOL/L (ref 3.5–5.1)
PROT SERPL-MCNC: 6.3 G/DL (ref 6.4–8.2)
PROT UR STRIP.AUTO-MCNC: NEGATIVE MG/DL
Q-T INTERVAL: 426 MS
QRS DURATION: 150 MS
QTC CALCULATION (BEZET): 466 MS
R AXIS: -54 DEGREES
RBC # BLD AUTO: 3.47 X10(6)UL
RBC UR QL AUTO: NEGATIVE
SODIUM SERPL-SCNC: 128 MMOL/L (ref 136–145)
SODIUM SERPL-SCNC: 129 MMOL/L (ref 136–145)
SODIUM SERPL-SCNC: 90 MMOL/L
SP GR UR STRIP.AUTO: 1.01 (ref 1–1.03)
T AXIS: 89 DEGREES
UROBILINOGEN UR STRIP.AUTO-MCNC: <2 MG/DL
VENTRICULAR RATE: 72 BPM
WBC # BLD AUTO: 10.4 X10(3) UL (ref 4–11)

## 2022-04-16 PROCEDURE — 99223 1ST HOSP IP/OBS HIGH 75: CPT | Performed by: INTERNAL MEDICINE

## 2022-04-16 PROCEDURE — 76770 US EXAM ABDO BACK WALL COMP: CPT | Performed by: INTERNAL MEDICINE

## 2022-04-16 RX ORDER — POLYETHYLENE GLYCOL 3350 17 G/17G
17 POWDER, FOR SOLUTION ORAL DAILY PRN
Status: DISCONTINUED | OUTPATIENT
Start: 2022-04-16 | End: 2022-04-20

## 2022-04-16 RX ORDER — BISACODYL 10 MG
10 SUPPOSITORY, RECTAL RECTAL
Status: DISCONTINUED | OUTPATIENT
Start: 2022-04-16 | End: 2022-04-20

## 2022-04-16 RX ORDER — NICOTINE POLACRILEX 4 MG
15 LOZENGE BUCCAL
Status: DISCONTINUED | OUTPATIENT
Start: 2022-04-16 | End: 2022-04-20

## 2022-04-16 RX ORDER — AMLODIPINE BESYLATE 5 MG/1
10 TABLET ORAL DAILY
Status: DISCONTINUED | OUTPATIENT
Start: 2022-04-16 | End: 2022-04-16

## 2022-04-16 RX ORDER — HYDRALAZINE HYDROCHLORIDE 50 MG/1
100 TABLET, FILM COATED ORAL 3 TIMES DAILY
Status: DISCONTINUED | OUTPATIENT
Start: 2022-04-16 | End: 2022-04-20

## 2022-04-16 RX ORDER — SENNOSIDES 8.6 MG
17.2 TABLET ORAL NIGHTLY PRN
Status: DISCONTINUED | OUTPATIENT
Start: 2022-04-16 | End: 2022-04-20

## 2022-04-16 RX ORDER — CLOPIDOGREL BISULFATE 75 MG/1
75 TABLET ORAL DAILY
Status: DISCONTINUED | OUTPATIENT
Start: 2022-04-16 | End: 2022-04-20

## 2022-04-16 RX ORDER — PREDNISONE 20 MG/1
20 TABLET ORAL DAILY
Status: DISCONTINUED | OUTPATIENT
Start: 2022-04-16 | End: 2022-04-17

## 2022-04-16 RX ORDER — FUROSEMIDE 10 MG/ML
20 INJECTION INTRAMUSCULAR; INTRAVENOUS ONCE
Status: COMPLETED | OUTPATIENT
Start: 2022-04-16 | End: 2022-04-16

## 2022-04-16 RX ORDER — DEXTROSE MONOHYDRATE 25 G/50ML
50 INJECTION, SOLUTION INTRAVENOUS
Status: DISCONTINUED | OUTPATIENT
Start: 2022-04-16 | End: 2022-04-20

## 2022-04-16 RX ORDER — HEPARIN SODIUM 5000 [USP'U]/ML
5000 INJECTION, SOLUTION INTRAVENOUS; SUBCUTANEOUS EVERY 8 HOURS SCHEDULED
Status: DISCONTINUED | OUTPATIENT
Start: 2022-04-16 | End: 2022-04-16

## 2022-04-16 RX ORDER — MELATONIN
3 NIGHTLY PRN
Status: DISCONTINUED | OUTPATIENT
Start: 2022-04-16 | End: 2022-04-20

## 2022-04-16 RX ORDER — NICOTINE POLACRILEX 4 MG
30 LOZENGE BUCCAL
Status: DISCONTINUED | OUTPATIENT
Start: 2022-04-16 | End: 2022-04-20

## 2022-04-16 RX ORDER — BUMETANIDE 0.25 MG/ML
2 INJECTION, SOLUTION INTRAMUSCULAR; INTRAVENOUS
Status: COMPLETED | OUTPATIENT
Start: 2022-04-16 | End: 2022-04-18

## 2022-04-16 RX ORDER — ACETAMINOPHEN 325 MG/1
650 TABLET ORAL EVERY 6 HOURS PRN
Status: DISCONTINUED | OUTPATIENT
Start: 2022-04-16 | End: 2022-04-20

## 2022-04-16 RX ORDER — ONDANSETRON 2 MG/ML
4 INJECTION INTRAMUSCULAR; INTRAVENOUS EVERY 6 HOURS PRN
Status: DISCONTINUED | OUTPATIENT
Start: 2022-04-16 | End: 2022-04-20

## 2022-04-16 RX ORDER — CARVEDILOL 6.25 MG/1
6.25 TABLET ORAL 2 TIMES DAILY WITH MEALS
Status: DISCONTINUED | OUTPATIENT
Start: 2022-04-16 | End: 2022-04-20

## 2022-04-16 NOTE — PLAN OF CARE
Problem: PAIN - ADULT  Goal: Verbalizes/displays adequate comfort level or patient's stated pain goal  Description: INTERVENTIONS:  - Encourage pt to monitor pain and request assistance  - Assess pain using appropriate pain scale  - Administer analgesics based on type and severity of pain and evaluate response  - Implement non-pharmacological measures as appropriate and evaluate response  - Consider cultural and social influences on pain and pain management  - Manage/alleviate anxiety  - Utilize distraction and/or relaxation techniques  - Monitor for opioid side effects  - Notify MD/LIP if interventions unsuccessful or patient reports new pain  - Anticipate increased pain with activity and pre-medicate as appropriate  Outcome: Progressing   Patient has been up ambulating in room with a steady gait. patient has been voiding in good amounts was started on iv bumex . Noted that bilat feet are still swollen . Blood sugars have been elevated and covered with sliding scale insulin. Patient is awaiting kidney ultrasound. Patient and his wife updated that ultrasound would be later tonight. denies any c/o pain.

## 2022-04-16 NOTE — ED QUICK NOTES
Orders for admission, patient is aware of plan and ready to go upstairs. Any questions, please call ED RN Bo Marie at extension [phone].      Patient Covid vaccination status: Fully vaccinated     COVID Test Ordered in ED: Rapid SARS-CoV-2 by PCR    COVID Suspicion at Admission: Low clinical suspicion for COVID    Running Infusions:      Mental Status/LOC at time of transport: AOx4    Other pertinent information:   CIWA score: N/A   NIH score:  N/A

## 2022-04-16 NOTE — PLAN OF CARE
NURSING ADMISSION NOTE      Patient admitted via Cart  Oriented to room. Safety precautions initiated. Bed in low position. Call light in reach. Patient admitted for elevated potassium 6.8. Patient alert and oriented. Speaks mainly vanesa/gujarati. Son at bedside to translate. Database completed. Denies any pain. Fall precautions in place. One time dose of IV lasix 20mg given per MD order. Accucheck thi am 162 and no inulin needed. Potassium this morning was 5.2. Nephrology consult called,awaitng call back.

## 2022-04-17 LAB
ALBUMIN SERPL-MCNC: 2.4 G/DL (ref 3.4–5)
ALBUMIN/GLOB SERPL: 0.8 {RATIO} (ref 1–2)
ALP LIVER SERPL-CCNC: 471 U/L
ALT SERPL-CCNC: 88 U/L
ANION GAP SERPL CALC-SCNC: 8 MMOL/L (ref 0–18)
AST SERPL-CCNC: 70 U/L (ref 15–37)
BILIRUB SERPL-MCNC: 2.8 MG/DL (ref 0.1–2)
BUN BLD-MCNC: 55 MG/DL (ref 7–18)
CALCIUM BLD-MCNC: 8.9 MG/DL (ref 8.5–10.1)
CHLORIDE SERPL-SCNC: 97 MMOL/L (ref 98–112)
CO2 SERPL-SCNC: 27 MMOL/L (ref 21–32)
CREAT BLD-MCNC: 1.87 MG/DL
GLOBULIN PLAS-MCNC: 3.2 G/DL (ref 2.8–4.4)
GLUCOSE BLD-MCNC: 112 MG/DL (ref 70–99)
GLUCOSE BLD-MCNC: 135 MG/DL (ref 70–99)
GLUCOSE BLD-MCNC: 251 MG/DL (ref 70–99)
GLUCOSE BLD-MCNC: 395 MG/DL (ref 70–99)
GLUCOSE BLD-MCNC: 422 MG/DL (ref 70–99)
OSMOLALITY SERPL CALC.SUM OF ELEC: 290 MOSM/KG (ref 275–295)
POTASSIUM SERPL-SCNC: 4.2 MMOL/L (ref 3.5–5.1)
PROT SERPL-MCNC: 5.6 G/DL (ref 6.4–8.2)
SODIUM SERPL-SCNC: 132 MMOL/L (ref 136–145)

## 2022-04-17 PROCEDURE — 99232 SBSQ HOSP IP/OBS MODERATE 35: CPT | Performed by: INTERNAL MEDICINE

## 2022-04-17 RX ORDER — PREDNISONE 10 MG/1
10 TABLET ORAL
Status: DISCONTINUED | OUTPATIENT
Start: 2022-04-18 | End: 2022-04-19

## 2022-04-17 NOTE — PLAN OF CARE
Urinating without difficulty, legs are swollen, denies SOB, ambulate with 1 assist, had renal US tonight r/t abnormal labs. Blood sugar still elevated at bedtime, text paged to hospitalist insulin coverage given,  will cont to monitor, son at the bedside.     Problem: METABOLIC/FLUID AND ELECTROLYTES - ADULT  Goal: Glucose maintained within prescribed range  Description: INTERVENTIONS:  - Monitor Blood Glucose as ordered  - Assess for signs and symptoms of hyperglycemia and hypoglycemia  - Administer ordered medications to maintain glucose within target range  - Assess barriers to adequate nutritional intake and initiate nutrition consult as needed  - Instruct patient on self management of diabetes  4/17/2022 0333 by Maykel Mina RN  Outcome: Not Progressing  4/17/2022 0321 by Maykel Mina RN  Outcome: Not Progressing  Goal: Electrolytes maintained within normal limits  Description: INTERVENTIONS:  - Monitor labs and rhythm and assess patient for signs and symptoms of electrolyte imbalances  - Administer electrolyte replacement as ordered  - Monitor response to electrolyte replacements, including rhythm and repeat lab results as appropriate  - Fluid restriction as ordered  - Instruct patient on fluid and nutrition restrictions as appropriate  4/17/2022 0333 by Maykel Mina RN  Outcome: Progressing  4/17/2022 0321 by Maykel Mina RN  Outcome: Progressing

## 2022-04-17 NOTE — PROGRESS NOTES
Patient has been up ambulating in room gait steady edema to bilat feet has improved 1-2+ bilat. Has been voiding in good amounts per urinal. bloodsugar elevated this evening received 7 units also Dr Nori Coe informed and new orders received.

## 2022-04-18 LAB
ALBUMIN SERPL-MCNC: 2.5 G/DL (ref 3.4–5)
ALBUMIN/GLOB SERPL: 0.8 {RATIO} (ref 1–2)
ALP LIVER SERPL-CCNC: 469 U/L
ALT SERPL-CCNC: 88 U/L
ANION GAP SERPL CALC-SCNC: 8 MMOL/L (ref 0–18)
AST SERPL-CCNC: 77 U/L (ref 15–37)
BILIRUB SERPL-MCNC: 2.8 MG/DL (ref 0.1–2)
BUN BLD-MCNC: 74 MG/DL (ref 7–18)
CALCIUM BLD-MCNC: 8.9 MG/DL (ref 8.5–10.1)
CHLORIDE SERPL-SCNC: 94 MMOL/L (ref 98–112)
CO2 SERPL-SCNC: 28 MMOL/L (ref 21–32)
CREAT BLD-MCNC: 2.12 MG/DL
GLOBULIN PLAS-MCNC: 3.2 G/DL (ref 2.8–4.4)
GLUCOSE BLD-MCNC: 246 MG/DL (ref 70–99)
GLUCOSE BLD-MCNC: 273 MG/DL (ref 70–99)
GLUCOSE BLD-MCNC: 315 MG/DL (ref 70–99)
GLUCOSE BLD-MCNC: 352 MG/DL (ref 70–99)
GLUCOSE BLD-MCNC: 353 MG/DL (ref 70–99)
OSMOLALITY SERPL CALC.SUM OF ELEC: 300 MOSM/KG (ref 275–295)
POTASSIUM SERPL-SCNC: 4 MMOL/L (ref 3.5–5.1)
PROT SERPL-MCNC: 5.7 G/DL (ref 6.4–8.2)
SODIUM SERPL-SCNC: 130 MMOL/L (ref 136–145)

## 2022-04-18 PROCEDURE — 99232 SBSQ HOSP IP/OBS MODERATE 35: CPT | Performed by: HOSPITALIST

## 2022-04-18 RX ORDER — FUROSEMIDE 40 MG/1
40 TABLET ORAL DAILY
Status: DISCONTINUED | OUTPATIENT
Start: 2022-04-18 | End: 2022-04-20

## 2022-04-19 ENCOUNTER — APPOINTMENT (OUTPATIENT)
Dept: CT IMAGING | Facility: HOSPITAL | Age: 82
End: 2022-04-19
Attending: INTERNAL MEDICINE
Payer: MEDICAID

## 2022-04-19 LAB
ALBUMIN SERPL-MCNC: 2.7 G/DL (ref 3.4–5)
ALBUMIN/GLOB SERPL: 0.8 {RATIO} (ref 1–2)
ALP LIVER SERPL-CCNC: 497 U/L
ALT SERPL-CCNC: 105 U/L
ANION GAP SERPL CALC-SCNC: 9 MMOL/L (ref 0–18)
AST SERPL-CCNC: 97 U/L (ref 15–37)
BILIRUB SERPL-MCNC: 3.1 MG/DL (ref 0.1–2)
BUN BLD-MCNC: 75 MG/DL (ref 7–18)
CALCIUM BLD-MCNC: 8.9 MG/DL (ref 8.5–10.1)
CHLORIDE SERPL-SCNC: 95 MMOL/L (ref 98–112)
CO2 SERPL-SCNC: 28 MMOL/L (ref 21–32)
CREAT BLD-MCNC: 2.16 MG/DL
GLOBULIN PLAS-MCNC: 3.6 G/DL (ref 2.8–4.4)
GLUCOSE BLD-MCNC: 111 MG/DL (ref 70–99)
GLUCOSE BLD-MCNC: 166 MG/DL (ref 70–99)
GLUCOSE BLD-MCNC: 205 MG/DL (ref 70–99)
GLUCOSE BLD-MCNC: 221 MG/DL (ref 70–99)
GLUCOSE BLD-MCNC: 269 MG/DL (ref 70–99)
GLUCOSE BLD-MCNC: 386 MG/DL (ref 70–99)
GLUCOSE BLD-MCNC: 403 MG/DL (ref 70–99)
GLUCOSE BLD-MCNC: 62 MG/DL (ref 70–99)
GLUCOSE BLD-MCNC: 66 MG/DL (ref 70–99)
OSMOLALITY SERPL CALC.SUM OF ELEC: 300 MOSM/KG (ref 275–295)
POTASSIUM SERPL-SCNC: 3.6 MMOL/L (ref 3.5–5.1)
PROT SERPL-MCNC: 6.3 G/DL (ref 6.4–8.2)
SODIUM SERPL-SCNC: 132 MMOL/L (ref 136–145)

## 2022-04-19 PROCEDURE — 99232 SBSQ HOSP IP/OBS MODERATE 35: CPT | Performed by: HOSPITALIST

## 2022-04-19 PROCEDURE — 70450 CT HEAD/BRAIN W/O DYE: CPT | Performed by: INTERNAL MEDICINE

## 2022-04-19 RX ORDER — POTASSIUM CHLORIDE 20 MEQ/1
40 TABLET, EXTENDED RELEASE ORAL ONCE
Status: COMPLETED | OUTPATIENT
Start: 2022-04-19 | End: 2022-04-19

## 2022-04-19 RX ORDER — BUMETANIDE 0.25 MG/ML
2 INJECTION, SOLUTION INTRAMUSCULAR; INTRAVENOUS ONCE
Status: COMPLETED | OUTPATIENT
Start: 2022-04-19 | End: 2022-04-19

## 2022-04-19 NOTE — PROGRESS NOTES
Significant Event - Fall Note    Date/Time of Fall: April 18, 2022 at 2345    Fall huddle completed: Yes    Description of patient fall:     Patient fell from: Toilet     Activity when fall occurred: Toileting-related activities     Where did fall occur: Bathroom     Was the fall assisted: Found on floor/unassisted to floor    Who witnessed the fall: Unwitnessed    Patient narrative of fall: Patient was trying to go to the bathroom by himself. Tele box falling from his pocket caused patient to fall. Son in the room. Son found patient on floor of bathroom. Staff narrative of fall: Unwitnessed fall. Responded immediately to son's request for help.     Name of Provider notified of fall: Dr. Jayy Hirsch notification: Family present    Factors contributing to fall:     Physical: Weakness/fatigue     Psychological: Alert     Environmental: none     Medications received in the past 8 hours:   Medication(s) Administered in past 8 Hours from 04/18/2022 1633 to 04/19/2022 0033     Date/Time Order Dose Route Action Action by Comments    04/18/2022 1831 carvedilol (COREG) tab 6.25 mg 6.25 mg Oral Given Gato Lawrence RN     04/18/2022 2208 hydrALAZINE (APRESOLINE) tab 100 mg 100 mg Oral Given Shawna Leyden, RN     04/18/2022 1826 Insulin Aspart Pen (NOVOLOG) 100 UNIT/ML flexpen 1-10 Units 7 Units Subcutaneous Given Gato Lawrence RN     04/18/2022 2208 Insulin Aspart Pen (NOVOLOG) 100 UNIT/ML flexpen 1-10 Units 8 Units Subcutaneous Given Shawna Leyden, RN     04/18/2022 2209 insulin detemir (LEVEMIR) 100 UNIT/ML flextouch 8 Units 8 Units Subcutaneous Given Shawna Leyden, RN           Was patient identified as high fall risk prior to fall:         Fall Risk Level: High Fall Risk                      What interventions were in place prior to fall: Bed in lowest position, Call light within reach, Low bed Bevelyn Fells only), [place] footwear, Patient/family involved in fall prevention plan, Patient situated close to nursing station, Personal items within reach, Reality orientation and Rounding    Interventions post fall: Bed alarm, Bed in lowest position, Call light within reach, Fall alert wristband, Low bed Rexene Rummage only), [place] footwear, Patient education tool, Patient/family involved in fall prevention plan, Patient situated close to nursing station, Personal items within reach, Reality orientation, Rounding, Signage in place and Toileting regimen    Additional comments:   CT scan of head/brain ordered. Result came back negative for traumatic brain injury. Patient remains asymptomatic.

## 2022-04-19 NOTE — PLAN OF CARE
Patient A&Ox4, reported no pain. BLE +3 edema. Spoke to pt's daughter about goals of care. SW spoke to daughter about setting up home health. Steroid discontinued per MD. Pt's DX=191, pagedanie GILMORE, Insulin given per [facility]. Pt had fall last night, PT ordered to reassess. Dietary consulted.

## 2022-04-20 VITALS
TEMPERATURE: 98 F | OXYGEN SATURATION: 96 % | BODY MASS INDEX: 28 KG/M2 | SYSTOLIC BLOOD PRESSURE: 120 MMHG | HEART RATE: 55 BPM | WEIGHT: 171 LBS | DIASTOLIC BLOOD PRESSURE: 49 MMHG | RESPIRATION RATE: 18 BRPM

## 2022-04-20 LAB
ANION GAP SERPL CALC-SCNC: 9 MMOL/L (ref 0–18)
BUN BLD-MCNC: 75 MG/DL (ref 7–18)
CALCIUM BLD-MCNC: 9.1 MG/DL (ref 8.5–10.1)
CHLORIDE SERPL-SCNC: 98 MMOL/L (ref 98–112)
CO2 SERPL-SCNC: 30 MMOL/L (ref 21–32)
CREAT BLD-MCNC: 2.01 MG/DL
GLUCOSE BLD-MCNC: 105 MG/DL (ref 70–99)
GLUCOSE BLD-MCNC: 123 MG/DL (ref 70–99)
GLUCOSE BLD-MCNC: 187 MG/DL (ref 70–99)
GLUCOSE BLD-MCNC: 227 MG/DL (ref 70–99)
GLUCOSE BLD-MCNC: 68 MG/DL (ref 70–99)
GLUCOSE BLD-MCNC: 68 MG/DL (ref 70–99)
GLUCOSE BLD-MCNC: 98 MG/DL (ref 70–99)
MAGNESIUM SERPL-MCNC: 2.3 MG/DL (ref 1.6–2.6)
OSMOLALITY SERPL CALC.SUM OF ELEC: 306 MOSM/KG (ref 275–295)
POTASSIUM SERPL-SCNC: 3.7 MMOL/L (ref 3.5–5.1)
SODIUM SERPL-SCNC: 137 MMOL/L (ref 136–145)

## 2022-04-20 RX ORDER — FUROSEMIDE 40 MG/1
40 TABLET ORAL DAILY
Qty: 30 TABLET | Refills: 5 | Status: SHIPPED | OUTPATIENT
Start: 2022-04-21

## 2022-04-20 RX ORDER — CARVEDILOL 6.25 MG/1
6.25 TABLET ORAL 2 TIMES DAILY WITH MEALS
Qty: 60 TABLET | Refills: 3 | Status: SHIPPED | OUTPATIENT
Start: 2022-04-20

## 2022-04-20 NOTE — PROGRESS NOTES
NURSING DISCHARGE NOTE    Discharged Home via Wheelchair. Accompanied by Family member  Belongings Taken by patient/family. Patient A&Ox4, reported no pain. AVS, home medications, and follow-up appts gone over w/pt, pt's son, and pt's daughter in law. PIV taken out, pt tolerated well.

## 2022-04-20 NOTE — PROGRESS NOTES
Pt A&Ox4 on room air, no complaints of pain. Glucose has been elevated due to being on steroid explained to pt son. Bilateral lower leg edema has improved. Spoke with son and he agrees that they are not swollen any more. Call light within reach, frequent checks made, son is going to be staying with patient overnight. This morning patients blood sugar was at 68, I administered 4 glucose tablets and it went up to 105, notified Dr. Willie Simon, she said to decrease levemir to 5 units.  Made changes

## 2022-04-20 NOTE — CM/SW NOTE
CM noted DC order and PT recommendation for home health. CM started aidin referral for home health and will follow up with patient with choice list and quality data.  &  to remain available and supportive for discharge planning needs.     Evens Christie RN Case Manager [phone]

## 2022-04-21 ENCOUNTER — TELEPHONE (OUTPATIENT)
Dept: FAMILY MEDICINE CLINIC | Facility: CLINIC | Age: 82
End: 2022-04-21

## 2022-04-21 ENCOUNTER — PATIENT OUTREACH (OUTPATIENT)
Dept: CASE MANAGEMENT | Age: 82
End: 2022-04-21

## 2022-04-21 PROCEDURE — 1111F DSCHRG MED/CURRENT MED MERGE: CPT

## 2022-04-21 NOTE — PAYOR COMM NOTE
--------------  DISCHARGE REVIEW    Payor: Shubham Carl #:  [id number]  Authorization Number: Cloteal Brock date: 4/16/22  Admit time:   2:04 AM  Discharge Date: 4/20/2022  4:42 PM     Admitting Physician: Timothy Martin DO  Attending Physician:  No att. providers found  Primary Care Physician: Carli Joy MD       Discharge Summary Notes    No notes of this type exist for this encounter.          REVIEWER COMMENTS

## 2022-04-21 NOTE — TELEPHONE ENCOUNTER
Lizbet calling as this writer was calling her, Jada Nagy states she is concerned with pt's BP fluctuation. Jada Nagy states pt is not taking any medication for his Afib due to his liver and she cannot remember if the Afib causes BP or HR fluctuations. Advised Lizbet that Afib is a rapid irregular HR and discussed symptoms, Jada Nagy denied any symptoms and states pt pulse is fine. Reviewed VS flowsheet from inpatient stay and advised Jada Nagy that pt had some BP fluctuation documented as well, verbalized understanding. Also discussed message below regarding [place], advised that pt will need an in-person appointment to have [place] services initiated so at [facility] this can be discussed and ordered, Jada Nagy verbalized understanding.

## 2022-04-21 NOTE — TELEPHONE ENCOUNTER
Pts daughter in law states that his blood pressure is fluctuating-goes up and then goes down, they are checking hourly, and would like to know if this is a sign of a fib? At 1230 pm was 127/62 and at 130 pm was 105/54.

## 2022-04-21 NOTE — TELEPHONE ENCOUNTER
NCM spoke with daughter in law, Cecilio Lorenzo (ok per HIPAA) today for HFU. He has appt with PCP on 4-25-22. While on the phone, Cecilio Lorenzo reports patient has had severe weakness and low BP since discharge. Patient was not weak prior to admission. His BP at 6 am today was 102/55, at 8:15 am it was 107/49, at 10 am it was 97/53 and while on the phone at 11:45 am it was 118/62, HR 57. NCM advised patient to closely monitor today and if it worsens, advised patient should be evaluated at either IC/ER. She verbalized understanding. She also plans to have patient stay at her house so she can keep a closer eye on him. Patient is not able to sit down on the toilet due to his weakness either so he hasn't had a BM since being home. Denies shortness of breath, cough, wheezing, chest pain, dizziness or lightheadedness or vision changes. Denies any fever/chills, nausea/vomiting/diarrhea or constipation. Blood sugar was 150 this morning. Aware to monitor. She would like to discuss ordering [facility] PT for patient and believes patient just needs to build up his strength from being in the hospital.  She is aware if/when patient should return to ER and NCM reviewed ER warning signs. Please advise and inform Cecilio Lorenzo if [facility] can be ordered now or if discussion at appt is needed first, thank you!

## 2022-04-25 ENCOUNTER — TELEPHONE (OUTPATIENT)
Dept: FAMILY MEDICINE CLINIC | Facility: CLINIC | Age: 82
End: 2022-04-25

## 2022-04-25 NOTE — TELEPHONE ENCOUNTER
Spoke with Bri Camp and reiterated per our previous conversation pt will need [facility] to be authorized by insurance, referral will not be authorized with supporting OV note documentation. Offered appointment for Wednesday afternoon, Bri Camp states she needs to call cardiology and then she will call back.

## 2022-04-25 NOTE — TELEPHONE ENCOUNTER
Pts daugther in law is wondering if  can put orders in for PT and or home care nurse. She states he had appointment today but cannot make it. She rescheduled for next week. She wants to know does she have to have him seen before dr will put in orders, or can he put in orders so the process doesn't take longer getting authorized? She would like a all back.

## 2022-04-28 ENCOUNTER — LAB ENCOUNTER (OUTPATIENT)
Dept: LAB | Facility: HOSPITAL | Age: 82
End: 2022-04-28
Attending: INTERNAL MEDICINE
Payer: MEDICAID

## 2022-04-28 DIAGNOSIS — R17 JAUNDICE: Primary | ICD-10-CM

## 2022-04-28 LAB
ALBUMIN SERPL-MCNC: 2.9 G/DL (ref 3.4–5)
ALBUMIN/GLOB SERPL: 0.9 {RATIO} (ref 1–2)
ALP LIVER SERPL-CCNC: 420 U/L
ALT SERPL-CCNC: 88 U/L
ANION GAP SERPL CALC-SCNC: 3 MMOL/L (ref 0–18)
AST SERPL-CCNC: 85 U/L (ref 15–37)
BILIRUB SERPL-MCNC: 2.2 MG/DL (ref 0.1–2)
BUN BLD-MCNC: 26 MG/DL (ref 7–18)
CALCIUM BLD-MCNC: 8.3 MG/DL (ref 8.5–10.1)
CHLORIDE SERPL-SCNC: 105 MMOL/L (ref 98–112)
CO2 SERPL-SCNC: 27 MMOL/L (ref 21–32)
CREAT BLD-MCNC: 1.42 MG/DL
FASTING STATUS PATIENT QL REPORTED: NO
GLOBULIN PLAS-MCNC: 3.1 G/DL (ref 2.8–4.4)
GLUCOSE BLD-MCNC: 228 MG/DL (ref 70–99)
OSMOLALITY SERPL CALC.SUM OF ELEC: 292 MOSM/KG (ref 275–295)
POTASSIUM SERPL-SCNC: 5 MMOL/L (ref 3.5–5.1)
PROT SERPL-MCNC: 6 G/DL (ref 6.4–8.2)
SODIUM SERPL-SCNC: 135 MMOL/L (ref 136–145)

## 2022-04-28 PROCEDURE — 80053 COMPREHEN METABOLIC PANEL: CPT

## 2022-04-28 PROCEDURE — 36415 COLL VENOUS BLD VENIPUNCTURE: CPT

## 2022-05-06 ENCOUNTER — OFFICE VISIT (OUTPATIENT)
Dept: FAMILY MEDICINE CLINIC | Facility: CLINIC | Age: 82
End: 2022-05-06
Payer: MEDICAID

## 2022-05-06 VITALS
OXYGEN SATURATION: 99 % | BODY MASS INDEX: 26.06 KG/M2 | WEIGHT: 162.13 LBS | HEART RATE: 68 BPM | SYSTOLIC BLOOD PRESSURE: 158 MMHG | RESPIRATION RATE: 18 BRPM | HEIGHT: 66 IN | DIASTOLIC BLOOD PRESSURE: 72 MMHG | TEMPERATURE: 97 F

## 2022-05-06 DIAGNOSIS — E11.69 DIABETES MELLITUS TYPE 2 IN OBESE (HCC): ICD-10-CM

## 2022-05-06 DIAGNOSIS — E87.5 HYPERKALEMIA: ICD-10-CM

## 2022-05-06 DIAGNOSIS — K74.60 HEPATIC CIRRHOSIS, UNSPECIFIED HEPATIC CIRRHOSIS TYPE, UNSPECIFIED WHETHER ASCITES PRESENT (HCC): ICD-10-CM

## 2022-05-06 DIAGNOSIS — R53.1 GENERALIZED WEAKNESS: ICD-10-CM

## 2022-05-06 DIAGNOSIS — N17.9 ACUTE KIDNEY INJURY (HCC): Primary | ICD-10-CM

## 2022-05-06 DIAGNOSIS — E87.1 HYPONATREMIA: ICD-10-CM

## 2022-05-06 DIAGNOSIS — E66.9 DIABETES MELLITUS TYPE 2 IN OBESE (HCC): ICD-10-CM

## 2022-05-06 PROCEDURE — 1111F DSCHRG MED/CURRENT MED MERGE: CPT | Performed by: FAMILY MEDICINE

## 2022-05-06 PROCEDURE — 3008F BODY MASS INDEX DOCD: CPT | Performed by: FAMILY MEDICINE

## 2022-05-06 PROCEDURE — 99215 OFFICE O/P EST HI 40 MIN: CPT | Performed by: FAMILY MEDICINE

## 2022-05-06 PROCEDURE — 3078F DIAST BP <80 MM HG: CPT | Performed by: FAMILY MEDICINE

## 2022-05-06 PROCEDURE — 3077F SYST BP >= 140 MM HG: CPT | Performed by: FAMILY MEDICINE

## 2022-05-16 ENCOUNTER — TELEPHONE (OUTPATIENT)
Dept: SURGERY | Facility: HOSPITAL | Age: 82
End: 2022-05-16

## 2022-05-19 ENCOUNTER — TELEPHONE (OUTPATIENT)
Dept: FAMILY MEDICINE CLINIC | Facility: CLINIC | Age: 82
End: 2022-05-19

## 2022-05-27 NOTE — TELEPHONE ENCOUNTER
Addended by: Paris Garner on: 5/27/2022 02:35 PM     Modules accepted: Orders Left detailed message on daughter's vm letting her know pt should schedule a hospital f/u with cardiology. She was asked to [facility] if she has any further questions.

## 2022-06-11 ENCOUNTER — LAB ENCOUNTER (OUTPATIENT)
Dept: LAB | Facility: HOSPITAL | Age: 82
End: 2022-06-11
Attending: SPEECH-LANGUAGE PATHOLOGIST
Payer: MEDICAID

## 2022-06-11 DIAGNOSIS — R74.01 NONSPECIFIC ELEVATION OF LEVELS OF TRANSAMINASE OR LACTIC ACID DEHYDROGENASE (LDH): ICD-10-CM

## 2022-06-11 DIAGNOSIS — R74.02 NONSPECIFIC ELEVATION OF LEVELS OF TRANSAMINASE OR LACTIC ACID DEHYDROGENASE (LDH): ICD-10-CM

## 2022-06-11 DIAGNOSIS — E78.00 PURE HYPERCHOLESTEROLEMIA: ICD-10-CM

## 2022-06-11 DIAGNOSIS — E87.5 HYPERKALEMIA: ICD-10-CM

## 2022-06-11 DIAGNOSIS — R79.89 ELEVATED LFTS: ICD-10-CM

## 2022-06-11 DIAGNOSIS — I10 HTN (HYPERTENSION): ICD-10-CM

## 2022-06-11 DIAGNOSIS — E11.9 DM2 (DIABETES MELLITUS, TYPE 2) (HCC): ICD-10-CM

## 2022-06-11 DIAGNOSIS — I25.10 CAD (CORONARY ARTERY DISEASE): ICD-10-CM

## 2022-06-11 DIAGNOSIS — I48.91 A-FIB (HCC): Primary | ICD-10-CM

## 2022-06-11 LAB
ALBUMIN SERPL-MCNC: 3.3 G/DL (ref 3.4–5)
ALBUMIN/GLOB SERPL: 0.9 {RATIO} (ref 1–2)
ALP LIVER SERPL-CCNC: 133 U/L
ALT SERPL-CCNC: 45 U/L
ANION GAP SERPL CALC-SCNC: 4 MMOL/L (ref 0–18)
AST SERPL-CCNC: 58 U/L (ref 15–37)
BASOPHILS # BLD AUTO: 0.02 X10(3) UL (ref 0–0.2)
BASOPHILS NFR BLD AUTO: 0.4 %
BILIRUB SERPL-MCNC: 0.8 MG/DL (ref 0.1–2)
BUN BLD-MCNC: 28 MG/DL (ref 7–18)
CALCIUM BLD-MCNC: 9 MG/DL (ref 8.5–10.1)
CHLORIDE SERPL-SCNC: 106 MMOL/L (ref 98–112)
CHOLEST SERPL-MCNC: 191 MG/DL (ref ?–200)
CO2 SERPL-SCNC: 27 MMOL/L (ref 21–32)
CREAT BLD-MCNC: 1.61 MG/DL
EOSINOPHIL # BLD AUTO: 0.02 X10(3) UL (ref 0–0.7)
EOSINOPHIL NFR BLD AUTO: 0.4 %
ERYTHROCYTE [DISTWIDTH] IN BLOOD BY AUTOMATED COUNT: 15.6 %
EST. AVERAGE GLUCOSE BLD GHB EST-MCNC: 157 MG/DL (ref 68–126)
FASTING PATIENT LIPID ANSWER: YES
FASTING STATUS PATIENT QL REPORTED: YES
GLOBULIN PLAS-MCNC: 3.8 G/DL (ref 2.8–4.4)
GLUCOSE BLD-MCNC: 155 MG/DL (ref 70–99)
HBA1C MFR BLD: 7.1 % (ref ?–5.7)
HCT VFR BLD AUTO: 33.5 %
HDLC SERPL-MCNC: 31 MG/DL (ref 40–59)
HGB BLD-MCNC: 10.3 G/DL
IMM GRANULOCYTES # BLD AUTO: 0.01 X10(3) UL (ref 0–1)
IMM GRANULOCYTES NFR BLD: 0.2 %
LDLC SERPL CALC-MCNC: 130 MG/DL (ref ?–100)
LYMPHOCYTES # BLD AUTO: 1.86 X10(3) UL (ref 1–4)
LYMPHOCYTES NFR BLD AUTO: 38.1 %
MCH RBC QN AUTO: 22.4 PG (ref 26–34)
MCHC RBC AUTO-ENTMCNC: 30.7 G/DL (ref 31–37)
MCV RBC AUTO: 72.8 FL
MONOCYTES # BLD AUTO: 0.56 X10(3) UL (ref 0.1–1)
MONOCYTES NFR BLD AUTO: 11.5 %
NEUTROPHILS # BLD AUTO: 2.41 X10 (3) UL (ref 1.5–7.7)
NEUTROPHILS # BLD AUTO: 2.41 X10(3) UL (ref 1.5–7.7)
NEUTROPHILS NFR BLD AUTO: 49.4 %
NONHDLC SERPL-MCNC: 160 MG/DL (ref ?–130)
OSMOLALITY SERPL CALC.SUM OF ELEC: 293 MOSM/KG (ref 275–295)
PLATELET # BLD AUTO: 110 10(3)UL (ref 150–450)
POTASSIUM SERPL-SCNC: 5.1 MMOL/L (ref 3.5–5.1)
PROT SERPL-MCNC: 7.1 G/DL (ref 6.4–8.2)
RBC # BLD AUTO: 4.6 X10(6)UL
SODIUM SERPL-SCNC: 137 MMOL/L (ref 136–145)
TRIGL SERPL-MCNC: 165 MG/DL (ref 30–149)
TSI SER-ACNC: 4.71 MIU/ML (ref 0.36–3.74)
VLDLC SERPL CALC-MCNC: 30 MG/DL (ref 0–30)
WBC # BLD AUTO: 4.9 X10(3) UL (ref 4–11)

## 2022-06-11 PROCEDURE — 80053 COMPREHEN METABOLIC PANEL: CPT

## 2022-06-11 PROCEDURE — 84443 ASSAY THYROID STIM HORMONE: CPT

## 2022-06-11 PROCEDURE — 83036 HEMOGLOBIN GLYCOSYLATED A1C: CPT

## 2022-06-11 PROCEDURE — 36415 COLL VENOUS BLD VENIPUNCTURE: CPT

## 2022-06-11 PROCEDURE — 80061 LIPID PANEL: CPT

## 2022-06-11 PROCEDURE — 85025 COMPLETE CBC W/AUTO DIFF WBC: CPT

## 2022-07-01 ENCOUNTER — LAB ENCOUNTER (OUTPATIENT)
Dept: LAB | Facility: HOSPITAL | Age: 82
End: 2022-07-01
Attending: NURSE PRACTITIONER
Payer: MEDICAID

## 2022-07-01 DIAGNOSIS — D64.9 ANEMIA, UNSPECIFIED: Primary | ICD-10-CM

## 2022-07-01 DIAGNOSIS — E03.9 MYXEDEMA HEART DISEASE: ICD-10-CM

## 2022-07-01 DIAGNOSIS — I51.9 MYXEDEMA HEART DISEASE: ICD-10-CM

## 2022-07-01 LAB
DEPRECATED HBV CORE AB SER IA-ACNC: 157 NG/ML
IRON SATN MFR SERPL: 24 %
IRON SERPL-MCNC: 90 UG/DL
TIBC SERPL-MCNC: 375 UG/DL (ref 240–450)
TRANSFERRIN SERPL-MCNC: 252 MG/DL (ref 200–360)

## 2022-07-01 PROCEDURE — 83550 IRON BINDING TEST: CPT

## 2022-07-01 PROCEDURE — 82728 ASSAY OF FERRITIN: CPT

## 2022-07-01 PROCEDURE — 36415 COLL VENOUS BLD VENIPUNCTURE: CPT

## 2022-07-01 PROCEDURE — 83540 ASSAY OF IRON: CPT

## 2022-07-14 RX ORDER — BLOOD SUGAR DIAGNOSTIC
STRIP MISCELLANEOUS
Qty: 400 STRIP | Refills: 2 | Status: SHIPPED | OUTPATIENT
Start: 2022-07-14

## 2022-07-19 ENCOUNTER — LAB ENCOUNTER (OUTPATIENT)
Dept: LAB | Facility: HOSPITAL | Age: 82
End: 2022-07-19
Attending: NURSE PRACTITIONER
Payer: MEDICAID

## 2022-07-19 DIAGNOSIS — E03.9 MYXEDEMA HEART DISEASE: ICD-10-CM

## 2022-07-19 DIAGNOSIS — D64.9 ANEMIA, UNSPECIFIED: Primary | ICD-10-CM

## 2022-07-19 DIAGNOSIS — I51.9 MYXEDEMA HEART DISEASE: ICD-10-CM

## 2022-07-19 LAB
DEPRECATED HBV CORE AB SER IA-ACNC: 133.7 NG/ML
IRON SATN MFR SERPL: 23 %
IRON SERPL-MCNC: 82 UG/DL
T4 FREE SERPL-MCNC: 1.2 NG/DL (ref 0.8–1.7)
TIBC SERPL-MCNC: 361 UG/DL (ref 240–450)
TRANSFERRIN SERPL-MCNC: 242 MG/DL (ref 200–360)

## 2022-07-19 PROCEDURE — 36415 COLL VENOUS BLD VENIPUNCTURE: CPT

## 2022-07-19 PROCEDURE — 84439 ASSAY OF FREE THYROXINE: CPT

## 2022-07-19 PROCEDURE — 82728 ASSAY OF FERRITIN: CPT

## 2022-07-19 PROCEDURE — 83540 ASSAY OF IRON: CPT

## 2022-07-19 PROCEDURE — 83550 IRON BINDING TEST: CPT

## 2022-08-04 PROBLEM — E87.2 METABOLIC ACIDOSIS: Status: RESOLVED | Noted: 2022-04-15 | Resolved: 2022-08-04

## 2022-08-04 PROBLEM — R73.9 HYPERGLYCEMIA: Status: RESOLVED | Noted: 2022-04-15 | Resolved: 2022-08-04

## 2022-08-04 PROBLEM — M54.6 ACUTE LEFT-SIDED THORACIC BACK PAIN: Status: RESOLVED | Noted: 2020-02-18 | Resolved: 2022-08-04

## 2022-08-04 PROBLEM — D64.9 ANEMIA, UNSPECIFIED TYPE: Status: RESOLVED | Noted: 2022-04-16 | Resolved: 2022-08-04

## 2022-08-04 PROBLEM — R60.9 EDEMA: Status: RESOLVED | Noted: 2017-12-07 | Resolved: 2022-08-04

## 2022-08-04 PROBLEM — N17.9 ACUTE KIDNEY INJURY: Status: RESOLVED | Noted: 2022-04-15 | Resolved: 2022-08-04

## 2022-08-04 PROBLEM — E87.1 HYPONATREMIA: Status: RESOLVED | Noted: 2022-04-15 | Resolved: 2022-08-04

## 2022-08-04 PROBLEM — N17.9 ACUTE KIDNEY INJURY (HCC): Status: RESOLVED | Noted: 2022-04-15 | Resolved: 2022-08-04

## 2022-08-04 PROBLEM — E87.5 HYPERKALEMIA: Status: RESOLVED | Noted: 2022-04-15 | Resolved: 2022-08-04

## 2022-08-04 PROBLEM — E87.20 METABOLIC ACIDOSIS: Status: RESOLVED | Noted: 2022-04-15 | Resolved: 2022-08-04

## 2022-09-06 ENCOUNTER — ORDER TRANSCRIPTION (OUTPATIENT)
Dept: ADMINISTRATIVE | Facility: HOSPITAL | Age: 82
End: 2022-09-06

## 2022-09-06 ENCOUNTER — LAB ENCOUNTER (OUTPATIENT)
Dept: LAB | Facility: HOSPITAL | Age: 82
End: 2022-09-06
Attending: INTERNAL MEDICINE
Payer: MEDICAID

## 2022-09-06 ENCOUNTER — TELEPHONE (OUTPATIENT)
Dept: SURGERY | Facility: HOSPITAL | Age: 82
End: 2022-09-06

## 2022-09-06 ENCOUNTER — HOSPITAL ENCOUNTER (OUTPATIENT)
Dept: GENERAL RADIOLOGY | Facility: HOSPITAL | Age: 82
Discharge: HOME | End: 2022-09-06
Attending: INTERNAL MEDICINE
Payer: MEDICAID

## 2022-09-06 DIAGNOSIS — Z01.812 ENCOUNTER FOR PREPROCEDURE SCREENING LABORATORY TESTING FOR COVID-19: ICD-10-CM

## 2022-09-06 DIAGNOSIS — Z01.812 ENCOUNTER FOR PREPROCEDURE SCREENING LABORATORY TESTING FOR COVID-19: Primary | ICD-10-CM

## 2022-09-06 DIAGNOSIS — R74.02 NONSPECIFIC ELEVATION OF LEVELS OF TRANSAMINASE OR LACTIC ACID DEHYDROGENASE (LDH): ICD-10-CM

## 2022-09-06 DIAGNOSIS — Z01.818 PRE-OP TESTING: Primary | ICD-10-CM

## 2022-09-06 DIAGNOSIS — Z20.822 ENCOUNTER FOR PREPROCEDURE SCREENING LABORATORY TESTING FOR COVID-19: ICD-10-CM

## 2022-09-06 DIAGNOSIS — R63.4 LOSS OF WEIGHT: ICD-10-CM

## 2022-09-06 DIAGNOSIS — Z01.811 PRE-OP CHEST EXAM: ICD-10-CM

## 2022-09-06 DIAGNOSIS — R74.01 NONSPECIFIC ELEVATION OF LEVELS OF TRANSAMINASE OR LACTIC ACID DEHYDROGENASE (LDH): ICD-10-CM

## 2022-09-06 DIAGNOSIS — R74.02 NONSPECIFIC ELEVATION OF LEVELS OF TRANSAMINASE OR LACTIC ACID DEHYDROGENASE (LDH): Primary | ICD-10-CM

## 2022-09-06 DIAGNOSIS — Z20.822 ENCOUNTER FOR PREPROCEDURE SCREENING LABORATORY TESTING FOR COVID-19: Primary | ICD-10-CM

## 2022-09-06 DIAGNOSIS — R74.01 NONSPECIFIC ELEVATION OF LEVELS OF TRANSAMINASE OR LACTIC ACID DEHYDROGENASE (LDH): Primary | ICD-10-CM

## 2022-09-06 LAB
ALBUMIN SERPL-MCNC: 3.4 G/DL (ref 3.4–5)
ALP LIVER SERPL-CCNC: 97 U/L
ALT SERPL-CCNC: 40 U/L
ANION GAP SERPL CALC-SCNC: 4 MMOL/L (ref 0–18)
AST SERPL-CCNC: 39 U/L (ref 15–37)
BASOPHILS # BLD AUTO: 0.02 X10(3) UL (ref 0–0.2)
BASOPHILS NFR BLD AUTO: 0.3 %
BILIRUB DIRECT SERPL-MCNC: 0.2 MG/DL (ref 0–0.2)
BILIRUB SERPL-MCNC: 0.6 MG/DL (ref 0.1–2)
BUN BLD-MCNC: 38 MG/DL (ref 7–18)
CALCIUM BLD-MCNC: 9.7 MG/DL (ref 8.5–10.1)
CHLORIDE SERPL-SCNC: 107 MMOL/L (ref 98–112)
CO2 SERPL-SCNC: 25 MMOL/L (ref 21–32)
CREAT BLD-MCNC: 1.72 MG/DL
EOSINOPHIL # BLD AUTO: 0.02 X10(3) UL (ref 0–0.7)
EOSINOPHIL NFR BLD AUTO: 0.3 %
ERYTHROCYTE [DISTWIDTH] IN BLOOD BY AUTOMATED COUNT: 17.2 %
FASTING STATUS PATIENT QL REPORTED: YES
GFR SERPLBLD BASED ON 1.73 SQ M-ARVRAT: 39 ML/MIN/1.73M2 (ref 60–?)
GLUCOSE BLD-MCNC: 323 MG/DL (ref 70–99)
HCT VFR BLD AUTO: 35.4 %
HGB BLD-MCNC: 11 G/DL
IMM GRANULOCYTES # BLD AUTO: 0.01 X10(3) UL (ref 0–1)
IMM GRANULOCYTES NFR BLD: 0.2 %
LYMPHOCYTES # BLD AUTO: 2.04 X10(3) UL (ref 1–4)
LYMPHOCYTES NFR BLD AUTO: 35.2 %
MCH RBC QN AUTO: 20.8 PG (ref 26–34)
MCHC RBC AUTO-ENTMCNC: 31.1 G/DL (ref 31–37)
MCV RBC AUTO: 66.9 FL
MONOCYTES # BLD AUTO: 0.49 X10(3) UL (ref 0.1–1)
MONOCYTES NFR BLD AUTO: 8.5 %
NEUTROPHILS # BLD AUTO: 3.21 X10 (3) UL (ref 1.5–7.7)
NEUTROPHILS # BLD AUTO: 3.21 X10(3) UL (ref 1.5–7.7)
NEUTROPHILS NFR BLD AUTO: 55.5 %
OSMOLALITY SERPL CALC.SUM OF ELEC: 304 MOSM/KG (ref 275–295)
PLATELET # BLD AUTO: 105 10(3)UL (ref 150–450)
POTASSIUM SERPL-SCNC: 5.1 MMOL/L (ref 3.5–5.1)
PROT SERPL-MCNC: 7.3 G/DL (ref 6.4–8.2)
RBC # BLD AUTO: 5.29 X10(6)UL
SODIUM SERPL-SCNC: 136 MMOL/L (ref 136–145)
WBC # BLD AUTO: 5.8 X10(3) UL (ref 4–11)

## 2022-09-06 PROCEDURE — 80048 BASIC METABOLIC PNL TOTAL CA: CPT

## 2022-09-06 PROCEDURE — 36415 COLL VENOUS BLD VENIPUNCTURE: CPT

## 2022-09-06 PROCEDURE — 80076 HEPATIC FUNCTION PANEL: CPT

## 2022-09-06 PROCEDURE — 85025 COMPLETE CBC W/AUTO DIFF WBC: CPT

## 2022-09-06 PROCEDURE — 71046 X-RAY EXAM CHEST 2 VIEWS: CPT | Performed by: INTERNAL MEDICINE

## 2022-09-06 NOTE — TELEPHONE ENCOUNTER
ASSESSMENT AND PLAN:  Yosef Cisneros is a 81year old male with weight loss. No abdominal pain symptoms or other localizing symptoms. Eval neg to date per HPI. EGD colon CT MRI labs normal. Thyroid normal, normal Fe. Hx DM with concern for diet intake as a result. No role for repeat eval    Hx abn LFTS likely drug related DILI. Need repeat labs. 1.  Check hepatic function panel. 2.  Nutrition consult.

## 2022-09-07 LAB — SARS-COV-2 RNA RESP QL NAA+PROBE: NOT DETECTED

## 2022-09-08 ENCOUNTER — HOSPITAL ENCOUNTER (OUTPATIENT)
Dept: INTERVENTIONAL RADIOLOGY/VASCULAR | Facility: HOSPITAL | Age: 82
Discharge: HOME | End: 2022-09-08
Attending: INTERNAL MEDICINE | Admitting: INTERNAL MEDICINE
Payer: MEDICAID

## 2022-09-08 VITALS
BODY MASS INDEX: 25.07 KG/M2 | TEMPERATURE: 97 F | HEART RATE: 58 BPM | OXYGEN SATURATION: 100 % | WEIGHT: 156 LBS | RESPIRATION RATE: 19 BRPM | DIASTOLIC BLOOD PRESSURE: 61 MMHG | SYSTOLIC BLOOD PRESSURE: 149 MMHG | HEIGHT: 66 IN

## 2022-09-08 DIAGNOSIS — I10 HTN (HYPERTENSION): ICD-10-CM

## 2022-09-08 DIAGNOSIS — I25.10 CAD (CORONARY ARTERY DISEASE): ICD-10-CM

## 2022-09-08 DIAGNOSIS — E78.5 DYSLIPIDEMIA: ICD-10-CM

## 2022-09-08 DIAGNOSIS — R94.39 ABNORMAL STRESS TEST: ICD-10-CM

## 2022-09-08 PROCEDURE — 99153 MOD SED SAME PHYS/QHP EA: CPT | Performed by: INTERNAL MEDICINE

## 2022-09-08 PROCEDURE — 99152 MOD SED SAME PHYS/QHP 5/>YRS: CPT | Performed by: INTERNAL MEDICINE

## 2022-09-08 PROCEDURE — 93458 L HRT ARTERY/VENTRICLE ANGIO: CPT | Performed by: INTERNAL MEDICINE

## 2022-09-08 PROCEDURE — 4A023N7 MEASUREMENT OF CARDIAC SAMPLING AND PRESSURE, LEFT HEART, PERCUTANEOUS APPROACH: ICD-10-PCS | Performed by: INTERNAL MEDICINE

## 2022-09-08 PROCEDURE — B211YZZ FLUOROSCOPY OF MULTIPLE CORONARY ARTERIES USING OTHER CONTRAST: ICD-10-PCS | Performed by: INTERNAL MEDICINE

## 2022-09-08 RX ORDER — HEPARIN SODIUM 5000 [USP'U]/ML
INJECTION, SOLUTION INTRAVENOUS; SUBCUTANEOUS
Status: COMPLETED
Start: 2022-09-08 | End: 2022-09-08

## 2022-09-08 RX ORDER — HYDRALAZINE HYDROCHLORIDE 20 MG/ML
INJECTION INTRAMUSCULAR; INTRAVENOUS
Status: COMPLETED
Start: 2022-09-08 | End: 2022-09-08

## 2022-09-08 RX ORDER — IODIXANOL 320 MG/ML
100 INJECTION, SOLUTION INTRAVASCULAR
Status: COMPLETED | OUTPATIENT
Start: 2022-09-08 | End: 2022-09-08

## 2022-09-08 RX ORDER — MIDAZOLAM HYDROCHLORIDE 1 MG/ML
INJECTION INTRAMUSCULAR; INTRAVENOUS
Status: COMPLETED
Start: 2022-09-08 | End: 2022-09-08

## 2022-09-08 RX ORDER — SODIUM CHLORIDE 9 MG/ML
INJECTION, SOLUTION INTRAVENOUS
Status: DISCONTINUED | OUTPATIENT
Start: 2022-09-09 | End: 2022-09-08 | Stop reason: HOSPADM

## 2022-09-08 RX ORDER — LIDOCAINE HYDROCHLORIDE 10 MG/ML
INJECTION, SOLUTION EPIDURAL; INFILTRATION; INTRACAUDAL; PERINEURAL
Status: COMPLETED
Start: 2022-09-08 | End: 2022-09-08

## 2022-09-08 RX ADMIN — IODIXANOL 70 ML: 320 INJECTION, SOLUTION INTRAVASCULAR at 13:08:00

## 2022-09-08 NOTE — PROGRESS NOTES
Patient received back from cath lab at 1315. Right groin site with dressing in place, CDI, soft. Dr Hamlet Walden here to speak with patient and his family at the bedside and over the phone. After an hour, able to raise HOB without issue. Patient tolerating PO intake. Discharge instructions discussed with patient and his son, with patients son as an , per their request. Questions and concerns addressed. Patient to restart Xarelto tomorrow. After required bedrest and recovery, patient able to void in the bathroom and ambulate in the hallway without difficulty. Groin site stable. PIV removed intact. Patient escorted out via wheelchair, with belongings. VSS. Patients son, Carlos,is driving him home.

## 2022-09-08 NOTE — PROCEDURES
[address]    PATIENT'S NAME: Ashlyn DIAZ Gregoria More PHYSICIAN: Meghana Randle. Diann Bravo M.D. OPERATING PHYSICIAN: Kassandra Vidal M.D. PATIENT ACCOUNT#:   [de-identified]    LOCATION:  [address]  MEDICAL RECORD #:   [id number]       YOB: 1940  ADMISSION DATE:       09/08/2022      OPERATION DATE:  09/08/2022    CARDIAC PROCEDURE TRANSCRIPTION      CARDIAC CATHETERIZATION    PREOPERATIVE DIAGNOSIS:    1.   Borderline abnormal stress test.  2.   History of coronary artery disease, status post multiple percutaneous coronary interventions. 3.   Chronic renal insufficiency. 4.   Hypertension. POSTOPERATIVE DIAGNOSIS:    1. Patent stents. 2.   Coronary artery disease. PROCEDURE PERFORMED:      CLINICAL HISTORY:  The patient was referred for angiography. The patient had a stress test that suggests a question of ischemia in the apical territory. The patient was without symptoms. There was a detailed discussion of the family with risks, benefits and alternatives of the procedure. SEDATION:  The patient received conscious sedation. This was monitored by myself and the catheterization lab staff. This started at 1235 and ended at 0. DESCRIPTION OF PROCEDURE:  After informed consent was obtained, the patient was prepped and draped in usual sterile fashion. Using ultrasound, the right common femoral artery was identified and entered under direct visualization using a micropuncture needle. A micropuncture sheath followed, followed by placement of a 6-Trinidadian sheath. Kennedy left, pigtail, and AL1 catheters were used for the diagnostic procedure. At the end of the case, a 6-Trinidadian Angio-Seal was used to seal the right common femoral artery site. There were no apparent acute complications noted, and the patient tolerated the procedure well. CORONARIES:  Injection of the left main coronary artery did not reveal significant disease of the left main.   Left main trifurcates to the left anterior descending, circumflex, and ramus intermedius branches. The left anterior descending artery at its ostium was very difficult to discern in any of the caudal views as there was overlap with the ramus intermedius branch. However, based on all cranial views, there is an ostial stenosis of the LAD of about 30% or so. It is heavily calcified. There is a dilated segment. This is followed by another area of mild stenosis of about 20% to 30% followed by another area of stenosis of about 30% to 40%. Mild diffuse disease is noted in the apical LAD. The ramus intermedius is noted to have a stent into the very proximal section extending to the ostium. I am unable to really see the ostium perfectly clearly yet, there is normal flow in the vessel. There is a 50% stenosis in the small vessel in its midsection. The circumflex coronary artery is stented. There is minimal in-stent restenosis of about 10% to 20%. The stent appeared extend from the mid circumflex into a bifurcating obtuse marginal branch. There appears to probably be a crush technique used in this stent. I do not see any major in-stent restenosis. The right coronary artery was entered best at previously noted using an AL1 catheter. Multiple layers of stents are noted within this proximal into the mid to distal right coronary artery. The very midportion of the stent has a stenosis of about 30%. This is a fairly small vessel. Distally, it gives rise to essentially one posterior descending artery which is free of significant disease. VENTRICULOGRAPHY:  Ventriculography was not performed due to underlying renal insufficiency. Left ventricular end-diastolic pressure was measured about 15 mmHg. No significant gradient was pulled back across the aortic valve. SUMMARY:    1.   Mild-to-moderate coronary disease involving the LAD.   2.   Patent stents as described above with minimal in-stent restenosis. RECOMMENDATIONS:  Risk factor modification and aggressive medical therapy.     Dictated By Edmundo Zeng M.D.  d: 09/08/2022 13:11:10  t: 09/08/2022 17:38:46  [facility] [id number]  OU/

## 2022-10-17 RX ORDER — GLIPIZIDE 2.5 MG/1
TABLET, EXTENDED RELEASE ORAL
Qty: 180 TABLET | Refills: 1 | Status: SHIPPED | OUTPATIENT
Start: 2022-10-17 | End: 2023-03-06

## 2022-10-31 RX ORDER — FUROSEMIDE 40 MG/1
40 TABLET ORAL DAILY
Qty: 90 TABLET | Refills: 1 | Status: SHIPPED | OUTPATIENT
Start: 2022-10-31

## 2022-12-10 ENCOUNTER — LAB ENCOUNTER (OUTPATIENT)
Dept: LAB | Facility: HOSPITAL | Age: 82
End: 2022-12-10
Attending: INTERNAL MEDICINE
Payer: MEDICAID

## 2022-12-10 DIAGNOSIS — E87.5 HYPERKALEMIA: ICD-10-CM

## 2022-12-10 DIAGNOSIS — I10 ESSENTIAL HYPERTENSION, BENIGN: ICD-10-CM

## 2022-12-10 DIAGNOSIS — I48.0 PAROXYSMAL ATRIAL FIBRILLATION (HCC): ICD-10-CM

## 2022-12-10 DIAGNOSIS — I65.23 BILATERAL CAROTID ARTERY STENOSIS: ICD-10-CM

## 2022-12-10 DIAGNOSIS — D64.9 ANEMIA, UNSPECIFIED: ICD-10-CM

## 2022-12-10 DIAGNOSIS — I25.10 CORONARY ATHEROSCLEROSIS OF NATIVE CORONARY ARTERY: ICD-10-CM

## 2022-12-10 DIAGNOSIS — R79.89 ELEVATED LFTS: ICD-10-CM

## 2022-12-10 DIAGNOSIS — E03.9 HYPOTHYROIDISM: Primary | ICD-10-CM

## 2022-12-10 DIAGNOSIS — R60.9 EDEMA: ICD-10-CM

## 2022-12-10 DIAGNOSIS — E11.9 DM TYPE 2 (DIABETES MELLITUS, TYPE 2) (HCC): ICD-10-CM

## 2022-12-10 DIAGNOSIS — R74.8 ELEVATED LIVER ENZYMES: ICD-10-CM

## 2022-12-10 LAB
ALBUMIN SERPL-MCNC: 3.8 G/DL (ref 3.4–5)
ALBUMIN/GLOB SERPL: 1 {RATIO} (ref 1–2)
ALP LIVER SERPL-CCNC: 103 U/L
ALT SERPL-CCNC: 40 U/L
ANION GAP SERPL CALC-SCNC: 5 MMOL/L (ref 0–18)
AST SERPL-CCNC: 39 U/L (ref 15–37)
BASOPHILS # BLD AUTO: 0.01 X10(3) UL (ref 0–0.2)
BASOPHILS NFR BLD AUTO: 0.2 %
BILIRUB SERPL-MCNC: 0.7 MG/DL (ref 0.1–2)
BUN BLD-MCNC: 29 MG/DL (ref 7–18)
CALCIUM BLD-MCNC: 9.4 MG/DL (ref 8.5–10.1)
CHLORIDE SERPL-SCNC: 107 MMOL/L (ref 98–112)
CHOLEST SERPL-MCNC: 202 MG/DL (ref ?–200)
CO2 SERPL-SCNC: 26 MMOL/L (ref 21–32)
CREAT BLD-MCNC: 1.6 MG/DL
EOSINOPHIL # BLD AUTO: 0.04 X10(3) UL (ref 0–0.7)
EOSINOPHIL NFR BLD AUTO: 0.7 %
ERYTHROCYTE [DISTWIDTH] IN BLOOD BY AUTOMATED COUNT: 18.6 %
EST. AVERAGE GLUCOSE BLD GHB EST-MCNC: 235 MG/DL (ref 68–126)
FASTING PATIENT LIPID ANSWER: YES
FASTING STATUS PATIENT QL REPORTED: YES
GFR SERPLBLD BASED ON 1.73 SQ M-ARVRAT: 43 ML/MIN/1.73M2 (ref 60–?)
GLOBULIN PLAS-MCNC: 4 G/DL (ref 2.8–4.4)
GLUCOSE BLD-MCNC: 178 MG/DL (ref 70–99)
HBA1C MFR BLD: 9.8 % (ref ?–5.7)
HCT VFR BLD AUTO: 40 %
HDLC SERPL-MCNC: 36 MG/DL (ref 40–59)
HGB BLD-MCNC: 12.2 G/DL
IMM GRANULOCYTES # BLD AUTO: 0.01 X10(3) UL (ref 0–1)
IMM GRANULOCYTES NFR BLD: 0.2 %
LDLC SERPL CALC-MCNC: 135 MG/DL (ref ?–100)
LYMPHOCYTES # BLD AUTO: 2.12 X10(3) UL (ref 1–4)
LYMPHOCYTES NFR BLD AUTO: 38.8 %
MCH RBC QN AUTO: 20.3 PG (ref 26–34)
MCHC RBC AUTO-ENTMCNC: 30.5 G/DL (ref 31–37)
MCV RBC AUTO: 66.4 FL
MONOCYTES # BLD AUTO: 0.6 X10(3) UL (ref 0.1–1)
MONOCYTES NFR BLD AUTO: 11 %
NEUTROPHILS # BLD AUTO: 2.68 X10 (3) UL (ref 1.5–7.7)
NEUTROPHILS # BLD AUTO: 2.68 X10(3) UL (ref 1.5–7.7)
NEUTROPHILS NFR BLD AUTO: 49.1 %
NONHDLC SERPL-MCNC: 166 MG/DL (ref ?–130)
OSMOLALITY SERPL CALC.SUM OF ELEC: 296 MOSM/KG (ref 275–295)
PLATELET # BLD AUTO: 122 10(3)UL (ref 150–450)
POTASSIUM SERPL-SCNC: 5.3 MMOL/L (ref 3.5–5.1)
PROT SERPL-MCNC: 7.8 G/DL (ref 6.4–8.2)
RBC # BLD AUTO: 6.02 X10(6)UL
SODIUM SERPL-SCNC: 138 MMOL/L (ref 136–145)
TRIGL SERPL-MCNC: 170 MG/DL (ref 30–149)
TSI SER-ACNC: 4.66 MIU/ML (ref 0.36–3.74)
VLDLC SERPL CALC-MCNC: 31 MG/DL (ref 0–30)
WBC # BLD AUTO: 5.5 X10(3) UL (ref 4–11)

## 2022-12-10 PROCEDURE — 80053 COMPREHEN METABOLIC PANEL: CPT

## 2022-12-10 PROCEDURE — 83036 HEMOGLOBIN GLYCOSYLATED A1C: CPT

## 2022-12-10 PROCEDURE — 84443 ASSAY THYROID STIM HORMONE: CPT

## 2022-12-10 PROCEDURE — 85025 COMPLETE CBC W/AUTO DIFF WBC: CPT

## 2022-12-10 PROCEDURE — 80061 LIPID PANEL: CPT

## 2022-12-10 PROCEDURE — 36415 COLL VENOUS BLD VENIPUNCTURE: CPT

## 2022-12-14 ENCOUNTER — TELEPHONE (OUTPATIENT)
Dept: ENDOCRINOLOGY CLINIC | Facility: CLINIC | Age: 82
End: 2022-12-14

## 2022-12-14 NOTE — TELEPHONE ENCOUNTER
Pt daughter Beth Meadows called and left a message. I returned her call and she states that her father had his labs done on 12/10 and his a1c was 9.8. She states that his BG have been running on the higher side and she is concerned.  Pt takes his BG once a day fasting every morning before breakfast    12/14: 217  12/13: 149  12/12: 203  12/11: 221  12/10: 184  12/9: 152  12/8: 166  12/7: 194  12/6: 150  12/5: 211  12/4: 156  12/3: 179  12/2: 192  12/1: 157    Pt is currently taking 5 mg of tradjenta daily and glipizide Er 2.5 mg twice daily     Future Appointments   Date Time Provider Craig Be   12/27/2022 12:30 PM [facility] CT MAIN RM[address] [place] [address]   2/2/2023  9:30 AM Laquita Norris APRN [facility] [address]

## 2022-12-15 ENCOUNTER — VIRTUAL PHONE E/M (OUTPATIENT)
Dept: ENDOCRINOLOGY CLINIC | Facility: CLINIC | Age: 82
End: 2022-12-15
Payer: MEDICAID

## 2022-12-15 DIAGNOSIS — N18.31 STAGE 3A CHRONIC KIDNEY DISEASE (HCC): ICD-10-CM

## 2022-12-15 DIAGNOSIS — E11.65 TYPE 2 DIABETES MELLITUS WITH HYPERGLYCEMIA, WITHOUT LONG-TERM CURRENT USE OF INSULIN (HCC): Primary | ICD-10-CM

## 2022-12-15 DIAGNOSIS — I25.10 CORONARY ARTERY DISEASE INVOLVING NATIVE CORONARY ARTERY OF NATIVE HEART, UNSPECIFIED WHETHER ANGINA PRESENT: ICD-10-CM

## 2022-12-15 PROCEDURE — 99214 OFFICE O/P EST MOD 30 MIN: CPT | Performed by: NURSE PRACTITIONER

## 2022-12-27 ENCOUNTER — HOSPITAL ENCOUNTER (OUTPATIENT)
Dept: CT IMAGING | Facility: HOSPITAL | Age: 82
Discharge: HOME | End: 2022-12-27
Attending: INTERNAL MEDICINE
Payer: MEDICAID

## 2022-12-27 DIAGNOSIS — I65.23 BILATERAL CAROTID ARTERY STENOSIS: ICD-10-CM

## 2022-12-27 LAB
CREAT BLD-MCNC: 1.9 MG/DL
GFR SERPLBLD BASED ON 1.73 SQ M-ARVRAT: 35 ML/MIN/1.73M2 (ref 60–?)

## 2022-12-27 PROCEDURE — 82565 ASSAY OF CREATININE: CPT

## 2022-12-27 PROCEDURE — 70498 CT ANGIOGRAPHY NECK: CPT | Performed by: INTERNAL MEDICINE

## 2023-01-05 ENCOUNTER — LAB ENCOUNTER (OUTPATIENT)
Dept: LAB | Facility: HOSPITAL | Age: 83
End: 2023-01-05
Attending: INTERNAL MEDICINE
Payer: MEDICAID

## 2023-01-05 DIAGNOSIS — E87.5 HYPERKALURIA: ICD-10-CM

## 2023-01-05 DIAGNOSIS — I25.10 CAD (CORONARY ARTERY DISEASE): Primary | ICD-10-CM

## 2023-01-05 LAB
ANION GAP SERPL CALC-SCNC: 8 MMOL/L (ref 0–18)
BUN BLD-MCNC: 36 MG/DL (ref 7–18)
CALCIUM BLD-MCNC: 9.2 MG/DL (ref 8.5–10.1)
CHLORIDE SERPL-SCNC: 105 MMOL/L (ref 98–112)
CO2 SERPL-SCNC: 23 MMOL/L (ref 21–32)
CREAT BLD-MCNC: 1.8 MG/DL
FASTING STATUS PATIENT QL REPORTED: NO
GFR SERPLBLD BASED ON 1.73 SQ M-ARVRAT: 37 ML/MIN/1.73M2 (ref 60–?)
GLUCOSE BLD-MCNC: 222 MG/DL (ref 70–99)
OSMOLALITY SERPL CALC.SUM OF ELEC: 297 MOSM/KG (ref 275–295)
POTASSIUM SERPL-SCNC: 5 MMOL/L (ref 3.5–5.1)
SODIUM SERPL-SCNC: 136 MMOL/L (ref 136–145)

## 2023-01-05 PROCEDURE — 36415 COLL VENOUS BLD VENIPUNCTURE: CPT

## 2023-01-05 PROCEDURE — 80048 BASIC METABOLIC PNL TOTAL CA: CPT

## 2023-01-12 ENCOUNTER — VIRTUAL PHONE E/M (OUTPATIENT)
Dept: ENDOCRINOLOGY CLINIC | Facility: CLINIC | Age: 83
End: 2023-01-12
Payer: MEDICAID

## 2023-01-12 DIAGNOSIS — I25.10 CORONARY ARTERY DISEASE INVOLVING NATIVE CORONARY ARTERY OF NATIVE HEART, UNSPECIFIED WHETHER ANGINA PRESENT: ICD-10-CM

## 2023-01-12 DIAGNOSIS — N18.31 STAGE 3A CHRONIC KIDNEY DISEASE (HCC): ICD-10-CM

## 2023-01-12 DIAGNOSIS — E11.65 TYPE 2 DIABETES MELLITUS WITH HYPERGLYCEMIA, WITHOUT LONG-TERM CURRENT USE OF INSULIN (HCC): Primary | ICD-10-CM

## 2023-01-12 PROCEDURE — 99213 OFFICE O/P EST LOW 20 MIN: CPT | Performed by: NURSE PRACTITIONER

## 2023-01-12 NOTE — PATIENT INSTRUCTIONS
Your trends are doing better   Update a1c with next cardiology lab work up    No need to fast for this blood test     Continue;    Glipizide XL  2.5mg : 1 tab twice daily    Tradjenta 5mg once daily  In AM   Farxiga 10mg once daily in AM     Fluctuations in blood sugars are best detected by testing your sugar with your meter. In order for me to determine any patterns in your blood sugars, you will need to test your blood sugar 1- 2 times daily     It would be best to change up the times of day that you are testing your sugar. Always test before breakfast (fasting) and then alternate testing blood sugar 1-2 hours after your meals.      Blood sugar targets:     Fasting blood sugar (before breakfast) Target:     2 hours after eating less than 200     Call for blood sugars less than  85 or greater than  200 more than 2 times in a week

## 2023-01-13 ENCOUNTER — TELEPHONE (OUTPATIENT)
Dept: FAMILY MEDICINE CLINIC | Facility: CLINIC | Age: 83
End: 2023-01-13

## 2023-01-13 DIAGNOSIS — R79.89 ELEVATED TSH: Primary | ICD-10-CM

## 2023-01-16 NOTE — TELEPHONE ENCOUNTER
Spoke with pt's daughter in law, informed of MD recommendations below. Alveta Lanes verbalized understanding and agreement. All questions answered.

## 2023-02-02 ENCOUNTER — VIRTUAL PHONE E/M (OUTPATIENT)
Dept: ENDOCRINOLOGY CLINIC | Facility: CLINIC | Age: 83
End: 2023-02-02

## 2023-02-02 DIAGNOSIS — G62.9 NEUROPATHY: Primary | ICD-10-CM

## 2023-02-02 NOTE — PROGRESS NOTES
Daughter called  Pt not available-     No charge today      asking for B12 level for her father since his B Feet are painful at rest  Having recent labs for cardio. PCP    Ordered B12       May recommend ALA 6oomg  Studies have show some benefit with neuropathy symptoms with Alpha Lipoic Acid (ALA) supplement: 600 mg daily. ALA is an antioxidant with the potential to diminish oxidative stress, improve the underlying pathophysiology of neuropathy, and reduce pain. It may take up to 3 weeks to notice the benefit    There are no long-term studies that assess the effect of ALA on progression of neuropathy.

## 2023-02-04 ENCOUNTER — LAB ENCOUNTER (OUTPATIENT)
Dept: LAB | Facility: HOSPITAL | Age: 83
End: 2023-02-04
Attending: NURSE PRACTITIONER
Payer: MEDICAID

## 2023-02-04 DIAGNOSIS — K75.9 HEPATITIS: ICD-10-CM

## 2023-02-04 LAB
ALBUMIN SERPL-MCNC: 3.9 G/DL (ref 3.4–5)
ALP LIVER SERPL-CCNC: 101 U/L
ALT SERPL-CCNC: 30 U/L
AST SERPL-CCNC: 32 U/L (ref 15–37)
BILIRUB DIRECT SERPL-MCNC: 0.2 MG/DL (ref 0–0.2)
BILIRUB SERPL-MCNC: 0.6 MG/DL (ref 0.1–2)
EST. AVERAGE GLUCOSE BLD GHB EST-MCNC: 197 MG/DL (ref 68–126)
HBA1C MFR BLD: 8.5 % (ref ?–5.7)
PROT SERPL-MCNC: 7.2 G/DL (ref 6.4–8.2)
VIT B12 SERPL-MCNC: 346 PG/ML (ref 193–986)

## 2023-02-04 PROCEDURE — 36415 COLL VENOUS BLD VENIPUNCTURE: CPT | Performed by: NURSE PRACTITIONER

## 2023-02-04 PROCEDURE — 83036 HEMOGLOBIN GLYCOSYLATED A1C: CPT | Performed by: NURSE PRACTITIONER

## 2023-02-04 PROCEDURE — 80076 HEPATIC FUNCTION PANEL: CPT

## 2023-02-04 PROCEDURE — 82607 VITAMIN B-12: CPT | Performed by: NURSE PRACTITIONER

## 2023-02-18 NOTE — ED INITIAL ASSESSMENT (HPI)
Pt states he has elevated liver enzymes. Pt had original blood work in february and enzymes have increased since. Pt states minimal abd pain and fatigue.
Home

## 2023-03-06 RX ORDER — LINAGLIPTIN 5 MG/1
5 TABLET, FILM COATED ORAL DAILY
Qty: 30 TABLET | Refills: 3 | Status: SHIPPED | OUTPATIENT
Start: 2023-03-06

## 2023-03-06 RX ORDER — GLIPIZIDE 2.5 MG/1
TABLET, EXTENDED RELEASE ORAL
Qty: 180 TABLET | Refills: 1 | Status: SHIPPED | OUTPATIENT
Start: 2023-03-06

## 2023-03-27 ENCOUNTER — TELEPHONE (OUTPATIENT)
Dept: FAMILY MEDICINE CLINIC | Facility: CLINIC | Age: 83
End: 2023-03-27

## 2023-03-27 DIAGNOSIS — R79.89 ELEVATED TSH: Primary | ICD-10-CM

## 2023-03-27 DIAGNOSIS — H91.90 HEARING LOSS, UNSPECIFIED HEARING LOSS TYPE, UNSPECIFIED LATERALITY: ICD-10-CM

## 2023-03-27 NOTE — TELEPHONE ENCOUNTER
Daughter in law calling, requesting lab orders to be changed to [facility] lab. Daughter in law states patient was taken to lab and was told labs could not be done sooner than 4-13 which was noted on order according to lab. Also requesting new order for hearing test, patient was given prior referral where appointment was canceled due to PCP not being a doctor with that health system.  She is requesting to be referred within [facility], please advise

## 2023-03-28 NOTE — TELEPHONE ENCOUNTER
Thyroid labs pended, do you want pt to wait until 4/13 or can he complete labs now?     Referral pended for ENT, Dr. Shane Davison (previously referred to Dr. Juan José Acevedo but appt was cancelled since pt does not have [address] [place] [address] PCP)

## 2023-04-13 ENCOUNTER — VIRTUAL PHONE E/M (OUTPATIENT)
Dept: ENDOCRINOLOGY CLINIC | Facility: CLINIC | Age: 83
End: 2023-04-13
Payer: MEDICAID

## 2023-04-13 DIAGNOSIS — I25.10 CORONARY ARTERY DISEASE INVOLVING NATIVE CORONARY ARTERY OF NATIVE HEART, UNSPECIFIED WHETHER ANGINA PRESENT: ICD-10-CM

## 2023-04-13 DIAGNOSIS — I10 ESSENTIAL HYPERTENSION: ICD-10-CM

## 2023-04-13 DIAGNOSIS — N18.31 STAGE 3A CHRONIC KIDNEY DISEASE (HCC): ICD-10-CM

## 2023-04-13 DIAGNOSIS — E11.65 TYPE 2 DIABETES MELLITUS WITH HYPERGLYCEMIA, WITHOUT LONG-TERM CURRENT USE OF INSULIN (HCC): Primary | ICD-10-CM

## 2023-04-13 DIAGNOSIS — G62.9 NEUROPATHY: ICD-10-CM

## 2023-04-13 PROCEDURE — 99214 OFFICE O/P EST MOD 30 MIN: CPT | Performed by: NURSE PRACTITIONER

## 2023-04-26 ENCOUNTER — VIRTUAL PHONE E/M (OUTPATIENT)
Dept: FAMILY MEDICINE CLINIC | Facility: CLINIC | Age: 83
End: 2023-04-26
Payer: MEDICAID

## 2023-04-26 DIAGNOSIS — H91.90 HEARING LOSS, UNSPECIFIED HEARING LOSS TYPE, UNSPECIFIED LATERALITY: ICD-10-CM

## 2023-04-26 DIAGNOSIS — E11.42 DIABETIC POLYNEUROPATHY ASSOCIATED WITH TYPE 2 DIABETES MELLITUS (HCC): Primary | ICD-10-CM

## 2023-04-26 PROCEDURE — 99213 OFFICE O/P EST LOW 20 MIN: CPT | Performed by: FAMILY MEDICINE

## 2023-04-26 RX ORDER — GABAPENTIN 300 MG/1
300 CAPSULE ORAL 2 TIMES DAILY PRN
Qty: 60 CAPSULE | Refills: 0 | Status: SHIPPED | OUTPATIENT
Start: 2023-04-26

## 2023-04-27 PROBLEM — E11.42 DIABETIC POLYNEUROPATHY ASSOCIATED WITH TYPE 2 DIABETES MELLITUS (HCC): Status: ACTIVE | Noted: 2023-04-27

## 2023-05-02 ENCOUNTER — TELEPHONE (OUTPATIENT)
Dept: FAMILY MEDICINE CLINIC | Facility: CLINIC | Age: 83
End: 2023-05-02

## 2023-05-02 NOTE — TELEPHONE ENCOUNTER
Pt daughter-in-law calling on behalf of Pt. DIL states both providers on previous referrals do not accept Pt insurance, Gay. Requesting another referral to a provider that does take [employer]. Please advise MANOLO.

## 2023-05-05 ENCOUNTER — LAB ENCOUNTER (OUTPATIENT)
Dept: LAB | Facility: HOSPITAL | Age: 83
End: 2023-05-05
Attending: INTERNAL MEDICINE
Payer: MEDICAID

## 2023-05-05 ENCOUNTER — TELEPHONE (OUTPATIENT)
Dept: ENDOCRINOLOGY CLINIC | Facility: CLINIC | Age: 83
End: 2023-05-05

## 2023-05-05 DIAGNOSIS — I10 BENIGN ESSENTIAL HYPERTENSION: Primary | ICD-10-CM

## 2023-05-05 DIAGNOSIS — R79.89 ELEVATED LFTS: ICD-10-CM

## 2023-05-05 DIAGNOSIS — E03.9 HYPOTHYROIDISM: ICD-10-CM

## 2023-05-05 DIAGNOSIS — E11.9 DM TYPE 2 (DIABETES MELLITUS, TYPE 2) (HCC): ICD-10-CM

## 2023-05-05 DIAGNOSIS — E87.5 HYPERKALEMIA: ICD-10-CM

## 2023-05-05 DIAGNOSIS — R79.89 ELEVATED TSH: ICD-10-CM

## 2023-05-05 DIAGNOSIS — E11.65 TYPE 2 DIABETES MELLITUS WITH HYPERGLYCEMIA, WITHOUT LONG-TERM CURRENT USE OF INSULIN (HCC): ICD-10-CM

## 2023-05-05 LAB
ALBUMIN SERPL-MCNC: 3.8 G/DL (ref 3.4–5)
ALBUMIN/GLOB SERPL: 1.1 {RATIO} (ref 1–2)
ALP LIVER SERPL-CCNC: 105 U/L
ALT SERPL-CCNC: 38 U/L
ANION GAP SERPL CALC-SCNC: 1 MMOL/L (ref 0–18)
AST SERPL-CCNC: 31 U/L (ref 15–37)
BASOPHILS # BLD AUTO: 0.01 X10(3) UL (ref 0–0.2)
BASOPHILS NFR BLD AUTO: 0.2 %
BILIRUB SERPL-MCNC: 0.7 MG/DL (ref 0.1–2)
BUN BLD-MCNC: 28 MG/DL (ref 7–18)
CALCIUM BLD-MCNC: 9.6 MG/DL (ref 8.5–10.1)
CHLORIDE SERPL-SCNC: 110 MMOL/L (ref 98–112)
CHOLEST SERPL-MCNC: 134 MG/DL (ref ?–200)
CO2 SERPL-SCNC: 27 MMOL/L (ref 21–32)
CREAT BLD-MCNC: 1.51 MG/DL
EOSINOPHIL # BLD AUTO: 0.03 X10(3) UL (ref 0–0.7)
EOSINOPHIL NFR BLD AUTO: 0.5 %
ERYTHROCYTE [DISTWIDTH] IN BLOOD BY AUTOMATED COUNT: 19.3 %
EST. AVERAGE GLUCOSE BLD GHB EST-MCNC: 220 MG/DL (ref 68–126)
FASTING PATIENT LIPID ANSWER: YES
FASTING STATUS PATIENT QL REPORTED: YES
GFR SERPLBLD BASED ON 1.73 SQ M-ARVRAT: 46 ML/MIN/1.73M2 (ref 60–?)
GLOBULIN PLAS-MCNC: 3.4 G/DL (ref 2.8–4.4)
GLUCOSE BLD-MCNC: 167 MG/DL (ref 70–99)
HBA1C MFR BLD: 9.3 % (ref ?–5.7)
HCT VFR BLD AUTO: 44.5 %
HDLC SERPL-MCNC: 36 MG/DL (ref 40–59)
HGB BLD-MCNC: 13.3 G/DL
IMM GRANULOCYTES # BLD AUTO: 0.03 X10(3) UL (ref 0–1)
IMM GRANULOCYTES NFR BLD: 0.5 %
LDLC SERPL CALC-MCNC: 79 MG/DL (ref ?–100)
LYMPHOCYTES # BLD AUTO: 1.61 X10(3) UL (ref 1–4)
LYMPHOCYTES NFR BLD AUTO: 29.2 %
MCH RBC QN AUTO: 20.2 PG (ref 26–34)
MCHC RBC AUTO-ENTMCNC: 29.9 G/DL (ref 31–37)
MCV RBC AUTO: 67.5 FL
MONOCYTES # BLD AUTO: 0.51 X10(3) UL (ref 0.1–1)
MONOCYTES NFR BLD AUTO: 9.2 %
NEUTROPHILS # BLD AUTO: 3.33 X10 (3) UL (ref 1.5–7.7)
NEUTROPHILS # BLD AUTO: 3.33 X10(3) UL (ref 1.5–7.7)
NEUTROPHILS NFR BLD AUTO: 60.4 %
NONHDLC SERPL-MCNC: 98 MG/DL (ref ?–130)
OSMOLALITY SERPL CALC.SUM OF ELEC: 295 MOSM/KG (ref 275–295)
PLATELET # BLD AUTO: 104 10(3)UL (ref 150–450)
POTASSIUM SERPL-SCNC: 5.3 MMOL/L (ref 3.5–5.1)
PROT SERPL-MCNC: 7.2 G/DL (ref 6.4–8.2)
RBC # BLD AUTO: 6.59 X10(6)UL
SODIUM SERPL-SCNC: 138 MMOL/L (ref 136–145)
T4 FREE SERPL-MCNC: 1 NG/DL (ref 0.8–1.7)
TRIGL SERPL-MCNC: 100 MG/DL (ref 30–149)
TSI SER-ACNC: 2.32 MIU/ML (ref 0.36–3.74)
VIT B12 SERPL-MCNC: 929 PG/ML (ref 193–986)
VLDLC SERPL CALC-MCNC: 16 MG/DL (ref 0–30)
WBC # BLD AUTO: 5.5 X10(3) UL (ref 4–11)

## 2023-05-05 PROCEDURE — 84443 ASSAY THYROID STIM HORMONE: CPT

## 2023-05-05 PROCEDURE — 84439 ASSAY OF FREE THYROXINE: CPT

## 2023-05-05 PROCEDURE — 80053 COMPREHEN METABOLIC PANEL: CPT

## 2023-05-05 PROCEDURE — 82607 VITAMIN B-12: CPT

## 2023-05-05 PROCEDURE — 85025 COMPLETE CBC W/AUTO DIFF WBC: CPT

## 2023-05-05 PROCEDURE — 80061 LIPID PANEL: CPT

## 2023-05-05 PROCEDURE — 36415 COLL VENOUS BLD VENIPUNCTURE: CPT

## 2023-05-05 PROCEDURE — 83036 HEMOGLOBIN GLYCOSYLATED A1C: CPT

## 2023-05-05 NOTE — TELEPHONE ENCOUNTER
Pt daughter called in with questions on labs that had been ordered, etc.  Answered questions, states they will get labs done.

## 2023-06-02 ENCOUNTER — TELEPHONE (OUTPATIENT)
Dept: ENDOCRINOLOGY CLINIC | Facility: CLINIC | Age: 83
End: 2023-06-02

## 2023-06-02 NOTE — TELEPHONE ENCOUNTER
Pt's daughter in law called with questions about supplement that Chavo recommended. Reviewed from chart note and pt instructions. No further questions.

## 2023-06-21 NOTE — TELEPHONE ENCOUNTER
Name from pharmacy: Gabapentin 300 MG Oral Capsule          Will file in chart as: GABAPENTIN 300 MG Oral Cap    Sig: TAKE 1 CAPSULE BY MOUTH TWICE DAILY AS NEEDED    Disp: 60 capsule    Refills: 0    Start: 6/19/2023    Class: Normal    Non-formulary    Last ordered: 1 month ago by Manuel Ware MD Last refill: 4/26/2023    Rx #: [id number]       To be filled at: [facility] [address] [facility], [place] [address] [address] [facility] [phone], [phone]     No future appointments.     LOV: phone visit: DM: 4/26/23, last in person visit: 5/6/22  Last r/f: 4/26/23 # 60 0 r/fs  Labs: 5/5/23       Please advise

## 2023-06-22 RX ORDER — GABAPENTIN 300 MG/1
300 CAPSULE ORAL 2 TIMES DAILY PRN
Qty: 60 CAPSULE | Refills: 0 | Status: SHIPPED | OUTPATIENT
Start: 2023-06-22

## 2023-07-10 NOTE — TELEPHONE ENCOUNTER
Requested Prescriptions     Pending Prescriptions Disp Refills    TRADJENTA 5 MG Oral Tab [Pharmacy Med Name: Tradjenta 5 MG Oral Tablet] 30 tablet 0     Sig: Take 1 tablet by mouth once daily       Last A1c value was 9.3% done 5/5/2023.     Refill 3/06/23  LOV 4/13/23 virtual    No FU appt scheduled, messaged pt via Fotofeedback

## 2023-07-13 RX ORDER — LINAGLIPTIN 5 MG/1
5 TABLET, FILM COATED ORAL DAILY
Qty: 90 TABLET | Refills: 0 | Status: SHIPPED | OUTPATIENT
Start: 2023-07-13

## 2023-07-17 NOTE — TELEPHONE ENCOUNTER
Name from pharmacy: Gabapentin 300 MG Oral Capsule          Will file in chart as: GABAPENTIN 300 MG Oral Cap    Sig: TAKE 1 CAPSULE BY MOUTH TWICE DAILY AS NEEDED    Disp: 60 capsule    Refills: 0    Start: 7/17/2023    Class: Normal    Non-formulary    Last ordered: 3 weeks ago by Hemalatha Waters MD Last refill: 6/22/2023    Rx #: [id number]       To be filled at: [address], T[address] [facility] [address] [phone], [phone]     Future Appointments   Date Time Provider Craig Be   8/24/2023 12:00 PM Boneta Blood, APRN [facility] [address]     LOV: 5/6/22  Last r/f: 6/22/23 # 60 0 r/fs  Labs: 5/5/23     RTC: none    Please advise

## 2023-07-18 RX ORDER — GABAPENTIN 300 MG/1
300 CAPSULE ORAL 2 TIMES DAILY PRN
Qty: 60 CAPSULE | Refills: 2 | Status: SHIPPED | OUTPATIENT
Start: 2023-07-18

## 2023-07-18 NOTE — TELEPHONE ENCOUNTER
PA submitted through [facility], sent to [employer].   Awaiting on approval/denial.
Exercise

## 2023-08-24 ENCOUNTER — VIRTUAL PHONE E/M (OUTPATIENT)
Dept: ENDOCRINOLOGY CLINIC | Facility: CLINIC | Age: 83
End: 2023-08-24
Payer: MEDICAID

## 2023-08-24 DIAGNOSIS — E11.65 TYPE 2 DIABETES MELLITUS WITH HYPERGLYCEMIA, WITHOUT LONG-TERM CURRENT USE OF INSULIN (HCC): Primary | ICD-10-CM

## 2023-08-24 NOTE — PROGRESS NOTES
Pt daughter Vishal Bañuelos was called x 2 for 12 pm phone visit  No answer  Left message; lab update needed for DM   Advised to call back reschedule DM appt   Orders Placed This Encounter      Basic Metabolic Panel (8)          Standing Status: Future          Standing Expiration Date: 8/24/2024      Hemoglobin A1C          Standing Status: Future          Standing Expiration Date: 8/24/2024      Microalb/Creat Ratio, Random Urine          Standing Status: Future          Standing Expiration Date: 8/24/2024

## 2023-09-18 ENCOUNTER — TELEPHONE (OUTPATIENT)
Dept: FAMILY MEDICINE CLINIC | Facility: CLINIC | Age: 83
End: 2023-09-18

## 2023-09-18 DIAGNOSIS — H91.90 DECREASED HEARING, UNSPECIFIED LATERALITY: Primary | ICD-10-CM

## 2023-09-22 DIAGNOSIS — E11.69 DIABETES MELLITUS TYPE 2 IN OBESE: Primary | ICD-10-CM

## 2023-09-22 DIAGNOSIS — E66.9 DIABETES MELLITUS TYPE 2 IN OBESE: Primary | ICD-10-CM

## 2023-09-22 RX ORDER — BLOOD SUGAR DIAGNOSTIC
STRIP MISCELLANEOUS
Qty: 100 STRIP | Refills: 3 | Status: SHIPPED | OUTPATIENT
Start: 2023-09-22

## 2023-09-22 NOTE — TELEPHONE ENCOUNTER
Pt's daughter called in stating he needs refill on test strips. Inquired about CGM. Pt is not on insulin so would likely not be covered.

## 2023-10-03 ENCOUNTER — TELEPHONE (OUTPATIENT)
Dept: ENDOCRINOLOGY CLINIC | Facility: CLINIC | Age: 83
End: 2023-10-03

## 2023-10-03 NOTE — TELEPHONE ENCOUNTER
2nd reminder to update labs (blood and urine))   Would contact PCP and cardiology as well to see if they want any  labs     If he cannot complete labs, telehealth appt needs to be cancelleld

## 2023-10-05 ENCOUNTER — TELEPHONE (OUTPATIENT)
Dept: ENDOCRINOLOGY CLINIC | Facility: CLINIC | Age: 83
End: 2023-10-05

## 2023-10-05 NOTE — TELEPHONE ENCOUNTER
Rescheduled apt for nov 2 with marce paz is aware that bloodwork that needs to be done befopre virtual apt

## 2023-10-05 NOTE — TELEPHONE ENCOUNTER
Requested Renewals     Name from pharmacy: Gabapentin 300 MG Oral Capsule         Will file in chart as: GABAPENTIN 300 MG Oral Cap    Sig: TAKE 1 CAPSULE BY MOUTH TWICE DAILY AS NEEDED    Disp: 60 capsule    Refills: 0    Start: 10/5/2023    Class: Normal          Future Appointments   Date Time Provider Craig Be   10/20/2023  9:30 AM EMG AUDIO NAPER EMGAUDIONAPE [id number]   10/20/2023  9:50 AM Tevin Metzger MD [facility]   10/26/2023  7:30 AM NORA Sung [facility]CT[facility] [address] [address]     LOV: 5/6/22    -please call patient to schedule annual exam as he is overdue. Thank you!

## 2023-10-09 RX ORDER — LINAGLIPTIN 5 MG/1
5 TABLET, FILM COATED ORAL DAILY
Qty: 90 TABLET | Refills: 0 | Status: SHIPPED | OUTPATIENT
Start: 2023-10-09

## 2023-10-09 NOTE — TELEPHONE ENCOUNTER
Requested Prescriptions     Pending Prescriptions Disp Refills    TRADJENTA 5 MG Oral Tab [Pharmacy Med Name: Tradjenta 5 MG Oral Tablet] 90 tablet 0     Sig: Take 1 tablet by mouth once daily     Future Appointments   Date Time Provider Craig Be   10/20/2023  9:30 AM EMG AUDIO NAPER EMGAUDIONAPE [facility]   10/20/2023  9:50 AM Mega Barron MD [facility]   10/26/2023  7:30 AM NORA Purcell [facility] [address]   10/27/2023 11:30 AM Manny Brown MD [facility] [address] [facility] [address]     Last A1c value was 9.3% done 5/5/2023.   Refill 3/6/23  LOV 8/24/23

## 2023-10-09 NOTE — TELEPHONE ENCOUNTER
Pt will need a refill on the medication prior to his upcoming appt.   Future Appointments   Date Time Provider Craig Castellanoi   10/20/2023  9:30 [facility]   10/20/2023  9:50 AM Tino Andre MD [facility]   10/26/2023  7:30 AM NORA Chopra [facility] [address] [address]   10/27/2023 11:30 AM Tha Mazariegos MD [facility] [address]

## 2023-10-11 RX ORDER — GABAPENTIN 300 MG/1
300 CAPSULE ORAL 2 TIMES DAILY PRN
Qty: 60 CAPSULE | Refills: 0 | Status: SHIPPED | OUTPATIENT
Start: 2023-10-11

## 2023-10-12 NOTE — TELEPHONE ENCOUNTER
Requested Prescriptions     Pending Prescriptions Disp Refills    FARXIGA 10 MG Oral Tab [Pharmacy Med Name: Nik Barley 10 MG Oral Tablet] 90 tablet 0     Sig: Take 1 tablet by mouth once daily     Future Appointments   Date Time Provider Craig Be   10/20/2023  9:30 AM EMG AUDIO NAPER [facility]   10/20/2023  9:50 AM Jessenia Iverson MD [facility]   10/26/2023  7:30 AM NORA Callaway [facility] [address] [address]   10/27/2023 11:30 AM Travis Bunch MD [facility] [address] [facility]     Last A1c value was 9.3% done 5/5/2023.   Refill 4/17/23  LOV 8/24/23 5/5/23  eGFR-Cr  >=60 mL/min/1.73m2 46 Low

## 2023-10-16 RX ORDER — GLIPIZIDE 2.5 MG/1
TABLET, EXTENDED RELEASE ORAL
Qty: 180 TABLET | Refills: 0 | Status: SHIPPED | OUTPATIENT
Start: 2023-10-16

## 2023-10-16 NOTE — TELEPHONE ENCOUNTER
Requested Prescriptions     Pending Prescriptions Disp Refills    GLIPIZIDE ER 2.5 MG Oral Tablet 24 Hr [Pharmacy Med Name: glipiZIDE ER 2.5 MG Oral Tablet Extended Release 24 Hour] 180 tablet 0     Sig: Take 1 tablet by mouth twice daily     Your appointments       Date & Time Appointment Department ([place])    Oct 20, 2023  9:30 AM CDT Exam - New Patient with EMG AUDIO NORI [facility], [address] [facility] ([address])        Oct 20, 2023  9:50 AM CDT Exam New with Ronal Mcdonald MD [facility], [address] [facility] ([address] [facility] [address] [facility])        Oct 26, 2023  7:30 AM CDT Virtual Phone Visit with NORA Cee [facility], [address] [place] [address] ( [address])    You have been scheduled for a Virtual Telephone visit with your provider. Please be available at your phone so that your physician can contact you, and be prepared with any questions or concerns. You may be billed a copay at a later time, depending upon your insurance. As always, your health is our priority.          Oct 27, 2023 11:30 AM CDT Physical - Established with MD Shirley Oh, [address] [facility] ([address] [facility])              [facility], [address] , [facility] [address] [place] [address]  [address] [place] [address] [address]  [facility] [address] [phone] [facility], [address], [place]  [facility] DI[facility]ET[facility] [place]  [address] [facility] [address] [facility] [address] [facility], Three [facility] [address] [facility]  [phone]    Shirley Romero, [address]  [address]  [phone]          Last A1c value was 9.3% done 5/5/2023.     Refill 3/6/23  LOV 8/24/23 tele

## 2023-10-19 ENCOUNTER — TELEPHONE (OUTPATIENT)
Dept: FAMILY MEDICINE CLINIC | Facility: CLINIC | Age: 83
End: 2023-10-19

## 2023-10-19 NOTE — TELEPHONE ENCOUNTER
Received DM eye exam dated 10/18/23 from [facility], Dr. Esther Weeks. Report has been abstracted. HM updated.

## 2023-10-20 ENCOUNTER — OFFICE VISIT (OUTPATIENT)
Facility: LOCATION | Age: 83
End: 2023-10-20
Payer: MEDICAID

## 2023-10-20 DIAGNOSIS — H93.293 ABNORMAL AUDITORY PERCEPTION OF BOTH EARS: Primary | ICD-10-CM

## 2023-10-20 DIAGNOSIS — H90.3 SENSORINEURAL HEARING LOSS (SNHL) OF BOTH EARS: Primary | ICD-10-CM

## 2023-10-20 PROCEDURE — 92567 TYMPANOMETRY: CPT | Performed by: AUDIOLOGIST

## 2023-10-20 PROCEDURE — 99203 OFFICE O/P NEW LOW 30 MIN: CPT | Performed by: OTOLARYNGOLOGY

## 2023-10-20 PROCEDURE — 92557 COMPREHENSIVE HEARING TEST: CPT | Performed by: AUDIOLOGIST

## 2023-10-20 NOTE — PROGRESS NOTES
Curtis Mayfield is a 81year old male. Patient presents with:  Ear Problem  Hearing Check    HPI:   He has a longstanding history of hearing loss. He has had asymmetric hearing loss for many many years. He denies history ear infections. He denies tinnitus or vertigo. Current Outpatient Medications   Medication Sig Dispense Refill    GLIPIZIDE ER 2.5 MG Oral Tablet 24 Hr Take 1 tablet by mouth twice daily 180 tablet 0    dapagliflozin (FARXIGA) 10 MG Oral Tab Take 1 tablet (10 mg total) by mouth daily. 90 tablet 0    gabapentin 300 MG Oral Cap Take 1 capsule (300 mg total) by mouth 2 (two) times daily as needed. 60 capsule 0    linaGLIPtin (TRADJENTA) 5 mg Oral Tab Take 1 tablet (5 mg total) by mouth daily. 90 tablet 0    Glucose Blood (ONETOUCH VERIO) In Vitro Strip Test blood sugar once daily, E11.65, no current insulin use 100 strip 3    Multiple Vitamins-Minerals (MULTIVITAMIN ADULTS OR) Take 1 tablet by mouth daily. hydrALAZINE 100 MG Oral Tab Take 1 tablet (100 mg total) by mouth 2 (two) times daily. 0    polymyxin B-trimethoprim 77833-1.1 UNIT/ML-% Ophthalmic Solution 1 DROP IN AFFECTED EYE THREE TIMES A DAY FOR UPTO 7 DAYS 10 mL 0    Blood Glucose Monitoring Suppl ([facility]) w/Device Does not apply Kit 1 each by Does not apply route 4 (four) times daily. 1 kit 0    OneTouch Delica Lancets 71K Does not apply Misc 1 each by Does not apply route 4 (four) times daily. 400 each 0    acetaminophen 325 MG Oral Tab Take 325 mg by mouth every 6 (six) hours as needed for Pain. Clopidogrel Bisulfate 75 MG Oral Tab Take 75 mg by mouth daily. rivaroxaban 15 MG Oral Tab Take 15 mg by mouth daily with food.         Past Medical History:   Diagnosis Date    Arrhythmia     AFIB    ASHD (arteriosclerotic heart disease)     Atherosclerosis of coronary artery     Carotid artery stenosis     Cataract     Coronary atherosclerosis     Heart attack (HCC)     High blood pressure     High cholesterol History of 2019 novel coronavirus disease (COVID-19) 12/26/2021    cough, sore throat, headache, fever, body aches; no hospitalization, no lingering symptoms    Stroke ([address]     no residual deficits    Type II or unspecified type diabetes mellitus without mention of complication, not stated as uncontrolled     Visual impairment     glasses. rupture vessel of R eye      Social History:  Social History     Socioeconomic History    Marital status:    Tobacco Use    Smoking status: Never    Smokeless tobacco: Never   Vaping Use    Vaping Use: Never used   Substance and Sexual Activity    Alcohol use: No    Drug use: No      Past Surgical History:   Procedure Laterality Date    ANGIOGRAM      ANGIOPLASTY (CORONARY)      CATARACT      CATH DRUG ELUTING STENT      CATH PERCUTANEOUS  TRANSLUMINAL CORONARY ANGIOPLASTY      COLONOSCOPY N/A 1/9/2020    Procedure: COLONOSCOPY;  Surgeon: David Barry MD;  Location: [address] [place] [address] ENDOSCOPY    NEEDLE BIOPSY LIVER  03/2022= Cirrhosis    Repeat MRI Abd 3/23         REVIEW OF SYSTEMS:   GENERAL HEALTH: feels well otherwise  GENERAL : denies fever, chills, sweats, weight loss, weight gain  SKIN: denies any unusual skin lesions or rashes  RESPIRATORY: denies shortness of breath with exertion  NEURO: denies headaches    EXAM:   There were no vitals taken for this visit. System Findings Details   Constitutional  Overall appearance - Normal.   Psychiatric  Orientation - Oriented to time, place, person & situation. Appropriate mood and affect. Head/Face  Facial features -- Normal. Skull - Normal.   Eyes  Pupils equal ,round ,react to light and accomidate   Ears, Nose, Throat, Neck  Ears clear oropharynx clear neck no masses   Neurological  Memory - Normal. Cranial nerves - Cranial nerves II through XII grossly intact. Lymph Detail  Submental. Submandibular. Anterior cervical. Posterior cervical. Supraclavicular.      Latest Audiogram Result (Hz) Exam performed: 10/20/2023 9:30 AM Last edited by Thad Swanson MS, CCC-A on 10/20/2023 9:57 AM        125 250  1500 2000 3000 4000 6000 8000    Right air:  40 30  30 50 55 75 80  90    Left air:  60    65 60 75 80  90    Left air (masked):   80  70          Right mastoid bone:   30  30  55  70      Left mastoid bone:       55  70      Left mastoid bone (masked):   70N  60             Reliability:  Good    Transducer:  Headphones    Technique:      Comments:            Latest Speech Audiometry  Last edited by Thad Swanson MS, CCC-A on 10/20/2023 9:57 AM       Ear Method PTA SAT SRT [facility] Test/list Score (%) Intensity Mask/noise Notes    right    40    72 80      left    65    52 95 70      Comments: English is not primary language. Scores may be reflective of language barrier. Selected spondees                  Latest Tympanogram Result       Probe Tone (Hz): Unknown Exam performed: 10/20/2023 9:30 AM Last edited by Thad Swanson MS, CCC-A on 10/20/2023 9:57 AM      Tympanograms  These were drawn by a user, not generated from device data      Right Ear Left Ear                     Right Ear Left Ear    Tympanogram type:      Canal volume (mL): 1.62 1.44    Peak pressure (daPa):      Peak amplitude (mL):      Tympanogram width (daPa): Comments:                    Latest Audiogram and Tympanogram Result Text  Last edited by Thad Swanson MS, CCC-A on 10/20/2023  9:57 AM      Study Result                 Narrative & Impression  Patient complains of decreased hearing L>R. Dizziness and tinnitus denied. Audiogram:  Right-  Mild to profound SNHL with fair WR ability in quiet. Left - Moderately severe to profound SNHL with poor WR ability in quiet. Tympanogram: WNL bilaterally                   ASSESSMENT AND PLAN:   1. Sensorineural hearing loss (SNHL) of both ears  Audiogram was reviewed. Does show asymmetric hearing loss worse in the left ear. He is medically cleared for hearing aids.   At the very least he will see me back in 1 year for repeat audiogram.      The patient indicates understanding of these issues and agrees to the plan. No follow-ups on file.     Chasity Lara MD  10/20/2023  10:33 AM

## 2023-10-20 NOTE — PROGRESS NOTES
Noe King was seen for an audiometric evaluation and tympanogram today. Referred back to physician. Hearing aid selection post medical clearance. List of HA facilities given to patient.     Apolinar Ramon MS, CCC-A

## 2023-10-21 ENCOUNTER — LAB ENCOUNTER (OUTPATIENT)
Dept: LAB | Facility: HOSPITAL | Age: 83
End: 2023-10-21
Attending: NURSE PRACTITIONER

## 2023-10-21 DIAGNOSIS — R79.89 ELEVATED LFTS: ICD-10-CM

## 2023-10-21 DIAGNOSIS — I25.10 CAD (CORONARY ARTERY DISEASE), NATIVE CORONARY ARTERY: ICD-10-CM

## 2023-10-21 DIAGNOSIS — D64.9 ANEMIA: Primary | ICD-10-CM

## 2023-10-21 DIAGNOSIS — R06.02 SHORTNESS OF BREATH: ICD-10-CM

## 2023-10-21 DIAGNOSIS — E78.5 DYSLIPIDEMIA ASSOCIATED WITH TYPE 2 DIABETES MELLITUS: ICD-10-CM

## 2023-10-21 DIAGNOSIS — Z79.899 ENCOUNTER FOR LONG-TERM (CURRENT) DRUG USE: ICD-10-CM

## 2023-10-21 DIAGNOSIS — E11.9 DIABETES MELLITUS (HCC): ICD-10-CM

## 2023-10-21 DIAGNOSIS — E87.5 HYPERKALEMIA: ICD-10-CM

## 2023-10-21 DIAGNOSIS — E03.9 HYPOTHYROIDISM: ICD-10-CM

## 2023-10-21 DIAGNOSIS — E11.65 TYPE 2 DIABETES MELLITUS WITH HYPERGLYCEMIA, WITHOUT LONG-TERM CURRENT USE OF INSULIN (HCC): ICD-10-CM

## 2023-10-21 DIAGNOSIS — E11.69 DYSLIPIDEMIA ASSOCIATED WITH TYPE 2 DIABETES MELLITUS: ICD-10-CM

## 2023-10-21 LAB
ALBUMIN SERPL-MCNC: 3.7 G/DL (ref 3.4–5)
ALBUMIN/GLOB SERPL: 1 {RATIO} (ref 1–2)
ALP LIVER SERPL-CCNC: 94 U/L
ALT SERPL-CCNC: 26 U/L
ANION GAP SERPL CALC-SCNC: 2 MMOL/L (ref 0–18)
AST SERPL-CCNC: 24 U/L (ref 15–37)
BILIRUB SERPL-MCNC: 0.8 MG/DL (ref 0.1–2)
BUN BLD-MCNC: 28 MG/DL (ref 7–18)
CALCIUM BLD-MCNC: 9.5 MG/DL (ref 8.5–10.1)
CHLORIDE SERPL-SCNC: 111 MMOL/L (ref 98–112)
CO2 SERPL-SCNC: 24 MMOL/L (ref 21–32)
CREAT BLD-MCNC: 1.69 MG/DL
CREAT UR-SCNC: 60.7 MG/DL
EGFRCR SERPLBLD CKD-EPI 2021: 40 ML/MIN/1.73M2 (ref 60–?)
EST. AVERAGE GLUCOSE BLD GHB EST-MCNC: 235 MG/DL (ref 68–126)
FASTING STATUS PATIENT QL REPORTED: YES
GLOBULIN PLAS-MCNC: 3.7 G/DL (ref 2.8–4.4)
GLUCOSE BLD-MCNC: 150 MG/DL (ref 70–99)
HBA1C MFR BLD: 9.8 % (ref ?–5.7)
MICROALBUMIN UR-MCNC: 17.9 MG/DL
MICROALBUMIN/CREAT 24H UR-RTO: 294.9 UG/MG (ref ?–30)
OSMOLALITY SERPL CALC.SUM OF ELEC: 292 MOSM/KG (ref 275–295)
POTASSIUM SERPL-SCNC: 5.5 MMOL/L (ref 3.5–5.1)
PROT SERPL-MCNC: 7.4 G/DL (ref 6.4–8.2)
SODIUM SERPL-SCNC: 137 MMOL/L (ref 136–145)
T4 FREE SERPL-MCNC: 1.1 NG/DL (ref 0.8–1.7)
TSI SER-ACNC: 2.47 MIU/ML (ref 0.36–3.74)

## 2023-10-21 PROCEDURE — 36415 COLL VENOUS BLD VENIPUNCTURE: CPT

## 2023-10-21 PROCEDURE — 83036 HEMOGLOBIN GLYCOSYLATED A1C: CPT

## 2023-10-21 PROCEDURE — 84443 ASSAY THYROID STIM HORMONE: CPT | Performed by: FAMILY MEDICINE

## 2023-10-21 PROCEDURE — 82043 UR ALBUMIN QUANTITATIVE: CPT

## 2023-10-21 PROCEDURE — 84439 ASSAY OF FREE THYROXINE: CPT | Performed by: FAMILY MEDICINE

## 2023-10-21 PROCEDURE — 80053 COMPREHEN METABOLIC PANEL: CPT

## 2023-10-21 PROCEDURE — 82570 ASSAY OF URINE CREATININE: CPT

## 2023-10-23 ENCOUNTER — TELEPHONE (OUTPATIENT)
Dept: ENDOCRINOLOGY CLINIC | Facility: CLINIC | Age: 83
End: 2023-10-23

## 2023-10-23 NOTE — TELEPHONE ENCOUNTER
Call with Chalino Valentina to discuss labs. She previously talked to cardiology, states he has been sent to ER previously to correct, but this time they did not recommend ER. They will come in Wednesday for luis pro placement about 3:45 - 4 pm.  They will take 4:15 appt in [facility], will request appt be opened. Cancelled phone visit for Thursday.     Future Appointments   Date Time Provider Craig Be   10/25/2023  3:45 PM EMG HOSSEIN JULIO NURSE [facility] [address]   10/27/2023 11:30 AM Lisa Degroot MD [facility] [address]   11/1/2023  4:15 PM Reginaldo Norris Ra, APRN EMG[facility]ABTBBK [facility] [place]

## 2023-10-23 NOTE — TELEPHONE ENCOUNTER
Last A1c value was 9.8% done 10/21/2023.     Telephone visit is scheduled however given worsening DM control needs in person or telehealth appt  Also recommend Silvina pro to evaluate BG trends for at least 1 week before appt     Current DM regimen is not controlling his BG trends     Increase in potassium level, creatinine and decline in egfr - needs to lexis self PCP, cardiology     I can open 11-1-2023 in [address] at 4:15 pm

## 2023-10-25 ENCOUNTER — NURSE ONLY (OUTPATIENT)
Dept: ENDOCRINOLOGY CLINIC | Facility: CLINIC | Age: 83
End: 2023-10-25

## 2023-10-25 PROCEDURE — 95250 CONT GLUC MNTR PHYS/QHP EQP: CPT | Performed by: NURSE PRACTITIONER

## 2023-10-25 NOTE — PROGRESS NOTES
A continuous glucose sensor for 24 hour blood sugar monitoring was placed on patient today per APN order. Serial NUMBER:  [id number]    Exp date:  01/31/2024   After cleansing skin with alcohol prep, Sensor (F98)was inserted on Left Posterior upper arm area without difficulty. Small amount of skin prep was added around sensor tape after placement to help with sensor adhesive. Area remains free of any bleeding or irritation or pain at site when patient left DM center. Patient instructions:   Record daily food/drink intake and activity in log book  Call Diabetes center with any questions or concerns.    instructed to record food, exercise, insulin doses (if taking) on log provided

## 2023-10-26 ENCOUNTER — TELEPHONE (OUTPATIENT)
Facility: LOCATION | Age: 83
End: 2023-10-26

## 2023-10-26 DIAGNOSIS — E87.5 HIGH POTASSIUM: Primary | ICD-10-CM

## 2023-10-26 NOTE — TELEPHONE ENCOUNTER
Patient Daughter in law called to get medical clearance for Hearing aids. I will be faxing last office note to [phone].

## 2023-10-27 ENCOUNTER — OFFICE VISIT (OUTPATIENT)
Dept: FAMILY MEDICINE CLINIC | Facility: CLINIC | Age: 83
End: 2023-10-27

## 2023-10-27 ENCOUNTER — LAB ENCOUNTER (OUTPATIENT)
Dept: LAB | Facility: HOSPITAL | Age: 83
End: 2023-10-27
Attending: FAMILY MEDICINE

## 2023-10-27 VITALS
OXYGEN SATURATION: 99 % | HEART RATE: 52 BPM | BODY MASS INDEX: 24.75 KG/M2 | DIASTOLIC BLOOD PRESSURE: 62 MMHG | RESPIRATION RATE: 16 BRPM | HEIGHT: 66 IN | TEMPERATURE: 97 F | SYSTOLIC BLOOD PRESSURE: 142 MMHG | WEIGHT: 154 LBS

## 2023-10-27 DIAGNOSIS — E11.42 DIABETIC PERIPHERAL NEUROPATHY ASSOCIATED WITH TYPE 2 DIABETES MELLITUS (HCC): ICD-10-CM

## 2023-10-27 DIAGNOSIS — E87.5 HYPERKALEMIA: ICD-10-CM

## 2023-10-27 DIAGNOSIS — N18.32 TYPE 2 DIABETES MELLITUS WITH STAGE 3B CHRONIC KIDNEY DISEASE, WITHOUT LONG-TERM CURRENT USE OF INSULIN (HCC): Primary | ICD-10-CM

## 2023-10-27 DIAGNOSIS — E87.5 HIGH POTASSIUM: ICD-10-CM

## 2023-10-27 DIAGNOSIS — N18.32 STAGE 3B CHRONIC KIDNEY DISEASE (HCC): ICD-10-CM

## 2023-10-27 DIAGNOSIS — E11.22 TYPE 2 DIABETES MELLITUS WITH STAGE 3B CHRONIC KIDNEY DISEASE, WITHOUT LONG-TERM CURRENT USE OF INSULIN (HCC): Primary | ICD-10-CM

## 2023-10-27 LAB
ANION GAP SERPL CALC-SCNC: 5 MMOL/L (ref 0–18)
BUN BLD-MCNC: 35 MG/DL (ref 7–18)
CALCIUM BLD-MCNC: 9.4 MG/DL (ref 8.5–10.1)
CHLORIDE SERPL-SCNC: 108 MMOL/L (ref 98–112)
CO2 SERPL-SCNC: 23 MMOL/L (ref 21–32)
CREAT BLD-MCNC: 1.72 MG/DL
EGFRCR SERPLBLD CKD-EPI 2021: 39 ML/MIN/1.73M2 (ref 60–?)
FASTING STATUS PATIENT QL REPORTED: NO
GLUCOSE BLD-MCNC: 225 MG/DL (ref 70–99)
OSMOLALITY SERPL CALC.SUM OF ELEC: 297 MOSM/KG (ref 275–295)
POTASSIUM SERPL-SCNC: 4.9 MMOL/L (ref 3.5–5.1)
SODIUM SERPL-SCNC: 136 MMOL/L (ref 136–145)

## 2023-10-27 PROCEDURE — 3078F DIAST BP <80 MM HG: CPT | Performed by: STUDENT IN AN ORGANIZED HEALTH CARE EDUCATION/TRAINING PROGRAM

## 2023-10-27 PROCEDURE — 3008F BODY MASS INDEX DOCD: CPT | Performed by: STUDENT IN AN ORGANIZED HEALTH CARE EDUCATION/TRAINING PROGRAM

## 2023-10-27 PROCEDURE — 3077F SYST BP >= 140 MM HG: CPT | Performed by: STUDENT IN AN ORGANIZED HEALTH CARE EDUCATION/TRAINING PROGRAM

## 2023-10-27 PROCEDURE — 36415 COLL VENOUS BLD VENIPUNCTURE: CPT

## 2023-10-27 PROCEDURE — 99214 OFFICE O/P EST MOD 30 MIN: CPT | Performed by: STUDENT IN AN ORGANIZED HEALTH CARE EDUCATION/TRAINING PROGRAM

## 2023-10-27 PROCEDURE — 80048 BASIC METABOLIC PNL TOTAL CA: CPT

## 2023-10-27 RX ORDER — GABAPENTIN 300 MG/1
300 CAPSULE ORAL 2 TIMES DAILY PRN
Qty: 60 CAPSULE | Refills: 0 | Status: SHIPPED | OUTPATIENT
Start: 2023-10-27 | End: 2023-10-30 | Stop reason: DRUGHIGH

## 2023-10-27 RX ORDER — GABAPENTIN 600 MG/1
600 TABLET ORAL 2 TIMES DAILY
Qty: 60 TABLET | Refills: 1 | Status: SHIPPED
Start: 2023-10-27

## 2023-10-30 ENCOUNTER — OFFICE VISIT (OUTPATIENT)
Dept: ENDOCRINOLOGY CLINIC | Facility: CLINIC | Age: 83
End: 2023-10-30

## 2023-10-30 VITALS
WEIGHT: 151.81 LBS | HEART RATE: 79 BPM | SYSTOLIC BLOOD PRESSURE: 138 MMHG | DIASTOLIC BLOOD PRESSURE: 60 MMHG | RESPIRATION RATE: 17 BRPM | OXYGEN SATURATION: 97 % | BODY MASS INDEX: 25 KG/M2

## 2023-10-30 DIAGNOSIS — I25.10 CORONARY ARTERY DISEASE INVOLVING NATIVE CORONARY ARTERY OF NATIVE HEART, UNSPECIFIED WHETHER ANGINA PRESENT: ICD-10-CM

## 2023-10-30 DIAGNOSIS — G62.9 NEUROPATHY: ICD-10-CM

## 2023-10-30 DIAGNOSIS — E11.65 TYPE 2 DIABETES MELLITUS WITH HYPERGLYCEMIA, WITHOUT LONG-TERM CURRENT USE OF INSULIN (HCC): Primary | ICD-10-CM

## 2023-10-30 DIAGNOSIS — N18.31 STAGE 3A CHRONIC KIDNEY DISEASE (HCC): ICD-10-CM

## 2023-10-30 RX ORDER — DULAGLUTIDE 0.75 MG/.5ML
0.75 INJECTION, SOLUTION SUBCUTANEOUS WEEKLY
Qty: 2 ML | Refills: 1 | Status: SHIPPED | OUTPATIENT
Start: 2023-10-30 | End: 2023-10-30

## 2023-10-30 RX ORDER — DULAGLUTIDE 0.75 MG/.5ML
0.75 INJECTION, SOLUTION SUBCUTANEOUS WEEKLY
Qty: 2 ML | Refills: 1 | Status: SHIPPED | OUTPATIENT
Start: 2023-10-30

## 2023-11-16 ENCOUNTER — TELEPHONE (OUTPATIENT)
Dept: FAMILY MEDICINE CLINIC | Facility: CLINIC | Age: 83
End: 2023-11-16

## 2023-11-16 NOTE — TELEPHONE ENCOUNTER
Inga- Giovanny had to cancel eye surgery left eye today as patient is running a fever with chills, body aches, Cough and congestion. Sherif Shane Daughter would like a call to make sure she is giving him correct medication for symptoms.       Please call Ph. [phone]

## 2023-12-01 ENCOUNTER — OFFICE VISIT (OUTPATIENT)
Dept: NEPHROLOGY | Facility: CLINIC | Age: 83
End: 2023-12-01
Payer: MEDICAID

## 2023-12-01 VITALS — BODY MASS INDEX: 24 KG/M2 | DIASTOLIC BLOOD PRESSURE: 70 MMHG | WEIGHT: 149.5 LBS | SYSTOLIC BLOOD PRESSURE: 142 MMHG

## 2023-12-01 DIAGNOSIS — N18.30 STAGE 3 CHRONIC KIDNEY DISEASE, UNSPECIFIED WHETHER STAGE 3A OR 3B CKD (HCC): Primary | ICD-10-CM

## 2023-12-08 ENCOUNTER — VIRTUAL PHONE E/M (OUTPATIENT)
Dept: FAMILY MEDICINE CLINIC | Facility: CLINIC | Age: 83
End: 2023-12-08
Payer: MEDICAID

## 2023-12-08 DIAGNOSIS — E11.42 DIABETIC POLYNEUROPATHY ASSOCIATED WITH TYPE 2 DIABETES MELLITUS (HCC): ICD-10-CM

## 2023-12-08 DIAGNOSIS — T50.905A MEDICATION SIDE EFFECTS PRESENT, INITIAL ENCOUNTER: Primary | ICD-10-CM

## 2023-12-08 NOTE — PROGRESS NOTES
This is a telemedicine visit with live, interactive video and/or audio. Patient understands and accepts financial responsibility for any deductible, co-insurance and/or co-pays associated with this service. SUBJECTIVE  This is a 81year old male who is on a phone call visit using his daughter in law to speak for him. Patient is having a severe weakness, tiredness, difficulty talking, and difficulty walking while taking the increased dose of gabapentin. Patient went from 300mg to 600mg bid and the side effects started, patient stopped the medication, and the side effects went away. Results for orders placed or performed in visit on 62/74/56   Basic Metabolic Panel (8) [E]   Result Value Ref Range    Glucose 225 (H) 70 - 99 mg/dL    Sodium 136 136 - 145 mmol/L    Potassium 4.9 3.5 - 5.1 mmol/L    Chloride 108 98 - 112 mmol/L    CO2 23.0 21.0 - 32.0 mmol/L    Anion Gap 5 0 - 18 mmol/L    BUN 35 (H) 7 - 18 mg/dL    Creatinine 1.72 (H) 0.70 - 1.30 mg/dL    Calcium, Total 9.4 8.5 - 10.1 mg/dL    Calculated Osmolality 297 (H) 275 - 295 mOsm/kg    eGFR-Cr 39 (L) >=60 mL/min/1.73m2    Patient Fasting for BMP? No        HISTORY:  Past Medical History:   Diagnosis Date    Arrhythmia     AFIB    ASHD (arteriosclerotic heart disease)     Atherosclerosis of coronary artery     Carotid artery stenosis     Cataract     Coronary atherosclerosis     Heart attack (HCC)     High blood pressure     High cholesterol     History of 2019 novel coronavirus disease (COVID-19) 12/26/2021    cough, sore throat, headache, fever, body aches; no hospitalization, no lingering symptoms    Stroke ([address]     no residual deficits    Type II or unspecified type diabetes mellitus without mention of complication, not stated as uncontrolled     Visual impairment     glasses.  rupture vessel of R eye      Past Surgical History:   Procedure Laterality Date    ANGIOGRAM      ANGIOPLASTY (CORONARY)      CATARACT      CATH DRUG ELUTING STENT      CATH PERCUTANEOUS  TRANSLUMINAL CORONARY ANGIOPLASTY      COLONOSCOPY N/A 1/9/2020    Procedure: COLONOSCOPY;  Surgeon: Chirag Parson MD;  Location: [address] [place] [address] ENDOSCOPY    NEEDLE BIOPSY LIVER  03/2022= Cirrhosis    Repeat MRI Abd 3/23      Family History   Problem Relation Age of Onset    Hypertension Mother     Cancer Brother       Social History     Socioeconomic History    Marital status:    Tobacco Use    Smoking status: Never    Smokeless tobacco: Never   Vaping Use    Vaping Use: Never used   Substance and Sexual Activity    Alcohol use: No    Drug use: No        No Known Allergies   Current Outpatient Medications   Medication Sig Dispense Refill    Dulaglutide (TRULICITY) 7.71 US/9.0EC Subcutaneous Solution Pen-injector Inject 0.75 mg into the skin once a week. 2 mL 1    gabapentin 600 MG Oral Tab Take 1 tablet (600 mg total) by mouth 2 (two) times daily. (Patient taking differently: Take 0.5 tablets (300 mg total) by mouth 2 (two) times daily.) 60 tablet 1    dapagliflozin (FARXIGA) 10 MG Oral Tab Take 1 tablet (10 mg total) by mouth daily. 90 tablet 0    Glucose Blood (ONETOUCH VERIO) In Vitro Strip Test blood sugar once daily, E11.65, no current insulin use 100 strip 3    Multiple Vitamins-Minerals (MULTIVITAMIN ADULTS OR) Take 1 tablet by mouth daily. (Patient not taking: Reported on 12/1/2023)      hydrALAZINE 100 MG Oral Tab Take 1 tablet (100 mg total) by mouth. 100mg in the AM and 50mg in the PM  0    polymyxin B-trimethoprim 20889-2.1 UNIT/ML-% Ophthalmic Solution 1 DROP IN AFFECTED EYE THREE TIMES A DAY FOR UPTO 7 DAYS (Patient not taking: Reported on 12/1/2023) 10 mL 0    Blood Glucose Monitoring Suppl ([facility]) w/Device Does not apply Kit 1 each by Does not apply route 4 (four) times daily. 1 kit 0    OneTouch Delica Lancets 01C Does not apply Misc 1 each by Does not apply route 4 (four) times daily.  400 each 0    acetaminophen 325 MG Oral Tab Take 1 tablet (325 mg total) by mouth every 6 (six) hours as needed for Pain. Clopidogrel Bisulfate 75 MG Oral Tab Take 1 tablet (75 mg total) by mouth daily. rivaroxaban 15 MG Oral Tab Take 1 tablet (15 mg total) by mouth daily with food. OBJECTIVE  Physical Exam:   No physical examination done. ASSESSMENT & PLAN  Diagnoses and all orders for this visit:    Medication side effects present, initial encounter    Diabetic polyneuropathy associated with type 2 diabetes mellitus ([address]    Stop the 600mg gabapentin continue or stop the 300 mg dose if needed. Follow up with diabetic clinic. Would like to speak with Dr. Nicol Skelton. Follow Up:  As needed. Plan discussed with patient. All questions answered. Patient is agreeable to this plan of care. Time spent on telephone call and encounter 20  min.        NORA Saavedra

## 2023-12-21 RX ORDER — DULAGLUTIDE 0.75 MG/.5ML
1 INJECTION, SOLUTION SUBCUTANEOUS
Qty: 2 ML | Refills: 1 | Status: SHIPPED | OUTPATIENT
Start: 2023-12-21

## 2023-12-21 NOTE — TELEPHONE ENCOUNTER
Requested Prescriptions     Pending Prescriptions Disp Refills    TRULICITY 9.22 II/3.8NF Subcutaneous Solution Pen-injector [Pharmacy Med Name: Trulicity 3.64 OQ/5.3BB Subcutaneous Solution Pen-injector] 4 mL 0     Sig: INJECT 1 SYRINGE SUBCUTANEOUSLY ONCE A WEEK     Your appointments       Date & Time Appointment Department [facility])    Jan 11, 2024  2:15 PM CST [facility] [address] [place] [address] ( [address])    You have been scheduled for a Virtual Telephone visit with your provider. Please be available at your phone so that your physician can contact you, and be prepared with any questions or concerns. You may be billed a copay at a later time, depending upon your insurance. As always, your health is our priority.          Mar 08, 2024  9:00 AM CST Exam - Established with Sheela Rivers MD [place] [address] Nephrology ([address] [facility])              [facility] Neph[facility]  [address] [facility]  [address] [place] [address] [address] [facility] [address]  [address] [facility] [address] DIABETES [place] [address] [place] [address] [phone]          HGBA1C:    Lab Results   Component Value Date    A1C 9.8 (H) 10/21/2023    A1C 9.3 (H) 05/05/2023    A1C 8.5 (H) 02/04/2023     (H) 10/21/2023     LOV: 10-30-23    REFILL: 10-30-23

## 2024-01-03 ENCOUNTER — TELEPHONE (OUTPATIENT)
Dept: ENDOCRINOLOGY CLINIC | Facility: CLINIC | Age: 84
End: 2024-01-03

## 2024-01-03 NOTE — TELEPHONE ENCOUNTER
Pt no active w my chart  Has orders from Dr Melchor for labs, including A1C  Has telehealth appt next week. Needs labs updated prior to appt

## 2024-01-06 ENCOUNTER — LAB ENCOUNTER (OUTPATIENT)
Dept: LAB | Facility: HOSPITAL | Age: 84
End: 2024-01-06
Attending: INTERNAL MEDICINE
Payer: MEDICAID

## 2024-01-06 DIAGNOSIS — N18.30 STAGE 3 CHRONIC KIDNEY DISEASE, UNSPECIFIED WHETHER STAGE 3A OR 3B CKD (HCC): ICD-10-CM

## 2024-01-06 LAB
ANION GAP SERPL CALC-SCNC: 5 MMOL/L (ref 0–18)
BASOPHILS # BLD AUTO: 0.02 X10(3) UL (ref 0–0.2)
BASOPHILS NFR BLD AUTO: 0.3 %
BILIRUB UR QL STRIP.AUTO: NEGATIVE
BUN BLD-MCNC: 32 MG/DL (ref 9–23)
CALCIUM BLD-MCNC: 8.9 MG/DL (ref 8.5–10.1)
CHLORIDE SERPL-SCNC: 105 MMOL/L (ref 98–112)
CLARITY UR REFRACT.AUTO: CLEAR
CO2 SERPL-SCNC: 25 MMOL/L (ref 21–32)
COLOR UR AUTO: YELLOW
CREAT BLD-MCNC: 1.7 MG/DL
CREAT UR-SCNC: 67.8 MG/DL
EGFRCR SERPLBLD CKD-EPI 2021: 40 ML/MIN/1.73M2 (ref 60–?)
EOSINOPHIL # BLD AUTO: 0.05 X10(3) UL (ref 0–0.7)
EOSINOPHIL NFR BLD AUTO: 0.7 %
ERYTHROCYTE [DISTWIDTH] IN BLOOD BY AUTOMATED COUNT: 19.9 %
EST. AVERAGE GLUCOSE BLD GHB EST-MCNC: 212 MG/DL (ref 68–126)
FASTING STATUS PATIENT QL REPORTED: NO
GLUCOSE BLD-MCNC: 251 MG/DL (ref 70–99)
GLUCOSE UR STRIP.AUTO-MCNC: >1000 MG/DL
HBA1C MFR BLD: 9 % (ref ?–5.7)
HCT VFR BLD AUTO: 43.9 %
HGB BLD-MCNC: 13.9 G/DL
IMM GRANULOCYTES # BLD AUTO: 0.03 X10(3) UL (ref 0–1)
IMM GRANULOCYTES NFR BLD: 0.4 %
KETONES UR STRIP.AUTO-MCNC: NEGATIVE MG/DL
LEUKOCYTE ESTERASE UR QL STRIP.AUTO: NEGATIVE
LYMPHOCYTES # BLD AUTO: 2.18 X10(3) UL (ref 1–4)
LYMPHOCYTES NFR BLD AUTO: 30.9 %
MAGNESIUM SERPL-MCNC: 2.8 MG/DL (ref 1.6–2.6)
MCH RBC QN AUTO: 20.4 PG (ref 26–34)
MCHC RBC AUTO-ENTMCNC: 31.7 G/DL (ref 31–37)
MCV RBC AUTO: 64.4 FL
MONOCYTES # BLD AUTO: 0.69 X10(3) UL (ref 0.1–1)
MONOCYTES NFR BLD AUTO: 9.8 %
NEUTROPHILS # BLD AUTO: 4.09 X10 (3) UL (ref 1.5–7.7)
NEUTROPHILS # BLD AUTO: 4.09 X10(3) UL (ref 1.5–7.7)
NEUTROPHILS NFR BLD AUTO: 57.9 %
NITRITE UR QL STRIP.AUTO: NEGATIVE
OSMOLALITY SERPL CALC.SUM OF ELEC: 295 MOSM/KG (ref 275–295)
PH UR STRIP.AUTO: 5 [PH] (ref 5–8)
PHOSPHATE SERPL-MCNC: 3.3 MG/DL (ref 2.5–4.9)
PLATELET # BLD AUTO: 123 10(3)UL (ref 150–450)
POTASSIUM SERPL-SCNC: 5.1 MMOL/L (ref 3.5–5.1)
PROT UR STRIP.AUTO-MCNC: NEGATIVE MG/DL
PROT UR-MCNC: 18.8 MG/DL
PTH-INTACT SERPL-MCNC: 102.9 PG/ML (ref 18.5–88)
RBC # BLD AUTO: 6.82 X10(6)UL
RBC UR QL AUTO: NEGATIVE
SODIUM SERPL-SCNC: 135 MMOL/L (ref 136–145)
SP GR UR STRIP.AUTO: 1.03 (ref 1–1.03)
UROBILINOGEN UR STRIP.AUTO-MCNC: NORMAL MG/DL
VIT D+METAB SERPL-MCNC: 35.6 NG/ML (ref 30–100)
WBC # BLD AUTO: 7.1 X10(3) UL (ref 4–11)

## 2024-01-06 PROCEDURE — 82570 ASSAY OF URINE CREATININE: CPT

## 2024-01-06 PROCEDURE — 85025 COMPLETE CBC W/AUTO DIFF WBC: CPT

## 2024-01-06 PROCEDURE — 83036 HEMOGLOBIN GLYCOSYLATED A1C: CPT

## 2024-01-06 PROCEDURE — 84100 ASSAY OF PHOSPHORUS: CPT

## 2024-01-06 PROCEDURE — 82306 VITAMIN D 25 HYDROXY: CPT

## 2024-01-06 PROCEDURE — 83970 ASSAY OF PARATHORMONE: CPT

## 2024-01-06 PROCEDURE — 81003 URINALYSIS AUTO W/O SCOPE: CPT

## 2024-01-06 PROCEDURE — 83735 ASSAY OF MAGNESIUM: CPT

## 2024-01-06 PROCEDURE — 84156 ASSAY OF PROTEIN URINE: CPT

## 2024-01-06 PROCEDURE — 36415 COLL VENOUS BLD VENIPUNCTURE: CPT

## 2024-01-06 PROCEDURE — 80048 BASIC METABOLIC PNL TOTAL CA: CPT

## 2024-01-08 RX ORDER — LINAGLIPTIN 5 MG/1
5 TABLET, FILM COATED ORAL DAILY
Qty: 90 TABLET | Refills: 0 | OUTPATIENT
Start: 2024-01-08

## 2024-01-11 ENCOUNTER — VIRTUAL PHONE E/M (OUTPATIENT)
Dept: ENDOCRINOLOGY CLINIC | Facility: CLINIC | Age: 84
End: 2024-01-11
Payer: MEDICAID

## 2024-01-11 DIAGNOSIS — N18.31 STAGE 3A CHRONIC KIDNEY DISEASE (HCC): ICD-10-CM

## 2024-01-11 DIAGNOSIS — E11.65 TYPE 2 DIABETES MELLITUS WITH HYPERGLYCEMIA, WITHOUT LONG-TERM CURRENT USE OF INSULIN (HCC): Primary | ICD-10-CM

## 2024-01-11 DIAGNOSIS — E78.5 DYSLIPIDEMIA: ICD-10-CM

## 2024-01-11 DIAGNOSIS — I10 ESSENTIAL HYPERTENSION: ICD-10-CM

## 2024-01-11 DIAGNOSIS — I25.10 CORONARY ARTERY DISEASE INVOLVING NATIVE CORONARY ARTERY OF NATIVE HEART, UNSPECIFIED WHETHER ANGINA PRESENT: ICD-10-CM

## 2024-01-11 PROCEDURE — 99443 PHONE E/M BY PHYS 21-30 MIN: CPT | Performed by: NURSE PRACTITIONER

## 2024-01-11 RX ORDER — DULAGLUTIDE 1.5 MG/.5ML
1.5 INJECTION, SOLUTION SUBCUTANEOUS WEEKLY
Qty: 2 ML | Refills: 1 | Status: SHIPPED | OUTPATIENT
Start: 2024-01-11

## 2024-01-11 NOTE — PROGRESS NOTES
Due to COVID-19 ACTION PLAN, the patient's office visit was converted to a video visit. Please note that the following visit was completed using two-way, real-time interactive audio  communication.  Time Spent:  21 min       Virtual/Telephone Check-In     Oneal Luna verbally consents to a Virtual/Telephone Check-In service   Patient understands and accepts financial responsibility for any deductible, co-insurance and/or co-pays associated with this service.         No chief complaint on file.        Oneal is a 83 year old for type 2 DM medication management. Last DM appt    Most recent A1c 9.0% ( last A1C 9.8%)     Tradjenta was stopped. Trulicity was started. No side effects  Family feels his trneds are improving except on days he eats too much   Testing only fasting  Reviewed past [employer] pro cgm and was high > 180 12 pm - 12AM from food intake     Fasting trends :   1-11 -2024 187  1- 155   1-9-2024 156   1-8-2024 166   1-7--2024 143   1-6-2024 115    1-5-2024 161    Pt is able to self inject w autoinjector device     Hx  liver cirrhosis after liver biopsy but daughter reports no active issues.     Has upcming appt w nephrology       Diabetes History:  Type 2 DM; ~ 2004    Patient has not had hospitalizations for blood sugar issues  denies any history of pancreatitis    Previous DM therapies:  metaglip /glimepiride : hypoglycemia   Glimepiride : hypoglycemia  Glipizide/Tradjenta : started trulicity     Metformin : liver disease     Current DM Regimen:  Trulicity 0.75mg subcutaneous once weekly     Farxiga 10mg once daily in AM ( 9 am )       HGBA1C:    Lab Results   Component Value Date    A1C 9.0 (H) 01/06/2024    A1C 9.8 (H) 10/21/2023    A1C 9.3 (H) 05/05/2023     (H) 01/06/2024       Lab Results   Component Value Date    CHOLEST 134 05/05/2023    TRIG 100 05/05/2023    HDL 36 (L) 05/05/2023    LDL 79 05/05/2023    MICROALBCREA 294.9 (H) 10/21/2023    CREATSERUM 1.70 (H)  01/06/2024    EGFRCR 40 (L) 01/06/2024    AST 24 10/21/2023    ALT 26 10/21/2023     Lab Results   Component Value Date    TSH 2.470 10/21/2023    T4F 1.1 10/21/2023     Component      Latest Ref Rng 2/4/2023   Vitamin B12      193 - 986 pg/mL 346              DM Complications:  Microvascular:   Neuropathy: yes  Retinopathy: no  Nephropathy: yes    Macrovascular:  PVD: no  CAD: yes, + stent. +AFIB   Stroke/CVA yes     Modifying factors:  Medication adherence: yes  Recent steroids, illness or infections: no    Allergies: Patient has no known allergies.     Current Outpatient Medications   Medication Sig Dispense Refill    Dulaglutide (TRULICITY) 1.5 MG/0.5ML Subcutaneous Solution Pen-injector Inject 1.5 mg into the skin once a week. 2 mL 1    gabapentin 600 MG Oral Tab Take 1 tablet (600 mg total) by mouth 2 (two) times daily. (Patient taking differently: Take 0.5 tablets (300 mg total) by mouth 2 (two) times daily.) 60 tablet 1    dapagliflozin (FARXIGA) 10 MG Oral Tab Take 1 tablet (10 mg total) by mouth daily. 90 tablet 0    Glucose Blood (ONETOUCH VERIO) In Vitro Strip Test blood sugar once daily, E11.65, no current insulin use 100 strip 3    Multiple Vitamins-Minerals (MULTIVITAMIN ADULTS OR) Take 1 tablet by mouth daily. (Patient not taking: Reported on 12/1/2023)      hydrALAZINE 100 MG Oral Tab Take 1 tablet (100 mg total) by mouth. 100mg in the AM and 50mg in the PM  0    polymyxin B-trimethoprim 64059-0.1 UNIT/ML-% Ophthalmic Solution 1 DROP IN AFFECTED EYE THREE TIMES A DAY FOR UPTO 7 DAYS (Patient not taking: Reported on 12/1/2023) 10 mL 0    Blood Glucose Monitoring Suppl (ONETOUCH VERIO) w/Device Does not apply Kit 1 each by Does not apply route 4 (four) times daily. 1 kit 0    OneTouch Delica Lancets 33G Does not apply Misc 1 each by Does not apply route 4 (four) times daily. 400 each 0    acetaminophen 325 MG Oral Tab Take 1 tablet (325 mg total) by mouth every 6 (six) hours as needed for Pain.       Clopidogrel Bisulfate 75 MG Oral Tab Take 1 tablet (75 mg total) by mouth daily.      rivaroxaban 15 MG Oral Tab Take 1 tablet (15 mg total) by mouth daily with food.           Past Medical History:   Diagnosis Date    Arrhythmia     AFIB    ASHD (arteriosclerotic heart disease)     Atherosclerosis of coronary artery     Carotid artery stenosis     Cataract     Coronary atherosclerosis     Heart attack (HCC)     High blood pressure     High cholesterol     History of 2019 novel coronavirus disease (COVID-19) 12/26/2021    cough, sore throat, headache, fever, body aches; no hospitalization, no lingering symptoms    Stroke (HCC)     no residual deficits    Type II or unspecified type diabetes mellitus without mention of complication, not stated as uncontrolled     Visual impairment     glasses. rupture vessel of R eye     Past Surgical History:   Procedure Laterality Date    ANGIOGRAM      ANGIOPLASTY (CORONARY)      CATARACT      CATH DRUG ELUTING STENT      CATH PERCUTANEOUS  TRANSLUMINAL CORONARY ANGIOPLASTY      COLONOSCOPY N/A 1/9/2020    Procedure: COLONOSCOPY;  Surgeon: Arpan Solorzano MD;  Location:  ENDOSCOPY    NEEDLE BIOPSY LIVER  03/2022= Cirrhosis    Repeat MRI Abd 3/23     Social History     Socioeconomic History    Marital status:    Tobacco Use    Smoking status: Never    Smokeless tobacco: Never   Vaping Use    Vaping Use: Never used   Substance and Sexual Activity    Alcohol use: No    Drug use: No     Family History   Problem Relation Age of Onset    Hypertension Mother     Cancer Brother          DM associated review of  symptoms:   Endocrine: Polyuria, polyphagia, polydipsia: no  Neurological: Paresthesias: yes  HEENT: Blurred vision: no  Skin: no rash or wounds.   Hematological: Hypoglycemia: yes 30 min on luis pro cgm     Review of Systems   Respiratory: Negative for shortness of breath.    Cardiovascular: Negative for chest pain.   Gastrointestinal: Negative for nausea, diarrhea and  constipation.    M/S : + painful feet       Physical exam:  There were no vitals taken for this visit.  There is no height or weight on file to calculate BMI.  No conversational dyspnea        Assessment/Plan:  CAD/CHF   Maintain w cardiology - Dr Jane Fountain   Tolerating  Farxiga rx  Continue  GLP1 given the  ASCVD benefits of this class as well as impact on glycemic control     CKD stage 3  The DAPA-CKD trial showed that dapagliflozin results in improvement on renal function and mortality among patients with CKD, irrespective of DM status.  Continue  farxiga - see DM but given egfr < 45, not as impactful on glycemic targets.   Seeing nephrology soon       Type 2 diabetes mellitus with microalbuminuria, without long-term current use of insulin (HCC)  A1C 9.0% ( last A1C 9.8%)  Weight: 151 lb ( last weight 158 lb)   Diabetes control improved but still suboptimal       Per 2024 ADA standards of care for older population,  at this time it may be important for us to avoid hypoglycemia and slightly higher blood glucose levels should be accepted because we are more interested in preventing serious hypoglycemia episodes and side effects versus long term complications.Older adults who are otherwise healthy with few coexisting chronic illnesses and intact cognitive function and functional status should have lower glycemic goals (such as A1C <7.0-7.5% while those with multiple coexisting chronic illnesses, cognitive impairment, or functional dependence should have less stringent glycemic goals (such as A1C <8.0%)     Increase Trulicity 0.75mg--> 1.5mg once weekly     Continue   Farxiga 10mg once daily in AM   Again discussed increase SMBG 2 x daily to check trends later in day     In past declined insulin rx   Reluctant to use SPARKS given high risk of hypoglycemia       Reminded pt on A1C and blood sugar targets (Fasting 95- 140 and post prandial <200 ) and complications associated with hyperglycemia and uncontrolled DM (on AVS)    Reviewed s/s and treatment of hypoglycemia (on AVS)   Continue with lifestyle modifications   Check in 4- 6 weeks but call if 12pm - 8 pm trends over 200 mg/dl     The patient is asked to return in 4 m but recommended to contact DM clinic sooner if questions or concerns.        The patient indicates understanding of these issues and agrees to the plan.       Orders Placed This Encounter    Dulaglutide (TRULICITY) 1.5 MG/0.5ML Subcutaneous Solution Pen-injector     Sig: Inject 1.5 mg into the skin once a week.     Dispense:  2 mL     Refill:  1     Cancel 0.75mg dose   The risks and benefits of my recommendations, as well as other treatment options were discussed with the patient today. questions were also answered to the best of my knowledge.       This has been done in good tylor to provide continuity of care in the best interest of the provider-patient relationship, due to the on-going public health crisis/national emergency and because of restrictions of visitation.  There are limitations of this visit as no or only very limited physical exam could be performed.  Every conscious effort was taken to allow for sufficient and adequate time.  This billing visit was spent on reviewing blood sugar trends, DM related labs, medications and decision making.  Appropriate medical decision-making and tests are ordered as detailed in the plan of care above.      Oneal Luna understands phone or video evaluation is not a substitute for face-to-face examination or emergency care. Patient advised to go to ER or call 911 for worsening symptoms or acute distress.     Marlin Norris, APRN

## 2024-01-11 NOTE — PATIENT INSTRUCTIONS
Increase trulicity to 1.5mg once weekly   Continue Farxiga once daily       blood sugar targets:     Fasting blood sugar (before breakfast) Target:    (ideally less than 110)  2 hours after eating less than 180 (ideally less than  150 )     Call for blood sugars  greater than  200 more than 2 times in a week

## 2024-01-22 ENCOUNTER — TELEPHONE (OUTPATIENT)
Dept: ENDOCRINOLOGY CLINIC | Facility: CLINIC | Age: 84
End: 2024-01-22

## 2024-01-22 RX ORDER — DULAGLUTIDE 0.75 MG/.5ML
0.75 INJECTION, SOLUTION SUBCUTANEOUS WEEKLY
Qty: 2 ML | Refills: 0 | Status: SHIPPED | OUTPATIENT
Start: 2024-01-22

## 2024-01-22 NOTE — TELEPHONE ENCOUNTER
Called family about weight loss concern     Daughter does not want to stop Trulicity  Feels his weight loss has been since diagnosed with liver disease     Family reports 5 # decrease since starting Trulicity   Will hold off on trulicity dose increase; continue Trulicity 0.75mg subcutaneous once weekly   Again reviewed that hyperglycemia can lead to weight loss - so luis pro will review how his srneds are doing on current DM regimen   Daughter agreed to plan     Orders Placed This Encounter    Dulaglutide (TRULICITY) 0.75 MG/0.5ML Subcutaneous Solution Pen-injector     Sig: Inject 0.75 mg into the skin once a week.     Dispense:  2 mL     Refill:  0     Cancel 1.5mg rx

## 2024-01-22 NOTE — TELEPHONE ENCOUNTER
Requested Prescriptions     Pending Prescriptions Disp Refills    FARXIGA 10 MG Oral Tab [Pharmacy Med Name: Farxiga 10 MG Oral Tablet] 90 tablet 0     Sig: Take 1 tablet by mouth once daily     Your appointments       Date & Time Appointment Department ([place])    Mar 08, 2024  9:00 AM CST Exam - Established with Nav Melchor MD [facility] ([facility])        May 21, 2024  5:15 PM CDT Diabetes Pump follow up with Marlin Norris APRN [address] ([address] DIABETES [place])              [facility]  [facility]  [address]  [facility] [address] [phone] [address]  [address] [facility] [address]  [facility] [id number] [phone]          Last A1c value was 9% done 1/6/2024.    Refill 10/12/23  LOV 1/11/24 tele

## 2024-01-22 NOTE — TELEPHONE ENCOUNTER
Patient daughter in law Lizbet called in stating patient weight is still decreasing. States she spoke to someone here and lvm at the educators line about patients weight decreasing stating patient was   175 and has decreasing to 145/144 lb 20 lbs less is concerns about weight lose states they are checking patient BG in morning averaging around 150-190 in AM fasting. They are watching patient carbs and diet but not sure what else to do. Patient daughter in law states she LVM under educator and nurse line dont see it nor do we have access to educator line .

## 2024-01-22 NOTE — TELEPHONE ENCOUNTER
Called patient daughter in law states weight loss did start with trulicity daughter er in law states pt was imitated to the hospital LAC / LAT number were high hospital and was discharged with a medication (not able to give name of medication) but weight loss is not from trulicity       Am confused with this message patient daughter in law mentioned trulicity bring the cause of the weight loss and then now states it wasn't because of that??

## 2024-01-22 NOTE — TELEPHONE ENCOUNTER
Pt did not start Trulicity 1.5 mg dose      Suggest sensor pro to evaluate BG trends since hyperglycemia can lead to weight loss   Last A1c value was 9% done 1/6/2024.    Hold Trulicity but will most likely need alternate agent for DM control after meals   Refer to RD for MNT   Also PCP follow up as this reported weight loss is not consistent w Trulicity effect (typical weight loss is 10lb )

## 2024-01-24 ENCOUNTER — TELEPHONE (OUTPATIENT)
Dept: ENDOCRINOLOGY CLINIC | Facility: CLINIC | Age: 84
End: 2024-01-24

## 2024-01-24 NOTE — TELEPHONE ENCOUNTER
Daughter Lizbet called, left message. Returned call.  Pt MANOLO had somce questions about carb intake.  Reviewed carb intake for meals is normally 30-45 g of carb, and 15 g of carbs for meals.  Asked about him taking premier protein which has 4 g of carbs, told her from a diabetes standpoint, that is low carb and would be a good snack.

## 2024-01-25 ENCOUNTER — TELEPHONE (OUTPATIENT)
Dept: FAMILY MEDICINE CLINIC | Facility: CLINIC | Age: 84
End: 2024-01-25

## 2024-01-25 NOTE — TELEPHONE ENCOUNTER
Received DM eye exam dated 1/24/24 from Dr. Boone Gill at [facility].  Report has been abstracted,  Report placed on MD bin for review.

## 2024-01-26 ENCOUNTER — NURSE ONLY (OUTPATIENT)
Dept: ENDOCRINOLOGY CLINIC | Facility: CLINIC | Age: 84
End: 2024-01-26
Payer: MEDICAID

## 2024-01-26 ENCOUNTER — TELEPHONE (OUTPATIENT)
Dept: ENDOCRINOLOGY CLINIC | Facility: CLINIC | Age: 84
End: 2024-01-26

## 2024-01-26 NOTE — PROGRESS NOTES
A continuous glucose sensor for 24 hour blood sugar monitoring was placed on patient today per APN order.   Serial NUMBER:  [id number]        Exp date:  06/30/24  After cleansing skin with alcohol prep, Sensor (F98)was inserted on   Posterior upper arm area without difficulty. Small amount of skin prep was added around sensor tape after placement to help with sensor adhesive.    Area remains free of any bleeding or irritation or pain at site when patient left DM center.  Patient instructions:   Record daily food/drink intake and activity in log book  Call Diabetes center with any questions or concerns.   instructed to record food, exercise, insulin doses (if taking) on log provided

## 2024-01-26 NOTE — TELEPHONE ENCOUNTER
Patient to hold Trulicity dose this Sunday and Marlin MELENDEZ will review and address possible medication changes when she returns to office on Monday.    Hali MELENDEZ, BC-ADM, [facility]

## 2024-01-26 NOTE — TELEPHONE ENCOUNTER
Pt's daughter called abt side effects since he has started trulicity. They would like to stop trulicity as he has been having a lot of diarrhea and he has been losing a lot of weight which has been making him weak. The daughter doesn't feel that his sugars have been impacted much since going on the trulicity. She mentioned he was previously on glipizide and they were wanting to go back to the oral meds vs. Injections.    Currently:  Trulicity injection weekly  Farxiga 10mg daily in AM    Previous meds from OV 10/30/23:  Glipizide XL  2.5mg : 1 tab twice daily  ( 9 am,  7pm)   Tradjenta 5mg once daily  In AM ( 9: 30 am )   Farxiga 10mg once daily in AM ( 9 am )    Pt is supposed to take next injection Sunday so she was wanting to know what to do and will need rx sent in depending    Call back #[phone]

## 2024-01-29 RX ORDER — GLIPIZIDE 2.5 MG/1
TABLET, EXTENDED RELEASE ORAL
Qty: 180 TABLET | Refills: 0 | OUTPATIENT
Start: 2024-01-29

## 2024-01-29 NOTE — TELEPHONE ENCOUNTER
Call with pt's DIL.  Advised he can restart Glipizide ER 2.5 mg one tablet with breakfast and dinner.  Provided appt date and time for luis pro removal.

## 2024-01-29 NOTE — TELEPHONE ENCOUNTER
Noted.   Pt can resume glipizide XL 2.5mg twice daily     Currently wearing  luis pro to evalulate glucose trends   When he was taking glipizide, A1C was 9% which is why trulicity was started        Last A1c value was 9% done 1/6/2024.

## 2024-02-09 ENCOUNTER — TELEPHONE (OUTPATIENT)
Dept: ENDOCRINOLOGY CLINIC | Facility: CLINIC | Age: 84
End: 2024-02-09

## 2024-02-09 ENCOUNTER — NURSE ONLY (OUTPATIENT)
Dept: ENDOCRINOLOGY CLINIC | Facility: CLINIC | Age: 84
End: 2024-02-09
Payer: MEDICAID

## 2024-02-09 DIAGNOSIS — E11.65 TYPE 2 DIABETES MELLITUS WITH HYPERGLYCEMIA, WITHOUT LONG-TERM CURRENT USE OF INSULIN (HCC): Primary | ICD-10-CM

## 2024-02-09 NOTE — TELEPHONE ENCOUNTER
Please instruct pt to reduce Glipizide to 2.5 mg at breakfast only until CB reviews CGM next week

## 2024-02-09 NOTE — TELEPHONE ENCOUNTER
Current medications as below:    Farxiga 10 mg daily  Trulicity 0.75 mg     Restarted Glipizide 2.5 mg at breakfast and dinner on 1/29/24

## 2024-02-09 NOTE — TELEPHONE ENCOUNTER
Called daughter in law to review adjustment, confirmed this adjustment, and discussed that Marlin will look further after reviewing when back in office on Monday.     Daughter in law also mentioned that the pt does add a supplement to his water, not entirely sure what it is, that is supposed to help with diabetes, was not sure if maybe that was also affecting his glucose and causing low readings.   She also asked about pt being able to get a CGM so that they would be able to see pt's numbers. Informed her that it may not be covered since pt is on Medicaid and not on insulin, she asked if maybe it would be possible to send orders and then see how much it would cost at pharmacy to pay OOP.

## 2024-02-11 RX ORDER — GLIPIZIDE 2.5 MG/1
TABLET, EXTENDED RELEASE ORAL
Qty: 90 TABLET | Refills: 0 | Status: SHIPPED | OUTPATIENT
Start: 2024-02-11

## 2024-02-11 RX ORDER — BLOOD-GLUCOSE SENSOR
1 EACH MISCELLANEOUS
Qty: 2 EACH | Refills: 3 | Status: SHIPPED | OUTPATIENT
Start: 2024-02-11

## 2024-02-11 NOTE — TELEPHONE ENCOUNTER
LILLY CGM Analysis of data: January 26, 2024 thru Feb 9, 2024    Sensor download: full report in media  Average glucose : 146 mg/dl       CV (coefficient of variation) : 42.9%   GMI (estimated A1C) 6.8 %    28% time above 180mg /dl  ( last download: -%)      4% time above 250 mg/dl ( last download: -%)      54% time in target range:  mg/dl ( last download: -%)      4% time below 70mg/dl ( last download: -%)      10 %time below 54mg/dl ( last download: -%)      Findings:   1. Pattern of hypoglycemia: YES! Overnight highest risk, 14%   2. Time in target: 54 % (ADA recommended goal > 70%)   3. +  postprandial elevation after breakfast and not resuming < 200 until 9 pm   4. High glucose variability        For older and/or high-risk people with diabetes, the TIR target is lowered to >50% and TBR reduced to <1% at <70 mg/dL    1- TE from daughter. Stopped trulicity due to weight loss and decrease in appetite     Current DM Meds:     Farxiga 10mg once daily    Glipizide XL 2.5mg BID     Agree to stop PM glipizide  Continue Farxiga 10mg once daily     Recommend personal LILLY 3 CGM but insurance will not cover due to no insulin     Also due to high risk of hypoglycemia, family should have gvoke for glucose emergency     Pharmacy can review pen device   Indication would be if patient is not awake/alert enough to consume glucose to increase a low blood sugar   Subcutaneous injection device but he also has upcoming appt w [facility]     You should always have back-up glucagon in case of emergency, this is given to treat a severe low blood sugar that is not responding to eating or drinking carbohydrate,   or if you are not awake enough to safely eat or swallow. I will prescribe this if you have none at home.       Your caregiver or family member should be taught when and how to use a glucagon kit.   Severe hypoglycemia  Patient Education: Glucagon Emergency Kit      When low blood sugar isn’t treated and you need  someone to help you recover, it is considered a severe event.     Treating severe hypoglycemia  Glucagon is a hormone produced in the pancreas that stimulates your liver to release stored glucose into your bloodstream when your blood sugar levels are too low.   Glucagon is an emergency medicine that will raise your blood sugar if you are unable to eat or drink. It is an important part of being prepared if you have diabetes and take insulin or a medication that can cause hypoglycemia (low blood sugar).     Glucagon is available by prescription and is either injected or administered or puffed into the nostril.   The people you are in frequent contact with (for example, friends, family members and coworkers) should be instructed on how to give you glucagon to treat severe hypoglycemia.   If you are someone who is at risk for hypoglycemia, you should have a current (nonexpired) glucagon emergency kit on hand AND someone who knows where it is kept and how to use it.       When is the Glucagon Emergency Kit used?  Glucagon should be used to treat hypoglycemia if you are unconscious, unresponsive(meaning you cannot follow commands, even if you appear to be awake) or cannot swallow or keep any food/liquids down.    What is in the Glucagon Emergency Kit?  Your glucagon kit contains picture instructions on how to use it. Be sure to check the date on the kit periodically to make sure your kit has not .    Is glucagon dangerous?  Glucagon is a safe drug. In fact, everyone's body contains natural glucagon to help keep blood sugar level. You need a prescription to obtain glucagon, because it is an injectable emergency medication. There is no danger of overdose. However, it is for emergencies and should be used for such.     What to expect after dosing:   In about 5 to 15 minutes, the person should respond and begin malini ome out of hypoglycemia. Sometimes a person may experience nausea or may vomit after responding to  glucagon. Although initially the blood sugar may spike to a high level, it is important that the patient eat some food containing carbohydrate as soon as you are able since the injectable glucose wears off - .    What else do I need to know?  If the person does not respond within 15 minutes, call 911 immediately. It is possible that the person may be in a coma from severe hyperglycemia (very high blood sugar),rather than hypoglycemia, in which case, glucagon will not help.  Notify your diabetes provider whenever you experience a low blood sugar that requires glucagon. So you  can discuss ways to prevent severe hypoglycemia in the future.  Get your prescription refilled immediately.

## 2024-02-12 RX ORDER — GLUCAGON INJECTION, SOLUTION 1 MG/.2ML
1 INJECTION, SOLUTION SUBCUTANEOUS AS NEEDED
Qty: 0.4 ML | Refills: 1 | Status: SHIPPED | OUTPATIENT
Start: 2024-02-12

## 2024-02-12 NOTE — TELEPHONE ENCOUNTER
Orders Placed This Encounter    Interpretation code 72 hour glucose monitor    Continuous Blood Gluc Sensor (FREESTYLE LILLY 3 SENSOR) Does not apply Misc     Si Device every 14 (fourteen) days.     Dispense:  2 each     Refill:  3     NO PA; if not approved by insurance, provide cash pricing for patient    glipiZIDE ER 2.5 MG Oral Tablet 24 Hr     Si tab with breakfast daily     Dispense:  90 tablet     Refill:  0    glucagon (GVOKE HYPOPEN 2-PACK) 1 MG/0.2ML Subcutaneous injection     Sig: Inject 0.2 mL (1 mg total) into the skin as needed.     Dispense:  0.4 mL     Refill:  1

## 2024-02-12 NOTE — TELEPHONE ENCOUNTER
Call with daughter in law, reviewed recommendations to stop pm Glipizide.  They will  Silvina 3 and also explained Gvoke and Rule of 15s.  Please sign Gvoke orders, thank you!

## 2024-03-19 ENCOUNTER — TELEPHONE (OUTPATIENT)
Dept: ENDOCRINOLOGY CLINIC | Facility: CLINIC | Age: 84
End: 2024-03-19

## 2024-03-19 DIAGNOSIS — E11.65 TYPE 2 DIABETES MELLITUS WITH HYPERGLYCEMIA, WITHOUT LONG-TERM CURRENT USE OF INSULIN (HCC): Primary | ICD-10-CM

## 2024-03-19 NOTE — TELEPHONE ENCOUNTER
Pt daughter called in   Pt leaving for [facility] next week for 2 months  Requesting A1c at lab     Orders Placed This Encounter    Hemoglobin A1C     Standing Status:   Future     Standing Expiration Date:   3/19/2025    Basic Metabolic Panel (8)     Standing Status:   Future     Standing Expiration Date:   3/19/2025

## 2024-03-23 ENCOUNTER — MOBILE ENCOUNTER (OUTPATIENT)
Dept: ENDOCRINOLOGY CLINIC | Facility: CLINIC | Age: 84
End: 2024-03-23

## 2024-03-23 ENCOUNTER — LAB ENCOUNTER (OUTPATIENT)
Dept: LAB | Facility: HOSPITAL | Age: 84
End: 2024-03-23
Attending: INTERNAL MEDICINE
Payer: MEDICAID

## 2024-03-23 DIAGNOSIS — E11.69 DIABETES MELLITUS ASSOCIATED WITH HORMONAL ETIOLOGY (HCC): Primary | ICD-10-CM

## 2024-03-23 DIAGNOSIS — R79.89 HYPOURICEMIA: ICD-10-CM

## 2024-03-23 DIAGNOSIS — D64.9 ANEMIA, UNSPECIFIED: ICD-10-CM

## 2024-03-23 DIAGNOSIS — E11.9 DIABETES MELLITUS (HCC): ICD-10-CM

## 2024-03-23 DIAGNOSIS — R06.02 SHORTNESS OF BREATH: ICD-10-CM

## 2024-03-23 DIAGNOSIS — I25.10 CORONARY ATHEROSCLEROSIS OF NATIVE CORONARY ARTERY: ICD-10-CM

## 2024-03-23 DIAGNOSIS — I51.9 MYXEDEMA HEART DISEASE: ICD-10-CM

## 2024-03-23 DIAGNOSIS — E03.9 MYXEDEMA HEART DISEASE: ICD-10-CM

## 2024-03-23 DIAGNOSIS — I10 ESSENTIAL HYPERTENSION, MALIGNANT: ICD-10-CM

## 2024-03-23 DIAGNOSIS — E11.65 TYPE 2 DIABETES MELLITUS WITH HYPERGLYCEMIA, WITHOUT LONG-TERM CURRENT USE OF INSULIN (HCC): ICD-10-CM

## 2024-03-23 LAB
ALBUMIN SERPL-MCNC: 3.9 G/DL (ref 3.4–5)
ALBUMIN/GLOB SERPL: 1.1 {RATIO} (ref 1–2)
ALP LIVER SERPL-CCNC: 95 U/L
ALT SERPL-CCNC: 29 U/L
ANION GAP SERPL CALC-SCNC: 3 MMOL/L (ref 0–18)
ANION GAP SERPL CALC-SCNC: 3 MMOL/L (ref 0–18)
AST SERPL-CCNC: 23 U/L (ref 15–37)
BASOPHILS # BLD AUTO: 0 X10(3) UL (ref 0–0.2)
BASOPHILS NFR BLD AUTO: 0 %
BILIRUB SERPL-MCNC: 0.8 MG/DL (ref 0.1–2)
BUN BLD-MCNC: 31 MG/DL (ref 9–23)
BUN BLD-MCNC: 32 MG/DL (ref 9–23)
CALCIUM BLD-MCNC: 9.3 MG/DL (ref 8.5–10.1)
CALCIUM BLD-MCNC: 9.4 MG/DL (ref 8.5–10.1)
CHLORIDE SERPL-SCNC: 113 MMOL/L (ref 98–112)
CHLORIDE SERPL-SCNC: 114 MMOL/L (ref 98–112)
CHOLEST SERPL-MCNC: 149 MG/DL (ref ?–200)
CO2 SERPL-SCNC: 25 MMOL/L (ref 21–32)
CO2 SERPL-SCNC: 25 MMOL/L (ref 21–32)
CREAT BLD-MCNC: 1.53 MG/DL
CREAT BLD-MCNC: 1.53 MG/DL
EGFRCR SERPLBLD CKD-EPI 2021: 45 ML/MIN/1.73M2 (ref 60–?)
EGFRCR SERPLBLD CKD-EPI 2021: 45 ML/MIN/1.73M2 (ref 60–?)
EOSINOPHIL # BLD AUTO: 0.03 X10(3) UL (ref 0–0.7)
EOSINOPHIL NFR BLD AUTO: 0.6 %
ERYTHROCYTE [DISTWIDTH] IN BLOOD BY AUTOMATED COUNT: 19.9 %
EST. AVERAGE GLUCOSE BLD GHB EST-MCNC: 209 MG/DL (ref 68–126)
FASTING PATIENT LIPID ANSWER: YES
FASTING STATUS PATIENT QL REPORTED: YES
FASTING STATUS PATIENT QL REPORTED: YES
GLOBULIN PLAS-MCNC: 3.6 G/DL (ref 2.8–4.4)
GLUCOSE BLD-MCNC: 180 MG/DL (ref 70–99)
GLUCOSE BLD-MCNC: 183 MG/DL (ref 70–99)
HBA1C MFR BLD: 8.9 % (ref ?–5.7)
HCT VFR BLD AUTO: 42.7 %
HDLC SERPL-MCNC: 41 MG/DL (ref 40–59)
HGB BLD-MCNC: 13.6 G/DL
IMM GRANULOCYTES # BLD AUTO: 0.02 X10(3) UL (ref 0–1)
IMM GRANULOCYTES NFR BLD: 0.4 %
LDLC SERPL CALC-MCNC: 86 MG/DL (ref ?–100)
LYMPHOCYTES # BLD AUTO: 1.57 X10(3) UL (ref 1–4)
LYMPHOCYTES NFR BLD AUTO: 32 %
MCH RBC QN AUTO: 20.8 PG (ref 26–34)
MCHC RBC AUTO-ENTMCNC: 31.9 G/DL (ref 31–37)
MCV RBC AUTO: 65.2 FL
MONOCYTES # BLD AUTO: 0.46 X10(3) UL (ref 0.1–1)
MONOCYTES NFR BLD AUTO: 9.4 %
NEUTROPHILS # BLD AUTO: 2.82 X10 (3) UL (ref 1.5–7.7)
NEUTROPHILS # BLD AUTO: 2.82 X10(3) UL (ref 1.5–7.7)
NEUTROPHILS NFR BLD AUTO: 57.6 %
NONHDLC SERPL-MCNC: 108 MG/DL (ref ?–130)
OSMOLALITY SERPL CALC.SUM OF ELEC: 303 MOSM/KG (ref 275–295)
OSMOLALITY SERPL CALC.SUM OF ELEC: 305 MOSM/KG (ref 275–295)
PLATELET # BLD AUTO: 105 10(3)UL (ref 150–450)
PLATELETS.RETICULATED NFR BLD AUTO: 5.4 % (ref 0–7)
POTASSIUM SERPL-SCNC: 5.4 MMOL/L (ref 3.5–5.1)
POTASSIUM SERPL-SCNC: 5.5 MMOL/L (ref 3.5–5.1)
PROT SERPL-MCNC: 7.5 G/DL (ref 6.4–8.2)
RBC # BLD AUTO: 6.55 X10(6)UL
SODIUM SERPL-SCNC: 141 MMOL/L (ref 136–145)
SODIUM SERPL-SCNC: 142 MMOL/L (ref 136–145)
T4 SERPL-MCNC: 6 UG/DL
TRIGL SERPL-MCNC: 121 MG/DL (ref 30–149)
TSI SER-ACNC: 2.8 MIU/ML (ref 0.36–3.74)
VLDLC SERPL CALC-MCNC: 19 MG/DL (ref 0–30)
WBC # BLD AUTO: 4.9 X10(3) UL (ref 4–11)

## 2024-03-23 PROCEDURE — 84436 ASSAY OF TOTAL THYROXINE: CPT

## 2024-03-23 PROCEDURE — 80053 COMPREHEN METABOLIC PANEL: CPT

## 2024-03-23 PROCEDURE — 36415 COLL VENOUS BLD VENIPUNCTURE: CPT

## 2024-03-23 PROCEDURE — 83036 HEMOGLOBIN GLYCOSYLATED A1C: CPT

## 2024-03-23 PROCEDURE — 85025 COMPLETE CBC W/AUTO DIFF WBC: CPT

## 2024-03-23 PROCEDURE — 80061 LIPID PANEL: CPT

## 2024-03-23 PROCEDURE — 84443 ASSAY THYROID STIM HORMONE: CPT

## 2024-03-23 RX ORDER — BLOOD SUGAR DIAGNOSTIC
STRIP MISCELLANEOUS
Qty: 100 STRIP | Refills: 3 | Status: SHIPPED | OUTPATIENT
Start: 2024-03-23

## 2024-03-23 RX ORDER — BLOOD-GLUCOSE METER
1 EACH MISCELLANEOUS ONCE
Qty: 1 KIT | Refills: 0 | Status: SHIPPED | OUTPATIENT
Start: 2024-03-23 | End: 2024-03-23 | Stop reason: WASHOUT

## 2024-03-23 RX ORDER — BLOOD-GLUCOSE SENSOR
1 EACH MISCELLANEOUS
Qty: 6 EACH | Refills: 3 | Status: SHIPPED | OUTPATIENT
Start: 2024-03-23

## 2024-03-27 ENCOUNTER — HOSPITAL ENCOUNTER (OUTPATIENT)
Dept: LAB | Facility: HOSPITAL | Age: 84
Discharge: HOME | End: 2024-03-27
Attending: INTERNAL MEDICINE
Payer: MEDICAID

## 2024-03-27 LAB
ALBUMIN SERPL-MCNC: 3.9 G/DL (ref 3.4–5)
ALBUMIN/GLOB SERPL: 1.1 {RATIO} (ref 1–2)
ALP LIVER SERPL-CCNC: 104 U/L
ALT SERPL-CCNC: 36 U/L
ANION GAP SERPL CALC-SCNC: 6 MMOL/L (ref 0–18)
AST SERPL-CCNC: 29 U/L (ref 15–37)
BILIRUB SERPL-MCNC: 0.6 MG/DL (ref 0.1–2)
BUN BLD-MCNC: 31 MG/DL (ref 9–23)
CALCIUM BLD-MCNC: 9.4 MG/DL (ref 8.5–10.1)
CHLORIDE SERPL-SCNC: 110 MMOL/L (ref 98–112)
CO2 SERPL-SCNC: 22 MMOL/L (ref 21–32)
CREAT BLD-MCNC: 1.64 MG/DL
EGFRCR SERPLBLD CKD-EPI 2021: 42 ML/MIN/1.73M2 (ref 60–?)
EST. AVERAGE GLUCOSE BLD GHB EST-MCNC: 209 MG/DL (ref 68–126)
GLOBULIN PLAS-MCNC: 3.4 G/DL (ref 2.8–4.4)
GLUCOSE BLD-MCNC: 312 MG/DL (ref 70–99)
HBA1C MFR BLD: 8.9 % (ref ?–5.7)
MAGNESIUM SERPL-MCNC: 2.6 MG/DL (ref 1.6–2.6)
OSMOLALITY SERPL CALC.SUM OF ELEC: 304 MOSM/KG (ref 275–295)
POTASSIUM SERPL-SCNC: 5.3 MMOL/L (ref 3.5–5.1)
PROT SERPL-MCNC: 7.3 G/DL (ref 6.4–8.2)
PSA SERPL-MCNC: 5.76 NG/ML (ref ?–4)
SODIUM SERPL-SCNC: 138 MMOL/L (ref 136–145)
VIT D+METAB SERPL-MCNC: 33.2 NG/ML (ref 30–100)

## 2024-03-27 PROCEDURE — 82306 VITAMIN D 25 HYDROXY: CPT | Performed by: INTERNAL MEDICINE

## 2024-03-27 PROCEDURE — 84153 ASSAY OF PSA TOTAL: CPT | Performed by: INTERNAL MEDICINE

## 2024-03-27 PROCEDURE — 83036 HEMOGLOBIN GLYCOSYLATED A1C: CPT | Performed by: INTERNAL MEDICINE

## 2024-03-27 PROCEDURE — 36415 COLL VENOUS BLD VENIPUNCTURE: CPT | Performed by: INTERNAL MEDICINE

## 2024-03-27 PROCEDURE — 80053 COMPREHEN METABOLIC PANEL: CPT | Performed by: INTERNAL MEDICINE

## 2024-03-27 PROCEDURE — 83735 ASSAY OF MAGNESIUM: CPT | Performed by: INTERNAL MEDICINE

## 2024-03-28 ENCOUNTER — TELEPHONE (OUTPATIENT)
Dept: FAMILY MEDICINE CLINIC | Facility: CLINIC | Age: 84
End: 2024-03-28

## 2024-03-28 DIAGNOSIS — R39.15 URGENCY OF URINATION: Primary | ICD-10-CM

## 2024-03-28 NOTE — TELEPHONE ENCOUNTER
Pt saw Dr. Fountain on 3/26/24 and c/o urgency with urination. Dr. Fountain recommended he see Dr. Gilbert. Referral placed.

## 2024-03-28 NOTE — TELEPHONE ENCOUNTER
- Pt was told to contact pcp to get referral to see Urologist Dr.Paul Valle PH. [phone]  Pt saw Jane Fountain.    Pt will be leaving to go to [place]     Please call when order is placed.    Future Appointments   Date Time Provider Department Center   6/28/2024 10:40 AM Nav Melchor MD [facility]   8/27/2024  3:30 PM Marlin Norris APRN [facility]

## 2024-03-28 NOTE — TELEPHONE ENCOUNTER
Pt will need new referral to a urologist. Can't see a [facility] provider because PCP is not with [facility]. Pt will be leaving on April 8 and will be in [place] for 2 months.

## 2024-04-01 ENCOUNTER — TELEPHONE (OUTPATIENT)
Dept: ENDOCRINOLOGY CLINIC | Facility: CLINIC | Age: 84
End: 2024-04-01

## 2024-04-01 RX ORDER — BLOOD-GLUCOSE METER
1 EACH MISCELLANEOUS AS DIRECTED
Qty: 1 KIT | Refills: 0 | Status: SHIPPED | OUTPATIENT
Start: 2024-04-01

## 2024-04-01 NOTE — TELEPHONE ENCOUNTER
Received fax for refill on meter kit called [facility] Rx was sent 3/23/24 Pharmacy tech rx was not received re-sent Meter order

## 2024-04-05 ENCOUNTER — LAB ENCOUNTER (OUTPATIENT)
Dept: LAB | Facility: HOSPITAL | Age: 84
End: 2024-04-05
Payer: MEDICAID

## 2024-04-05 ENCOUNTER — TELEPHONE (OUTPATIENT)
Dept: SURGERY | Facility: CLINIC | Age: 84
End: 2024-04-05

## 2024-04-05 ENCOUNTER — OFFICE VISIT (OUTPATIENT)
Dept: SURGERY | Facility: CLINIC | Age: 84
End: 2024-04-05

## 2024-04-05 ENCOUNTER — OFFICE VISIT (OUTPATIENT)
Dept: NEPHROLOGY | Facility: CLINIC | Age: 84
End: 2024-04-05
Payer: MEDICAID

## 2024-04-05 VITALS — BODY MASS INDEX: 24 KG/M2 | DIASTOLIC BLOOD PRESSURE: 82 MMHG | SYSTOLIC BLOOD PRESSURE: 132 MMHG | WEIGHT: 148.25 LBS

## 2024-04-05 DIAGNOSIS — R97.20 ELEVATED PSA: ICD-10-CM

## 2024-04-05 DIAGNOSIS — N32.81 OAB (OVERACTIVE BLADDER): ICD-10-CM

## 2024-04-05 DIAGNOSIS — N18.30 STAGE 3 CHRONIC KIDNEY DISEASE, UNSPECIFIED WHETHER STAGE 3A OR 3B CKD (HCC): ICD-10-CM

## 2024-04-05 DIAGNOSIS — N18.30 STAGE 3 CHRONIC KIDNEY DISEASE, UNSPECIFIED WHETHER STAGE 3A OR 3B CKD (HCC): Primary | ICD-10-CM

## 2024-04-05 DIAGNOSIS — N13.8 BPH WITH OBSTRUCTION/LOWER URINARY TRACT SYMPTOMS: Primary | ICD-10-CM

## 2024-04-05 DIAGNOSIS — N40.1 BPH WITH OBSTRUCTION/LOWER URINARY TRACT SYMPTOMS: Primary | ICD-10-CM

## 2024-04-05 LAB
ANION GAP SERPL CALC-SCNC: 5 MMOL/L (ref 0–18)
APPEARANCE: CLEAR
BILIRUBIN: NEGATIVE
BUN BLD-MCNC: 33 MG/DL (ref 9–23)
CALCIUM BLD-MCNC: 9.7 MG/DL (ref 8.5–10.1)
CHLORIDE SERPL-SCNC: 109 MMOL/L (ref 98–112)
CO2 SERPL-SCNC: 24 MMOL/L (ref 21–32)
CREAT BLD-MCNC: 1.65 MG/DL
EGFRCR SERPLBLD CKD-EPI 2021: 41 ML/MIN/1.73M2 (ref 60–?)
FASTING STATUS PATIENT QL REPORTED: NO
GLUCOSE (URINE DIPSTICK): >=1000 MG/DL
GLUCOSE BLD-MCNC: 233 MG/DL (ref 70–99)
KETONES (URINE DIPSTICK): NEGATIVE MG/DL
LEUKOCYTES: NEGATIVE
MAGNESIUM SERPL-MCNC: 2.6 MG/DL (ref 1.6–2.6)
MULTISTIX LOT#: ABNORMAL NUMERIC
NITRITE, URINE: NEGATIVE
OCCULT BLOOD: NEGATIVE
OSMOLALITY SERPL CALC.SUM OF ELEC: 301 MOSM/KG (ref 275–295)
PH, URINE: 5.5 (ref 4.5–8)
POTASSIUM SERPL-SCNC: 5.2 MMOL/L (ref 3.5–5.1)
PROTEIN (URINE DIPSTICK): NEGATIVE MG/DL
SODIUM SERPL-SCNC: 138 MMOL/L (ref 136–145)
SPECIFIC GRAVITY: 1.02 (ref 1–1.03)
URINE-COLOR: YELLOW
UROBILINOGEN,SEMI-QN: 0.2 MG/DL (ref 0–1.9)

## 2024-04-05 PROCEDURE — 99204 OFFICE O/P NEW MOD 45 MIN: CPT

## 2024-04-05 PROCEDURE — 36415 COLL VENOUS BLD VENIPUNCTURE: CPT

## 2024-04-05 PROCEDURE — 80048 BASIC METABOLIC PNL TOTAL CA: CPT

## 2024-04-05 PROCEDURE — 81003 URINALYSIS AUTO W/O SCOPE: CPT

## 2024-04-05 PROCEDURE — 83735 ASSAY OF MAGNESIUM: CPT

## 2024-04-05 RX ORDER — SODIUM BICARBONATE 650 MG/1
650 TABLET ORAL 2 TIMES DAILY
Qty: 180 TABLET | Refills: 2 | Status: SHIPPED | OUTPATIENT
Start: 2024-04-05

## 2024-04-05 RX ORDER — FINASTERIDE 5 MG/1
5 TABLET, FILM COATED ORAL DAILY
Qty: 90 TABLET | Refills: 3 | Status: SHIPPED | OUTPATIENT
Start: 2024-04-05

## 2024-04-05 RX ORDER — SILODOSIN 8 MG/1
8 CAPSULE ORAL DAILY
Qty: 90 CAPSULE | Refills: 3 | Status: SHIPPED | OUTPATIENT
Start: 2024-04-05

## 2024-04-05 RX ORDER — TAMSULOSIN HYDROCHLORIDE 0.4 MG/1
0.4 CAPSULE ORAL EVERY EVENING
Qty: 90 CAPSULE | Refills: 3 | Status: SHIPPED | OUTPATIENT
Start: 2024-04-05 | End: 2024-04-05

## 2024-04-05 NOTE — PROGRESS NOTES
[facility], [facility]    Urology Consult Note    History of Present Illness:   Patient is a(n) 83 year old male with hx of afib, CAD, HTN, HLD, CVA, DM (A1C 8.9 on 3/27/24) who presents to establish care with urology.    Pt c/o increasing frequency, urgency, and having to push to void in the past several mos.    AUA score: I: 0/5 F: 4/5 I: 4/5 U: 0/5 W: 3/5 S: 5/5 N: 3/5 total: 19/35, mixed feelings. Most bothersome sx is having to push to empty. UA today >1000 glucose, otherwise neg. PVR 189mL.     Also, cardiologist ordered PSA as part of routine testing and pt's level found to be elevated at 5.76.      Prior PSA  8/31/18 1.75  6/17/16 2.09  5/2/15 2.01    No fhx of prostate cx or  cx.   HISTORY:  Past Medical History:   Diagnosis Date    Arrhythmia     AFIB    ASHD (arteriosclerotic heart disease)     Atherosclerosis of coronary artery     Carotid artery stenosis     Cataract     Coronary atherosclerosis     Heart attack (HCC)     High blood pressure     High cholesterol     History of 2019 novel coronavirus disease (COVID-19) 12/26/2021    cough, sore throat, headache, fever, body aches; no hospitalization, no lingering symptoms    Stroke (HCC)     no residual deficits    Type II or unspecified type diabetes mellitus without mention of complication, not stated as uncontrolled     Visual impairment     glasses. rupture vessel of R eye      Past Surgical History:   Procedure Laterality Date    ANGIOGRAM      ANGIOPLASTY (CORONARY)      CATARACT      CATH DRUG ELUTING STENT      CATH PERCUTANEOUS  TRANSLUMINAL CORONARY ANGIOPLASTY      COLONOSCOPY N/A 1/9/2020    Procedure: COLONOSCOPY;  Surgeon: Arpan Solorzano MD;  Location:  ENDOSCOPY    NEEDLE BIOPSY LIVER  03/2022= Cirrhosis    Repeat MRI Abd 3/23      Family History   Problem Relation Age of Onset    Hypertension Mother     Cancer Brother       Social History:   Social History     Socioeconomic History    Marital status:     Tobacco Use    Smoking status: Never    Smokeless tobacco: Never   Vaping Use    Vaping Use: Never used   Substance and Sexual Activity    Alcohol use: No    Drug use: No        Allergies  Allergies   Allergen Reactions    Trulicity [Dulaglutide] DIARRHEA     Weight loss/weakness        Review of Systems:   A 10-point review of systems was completed and is negative other than as noted above.    Physical Exam:   There were no vitals taken for this visit.    GENERAL APPEARANCE: no acute distress  NEUROLOGIC: converses appropriately  HEAD: atraumatic, normocephalic  LUNGS: non-labored breathing  ABDOMEN: soft, nontender, non-distended  BACK: no CVA tenderness  PSYCH: appropriate affect and mood  SHMUEL: approx 50g, smooth, symmetric, without nodule or induration      Results:     Laboratory Data:  Lab Results   Component Value Date    WBC 4.9 03/23/2024    HGB 13.6 03/23/2024    .0 (L) 03/23/2024     Lab Results   Component Value Date     03/27/2024    K 5.3 (H) 03/27/2024     03/27/2024    CO2 22.0 03/27/2024    BUN 31 (H) 03/27/2024     (H) 03/27/2024    GFRAA 46 (L) 06/11/2022    AST 29 03/27/2024    ALT 36 03/27/2024    TP 7.3 03/27/2024    ALB 3.9 03/27/2024    PHOS 3.3 01/06/2024    CA 9.4 03/27/2024    MG 2.6 03/27/2024       Urinalysis Results (last 3 years):  Recent Labs     03/15/22  1810 04/16/22  1301 01/06/24  1455 04/05/24  1132   COLORUR Yellow Straw Yellow  --    CLARITY Clear Clear Clear  --    SPECGRAVITY 1.005 1.006 1.026 1.020   PHURINE 5.0 6.0 5.0 5.5   PROUR Negative Negative Negative  --    GLUUR Negative Negative >1000*  --    KETUR Negative Negative Negative  --    BILUR Negative Negative Negative  --    BLOODURINE Negative Negative Negative  --    NITRITE Negative Negative Negative Negative   UROBILINOGEN <2.0 <2.0 Normal  --    LEUUR Negative Negative Negative  --    WBCUR None Seen 1-5  --   --    RBCUR 0-2 0-2  --   --    BACUR Rare* None Seen  --   --         Urine Culture Results (last 3 years):  Lab Results   Component Value Date    URINECUL No Growth at 18-24 hrs. 08/30/2018       Imaging  No results found.      Impression:   Recommendations:  BPH/LUTS  - discussed starting flomax but daughter concerned pt has hx of lightheaded/dizziness so will start with rapaflo instead  - will add on finasteride given phys exam findings  - [place] is [place] out of [place] 2[place] but needs to f/u when he returns for PVR check   - if sx uncontrolled with above then may start him on myrbetriq for oab sx    OAB  - discussed dietery modifications such as drinking at least 40-60 oz water per day or enough to keep urine clear and to avoid dietary irritants such as coffee, tea, carbonated drinks, soda, juice, spicy, and citrus  - discussed getting his A1c under control will also help with frequency and he is currently working with endo     Elevated PSA  - discussed we normally stop checking PSA at 75 but given evidence of elevation, will do work up and start with 4k. If elevated, may need prostate MRI vs. Bx    Thank you very much for this consult. Please call if there are any questions or concerns.     Pema Teague PA-C  Urology  [facility]  Phone: [phone]    Date: 4/5/2024  Time: 11:58 AM

## 2024-04-05 NOTE — PROGRESS NOTES
Nephrology Consult Note      REASON FOR CONSULT:  CKD         HPI:   Oneal Luna is a 83 year old male with   Chief Complaint   Patient presents with    Chronic Kidney Disease     Ref'd back by Dr. Fountain     Hypertension     Jose Messina MD    84 y/o male with hx of longstanding DM/HTN, CAD, CKD- stg 3 (chart reviewed w/ Cr ~ 1.2 since 2015), hx of liver dysfunction - DILI- s/p steroids x 6 mos presents for initial evaluation. He is here w/ his son. Care reviewed w/ grand daughter via phone as well.  Patient is in his usual state of health.  He denies any sig nsaid use  No hx of kidney stones  No sig family hx of kidney problems  No etoh/tobacco/illicit drug use    Recent renal imaging reviewed- no sig anatomic issues  Prior UA has been fairly bland; microalbuminuria    A1C still 8.9  Has had problem w/ labile BG and hyperkalemia    He has had problem w/ hyperkalemia and and hence not on any acei/arb       ROS:    See above; 12 systems reviewed and otherwise unremarkable      PMH:  Past Medical History:   Diagnosis Date    Arrhythmia     AFIB    ASHD (arteriosclerotic heart disease)     Atherosclerosis of coronary artery     Carotid artery stenosis     Cataract     Coronary atherosclerosis     Heart attack (HCC)     High blood pressure     High cholesterol     History of 2019 novel coronavirus disease (COVID-19) 12/26/2021    cough, sore throat, headache, fever, body aches; no hospitalization, no lingering symptoms    Stroke (HCC)     no residual deficits    Type II or unspecified type diabetes mellitus without mention of complication, not stated as uncontrolled     Visual impairment     glasses. rupture vessel of R eye         PSH:  Past Surgical History:   Procedure Laterality Date    ANGIOGRAM      ANGIOPLASTY (CORONARY)      CATARACT      CATH DRUG ELUTING STENT      CATH PERCUTANEOUS  TRANSLUMINAL CORONARY ANGIOPLASTY      COLONOSCOPY N/A 1/9/2020    Procedure: COLONOSCOPY;  Surgeon: Arpan Solorzano MD;   Location:  ENDOSCOPY    NEEDLE BIOPSY LIVER  03/2022= Cirrhosis    Repeat MRI Abd 3/23         Medications (Active prior to today's visit):  Current Outpatient Medications   Medication Sig Dispense Refill    sodium bicarbonate 650 MG Oral Tab Take 1 tablet (650 mg total) by mouth 2 (two) times daily. 180 tablet 2    Blood Glucose Monitoring Suppl (ONETOUCH VERIO) w/Device Does not apply Kit 1 each As Directed. 1 kit 0    Glucose Blood (ONETOUCH VERIO) In Vitro Strip Test blood sugar once daily, E11.65, no current insulin use 100 strip 3    Continuous Blood Gluc Sensor (FREESTYLE LILLY 3 SENSOR) Does not apply Misc 1 Device every 14 (fourteen) days. 6 each 3    glucagon (GVOKE HYPOPEN 2-PACK) 1 MG/0.2ML Subcutaneous injection Inject 0.2 mL (1 mg total) into the skin as needed. 0.4 mL 1    glipiZIDE ER 2.5 MG Oral Tablet 24 Hr 1 tab with breakfast daily 90 tablet 0    dapagliflozin (FARXIGA) 10 MG Oral Tab Take 1 tablet (10 mg total) by mouth daily. 90 tablet 0    gabapentin 600 MG Oral Tab Take 1 tablet (600 mg total) by mouth 2 (two) times daily. (Patient taking differently: Take 0.5 tablets (300 mg total) by mouth 2 (two) times daily.) 60 tablet 1    Multiple Vitamins-Minerals (MULTIVITAMIN ADULTS OR) Take 1 tablet by mouth daily. (Patient not taking: Reported on 12/1/2023)      hydrALAZINE 100 MG Oral Tab Take 1 tablet (100 mg total) by mouth. 100mg in the AM and 50mg in the PM  0    polymyxin B-trimethoprim 50356-8.1 UNIT/ML-% Ophthalmic Solution 1 DROP IN AFFECTED EYE THREE TIMES A DAY FOR UPTO 7 DAYS (Patient not taking: Reported on 12/1/2023) 10 mL 0    OneTouch Delica Lancets 33G Does not apply Misc 1 each by Does not apply route 4 (four) times daily. 400 each 0    acetaminophen 325 MG Oral Tab Take 1 tablet (325 mg total) by mouth every 6 (six) hours as needed for Pain.      Clopidogrel Bisulfate 75 MG Oral Tab Take 1 tablet (75 mg total) by mouth daily.      rivaroxaban 15 MG Oral Tab Take 1 tablet (15 mg  total) by mouth daily with food.           Allergies:  Allergies   Allergen Reactions    Trulicity [Dulaglutide] DIARRHEA     Weight loss/weakness        Social History:  Social History     Socioeconomic History    Marital status:    Tobacco Use    Smoking status: Never    Smokeless tobacco: Never   Vaping Use    Vaping Use: Never used   Substance and Sexual Activity    Alcohol use: No    Drug use: No          Family History:  Family History   Problem Relation Age of Onset    Hypertension Mother     Cancer Brother             PHYSICAL EXAM:   /82 (BP Location: Left arm, Patient Position: Sitting)   Wt 148 lb 4 oz (67.2 kg)   BMI 23.93 kg/m²    Wt Readings from Last 6 Encounters:   04/05/24 148 lb 4 oz (67.2 kg)   12/01/23 149 lb 8 oz (67.8 kg)   10/30/23 151 lb 12.8 oz (68.9 kg)   10/27/23 154 lb (69.9 kg)   09/06/22 156 lb (70.8 kg)   05/06/22 162 lb 2 oz (73.5 kg)     General: Alert and oriented in no apparent distress.  HEENT: No scleral icterus, MMM  Neck: Supple, no LISA or thyromegaly  Cardiac: Regular rate and rhythm, S1, S2 normal, no murmur or rub  Lungs: Clear without wheezes, rales, rhonchi.    Abdomen: Soft, non-tender. + bowel sounds, no palpable organomegaly  Extremities: Without clubbing, cyanosis or edema.  Neurologic:  normal affect, cranial nerves grossly intact, moving all extremities  Skin: Warm and dry, no rashes    Renal US 2022: normal    Component      Latest Ref Rng 1/6/2024 3/23/2024 3/27/2024   WBC      4.0 - 11.0 x10(3) uL  4.9     RBC      3.80 - 5.80 x10(6)uL  6.55 (H)     Hemoglobin      13.0 - 17.5 g/dL  13.6     Hematocrit      39.0 - 53.0 %  42.7     Platelet Count      150.0 - 450.0 10(3)uL  105.0 (L)     Immature Platelet Fraction      0.0 - 7.0 %  5.4     MCV      80.0 - 100.0 fL  65.2 (L)     MCH      26.0 - 34.0 pg  20.8 (L)     MCHC      31.0 - 37.0 g/dL  31.9     RDW      %  19.9     Prelim Neutrophil Abs      1.50 - 7.70 x10 (3) uL  2.82     Neutrophils  Absolute      1.50 - 7.70 x10(3) uL  2.82     Lymphocytes Absolute      1.00 - 4.00 x10(3) uL  1.57     Monocytes Absolute      0.10 - 1.00 x10(3) uL  0.46     Eosinophils Absolute      0.00 - 0.70 x10(3) uL  0.03     Basophils Absolute      0.00 - 0.20 x10(3) uL  0.00     Immature Granulocyte Absolute      0.00 - 1.00 x10(3) uL  0.02     Neutrophils %      %  57.6     Lymphocytes %      %  32.0     Monocytes %      %  9.4     Eosinophils %      %  0.6     Basophils %      %  0.0     Immature Granulocyte %      %  0.4     Glucose      70 - 99 mg/dL  180 (H)  312 (H)    Glucose        183 (H)     Sodium      136 - 145 mmol/L  141  138    Sodium        142     Potassium      3.5 - 5.1 mmol/L  5.5 (H)  5.3 (H)    Potassium        5.4 (H)     Chloride      98 - 112 mmol/L  113 (H)  110    Chloride        114 (H)     Carbon Dioxide, Total      21.0 - 32.0 mmol/L  25.0  22.0    Carbon Dioxide, Total        25.0     ANION GAP      0 - 18 mmol/L  3  6    ANION GAP        3     BUN      9 - 23 mg/dL  32 (H)  31 (H)    BUN        31 (H)     CREATININE      0.70 - 1.30 mg/dL  1.53 (H)  1.64 (H)    CREATININE        1.53 (H)     CALCIUM      8.5 - 10.1 mg/dL  9.4  9.4    CALCIUM        9.3     CALCULATED OSMOLALITY      275 - 295 mOsm/kg  303 (H)  304 (H)    CALCULATED OSMOLALITY        305 (H)     EGFR      >=60 mL/min/1.73m2  45 (L)  42 (L)    EGFR        45 (L)     AST (SGOT)      15 - 37 U/L  23  29    ALT (SGPT)      16 - 61 U/L  29  36    ALKALINE PHOSPHATASE      45 - 117 U/L  95  104    Total Bilirubin      0.1 - 2.0 mg/dL  0.8  0.6    PROTEIN, TOTAL      6.4 - 8.2 g/dL  7.5  7.3    Albumin      3.4 - 5.0 g/dL  3.9  3.9    Globulin      2.8 - 4.4 g/dL  3.6  3.4    A/G Ratio      1.0 - 2.0   1.1  1.1    Patient Fasting for CMP?  Yes  Patient not present    Patient Fasting for BMP?  Yes     Cholesterol, Total      <200 mg/dL  149     HDL Cholesterol      40 - 59 mg/dL  41     Triglycerides      30 - 149 mg/dL  121     LDL  Cholesterol Calc      <100 mg/dL  86     VLDL      0 - 30 mg/dL  19     NON-HDL CHOLESTEROL      <130 mg/dL  108     Patient Fasting for Lipid?  Yes     HEMOGLOBIN A1c      <5.7 %  8.9 (H)  8.9 (H)    ESTIMATED AVERAGE GLUCOSE      68 - 126 mg/dL  209 (H)  209 (H)    PHOSPHORUS      2.5 - 4.9 mg/dL 3.3      Magnesium, Serum      1.6 - 2.6 mg/dL 2.8 (H)   2.6    PTH INTACT      18.5 - 88.0 pg/mL 102.9 (H)      PSA      <=4.00 ng/mL   5.76 (H)    VITAMIN D, 25-OH, TOTAL      30.0 - 100.0 ng/mL   33.2       Legend:  (H) High  (L) Low     ASSESSMENT/PLAN:   Oneal was seen today for chronic kidney disease and hypertension.    Diagnoses and all orders for this visit:    Stage 3 chronic kidney disease, unspecified whether stage 3a or 3b CKD (HCC)  -     Basic Metabolic Panel (8); Future  -     Magnesium; Future    Other orders  -     sodium bicarbonate 650 MG Oral Tab; Take 1 tablet (650 mg total) by mouth 2 (two) times daily.        CKD - Cr gradually increased over past decade. Suspect DKD given concurrent hx of longstanding DM; + microalbuminuria- otherwise, urine is fairly bland. 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   - will consider low dose diuretic vs. K - binders  CAD; follows w/ cards  Hx of remote stroke?; has had carotid imaging-medical management   HTN- stable; reviewed goals w/ family; low salt diet  - cpm  Hx of DILI- s/p steroids; follows w/ Dr. Zuñiga    Plan to f/u in 3 mos       Nav Melchor MD  4/5/2024

## 2024-04-05 NOTE — TELEPHONE ENCOUNTER
Patient daughter calling in regards to test results. Patient daughter would like results said to her and explained as the patient is to be leaving the country come Monday 04.08.24 for two months. Based on results and per exam will determine a decision that will be made for patient on whether travel will be okay for him or not. Especially considering his age. Please advise.

## 2024-04-08 ENCOUNTER — TELEPHONE (OUTPATIENT)
Dept: SURGERY | Facility: CLINIC | Age: 84
End: 2024-04-08

## 2024-04-08 NOTE — TELEPHONE ENCOUNTER
Left vm. Ok to take both meds. Tamsulosin for flow and finasteride to shrink prostate given very large prostate physical exam findings after I got off the phone with daughter we decided to add this on. I explained this to the son at the visit as well.     Will get in touch when I get 4k results back but this should not delay his travel

## 2024-04-08 NOTE — TELEPHONE ENCOUNTER
Patient daughter called regarding two medications that were given to patient Tamsulosin also Finasteride 5mg would like to confirm both medications should be taken because patient is going out of country And does not want patient to have dizziness and is worried patient could fall.

## 2024-04-10 ENCOUNTER — TELEPHONE (OUTPATIENT)
Dept: SURGERY | Facility: CLINIC | Age: 84
End: 2024-04-10

## 2024-04-11 ENCOUNTER — TELEPHONE (OUTPATIENT)
Dept: ENDOCRINOLOGY CLINIC | Facility: CLINIC | Age: 84
End: 2024-04-11

## 2024-04-11 ENCOUNTER — TELEPHONE (OUTPATIENT)
Dept: SURGERY | Facility: CLINIC | Age: 84
End: 2024-04-11

## 2024-04-11 ENCOUNTER — TELEPHONE (OUTPATIENT)
Dept: FAMILY MEDICINE CLINIC | Facility: CLINIC | Age: 84
End: 2024-04-11

## 2024-04-11 RX ORDER — SILODOSIN 8 MG/1
8 CAPSULE ORAL DAILY
Qty: 90 CAPSULE | Refills: 3 | Status: SHIPPED | OUTPATIENT
Start: 2024-04-11

## 2024-04-11 NOTE — TELEPHONE ENCOUNTER
4K score of 45.6.  Repeat PSA on this test was 5.37.  Free PSA was 19%.  I recommended that they get the biopsy done considering that its high probability for prostate cancer.  Discussed extensively again what this test meant and why we did it.  Daughter-in-law will talk with patient and family and decide if they want to go through with the biopsy.    Also clarify the medications that he was prescribed again.  Family had high concern for side effects of dizziness and lightheadedness on Flomax so I sent silodosin at the last office visit and they were to figure out costs.  However it seems that they picked up tamsulosin at the pharmacy.  I am unsure how Flomax got sent.  There is no documentation regarding this.  I have sent silodosin again and clarified what finasteride is used for.

## 2024-04-11 NOTE — TELEPHONE ENCOUNTER
RN called patient's daughter. No answer. Unable to leave message.     Silodosin sent again today, 4/11 by PA  Tamsulosin sent on 4/5 was discontinued d/t concern of dizziness/lightheadness.

## 2024-04-11 NOTE — TELEPHONE ENCOUNTER
Pt's daughter in law called in, stating he recently saw urology for difficulty urinating.  They ran a PSA which was suspicious for prostate cancer.  Pt is currently in [place] until June 15th.  Daughter in law had questions for Marlin about what direction they should go as far as biopsy and treatment and wanted Marlin to know.  MANOLO wasn't sure if patient would even want to do biopsy.  Informed daughter that Marlin would defer to urology and /or oncology for the plan if dx with cancer, but could help manage blood sugars if affected by medications or chemo/steroids to treat this condition.  MANOLO states Marlin has been so good with her father in law for diabetes management and is thankful for her help.

## 2024-04-11 NOTE — TELEPHONE ENCOUNTER
Pt had PSA test done and 4K blood test. Daughter was told that it may be prostate cancer based on results. Biopsy is recommended. Pt is in [place] until 6-15-24 but daughter would like to just talk with Dr. Messina and get his thoughts on this first. She would greatly appreciate phone call.

## 2024-04-11 NOTE — TELEPHONE ENCOUNTER
Second call. RN called patient's daughter. No answer. Unable to leave message. Keeps on ringing and eventually a dial tone.      Silodosin sent again today, 4/11 by PA  Tamsulosin sent on 4/5 was discontinued d/t concern of dizziness/lightheadness.   Prostate biopsy recommended d/t elevated PSA  Can place ABT for prostate bx

## 2024-04-11 NOTE — TELEPHONE ENCOUNTER
Per daughter spoke to Pema this morning and has follow up questions. Please see signed encounter from 4/10. Please advise

## 2024-04-15 ENCOUNTER — TELEPHONE (OUTPATIENT)
Dept: SURGERY | Facility: CLINIC | Age: 84
End: 2024-04-15

## 2024-04-15 NOTE — TELEPHONE ENCOUNTER
Patient daughter in law requesting a call back has medical questions   Please advise per pt she hasn't received a call back she would like to speak to SANTA Mcadams not a nurse.

## 2024-04-16 RX ORDER — CEFDINIR 300 MG/1
300 CAPSULE ORAL EVERY 12 HOURS
Qty: 6 CAPSULE | Refills: 0 | Status: SHIPPED | OUTPATIENT
Start: 2024-04-16 | End: 2024-04-19

## 2024-04-16 RX ORDER — CIPROFLOXACIN 500 MG/1
500 TABLET, FILM COATED ORAL 2 TIMES DAILY
Qty: 6 TABLET | Refills: 0 | Status: SHIPPED | OUTPATIENT
Start: 2024-04-16 | End: 2024-04-19

## 2024-04-18 ENCOUNTER — TELEPHONE (OUTPATIENT)
Dept: ENDOCRINOLOGY CLINIC | Facility: CLINIC | Age: 84
End: 2024-04-18

## 2024-04-18 NOTE — TELEPHONE ENCOUNTER
Called daughter back at [phone]. Agreeable to plan to start rybelsus, explained how/when to take as well as side effects and how to help nausea. Daughter is going to see if there is a pharmacy in [place] that she can get this from for him. She will let us know if she needs us to send it anywhere.

## 2024-04-18 NOTE — TELEPHONE ENCOUNTER
Would be reluctant to increase glipizide dose given some mid 100 range fasting     HX drug induced liver injury so avoid TZD   Would recommend revisiting GLP   Oral GLP semaglutide , Rybelsus may not have as much impact on appetite,weight loss as trulicity did     Not sure they could get access to Rybelsus in [place]  If agreeable I can send rx in     Rybelsus:   Belongs to a class of drugs called: GLP-1 receptor agonists. This gut hormones works in the following ways:   By helping beta cells release more inslin when there is food in the stomach and intestines. The increased insulin lowers blood sugars  By stopping the liver from releasing sugar in to the blood when it is NOT needed  By slowing the movement of food throug the stomach so that  sugar enters the blood more slowly   By making you feel full which helps decrease the amount of food that you eat.     Start Rybelsus 3mg once daily for the first month. If you are tolerating the medicine, we will increase to Rybelsus 7mg once daily (since this is the recommended dose)       Dosing instructions: it is very important to follow the dosing rules otherwise the medication will not be as effective.     Rybelsus 3 mg once daily 30 days     Please swallow the pill whole (do not crush or chew the tablet)     It must be taken on empty stomach- you will have to delay eating or drinking liquids (even water ) for 30 minutes -   The pill must be only taken with 4 ounces of plain WATER     Please do not take with any other medicines~ and also taking the medication with food or more water will make the medication LESS effective      There is a Rybelsus discount card you can use that will make the cost $10 per month.       Rybelsus Common side effects:   Nausea or diarrhea   These effects usually go away over time as your body gets used to the medicine     Here are some things that might help your nausea go away:     Eat small amounts of food instrad of few large meals  Eat  plain, bland non greasy foods   Drink plenty of fluids (sugar free)   Avoid foods and smells that might make you sick   Eat slowly and listed to your hunger

## 2024-04-18 NOTE — TELEPHONE ENCOUNTER
Pt's daughter had called in, pt is currently out of the country but pt's daughter wanted to check in w CB about pt's number recently and confirm his medications.    Per TE 1/26/24 - trulicity was stopped due to side effects.  After luis pro DL 2/09/24 - pt was having lows, glipizide was stopped and pt was taking farxiga.    Ramsesther states pt has been doing glipizide 2.5mg twice daily and farxiga 10mg once daily, no trulicity.    She says his numbers have been higher:  226, 207, 170, 226, 167, 156, 199, 203 from his meter, AM fasting readings.    No changes to diet, he has been walking most days.    Daughter isn't sure if med adjustments need to be made due to these higher numbers.    Call back #[phone]

## 2024-04-25 ENCOUNTER — TELEPHONE (OUTPATIENT)
Dept: FAMILY MEDICINE CLINIC | Facility: CLINIC | Age: 84
End: 2024-04-25

## 2024-05-06 ENCOUNTER — TELEPHONE (OUTPATIENT)
Dept: ENDOCRINOLOGY CLINIC | Facility: CLINIC | Age: 84
End: 2024-05-06

## 2024-05-06 NOTE — TELEPHONE ENCOUNTER
Pt's daughter in law called, pt is in [place] and they have started him on Daparyl VM (dapagliflozin, metformin and vildagliptin).  Metformin was previously stopped due to contraindication.  Daughter in law may contact family / doctors in [place] regarding  this issue.

## 2024-05-08 ENCOUNTER — TELEPHONE (OUTPATIENT)
Dept: SURGERY | Facility: CLINIC | Age: 84
End: 2024-05-08

## 2024-05-08 NOTE — TELEPHONE ENCOUNTER
RN received a denial letter from [facility] for Silodosin. Plans requires to at least try 2 preferred medications and tried it for at least 2 weeks: Alfuzosin ER tab, doxazosin tab, Prazosin caps, terazosin capsules

## 2024-05-09 RX ORDER — ALFUZOSIN HYDROCHLORIDE 10 MG/1
10 TABLET, EXTENDED RELEASE ORAL DAILY
Qty: 90 TABLET | Refills: 3 | Status: SHIPPED | OUTPATIENT
Start: 2024-05-09

## 2024-06-18 RX ORDER — DAPAGLIFLOZIN 10 MG/1
10 TABLET, FILM COATED ORAL DAILY
Qty: 90 TABLET | Refills: 0 | Status: SHIPPED | OUTPATIENT
Start: 2024-06-18

## 2024-06-18 RX ORDER — GLIPIZIDE 2.5 MG/1
TABLET, EXTENDED RELEASE ORAL
Qty: 180 TABLET | Refills: 0 | Status: SHIPPED | OUTPATIENT
Start: 2024-06-18

## 2024-06-18 NOTE — TELEPHONE ENCOUNTER
Fax received from [facility] , needs PRIOR AUTHORIZATION.    Called pharmacy and had ran for brand name.     Brand name covered no charge.

## 2024-06-18 NOTE — TELEPHONE ENCOUNTER
Requested Prescriptions     Pending Prescriptions Disp Refills    DAPAGLIFLOZIN 10 MG Oral Tab [Pharmacy Med Name: Dapagliflozin Propanediol 10 MG Oral Tablet] 90 tablet 0     Sig: Take 1 tablet by mouth once daily    glipiZIDE ER 2.5 MG Oral Tablet 24 Hr [Pharmacy Med Name: glipiZIDE ER 2.5 MG Oral Tablet Extended Release 24 Hour] 180 tablet 0     Sig: Take 1 tablet by mouth twice daily     Future Appointments   Date Time Provider Department Center   6/28/2024  9:00 AM Nelson Munson MD [id number] [facility]   6/28/2024 10:40 AM Nav Melchor MD [facility] [place]   7/1/2024  2:00 PM Nleson Munson MD [id number] [facility]   7/26/2024 11:20 AM Nav Melchor MD [facility] [place]   8/27/2024  3:30 PM Marlin Norris APRN [facility]     Last A1c value was 8.9% done 3/27/2024.  Refill 1/22/24  LOV 1/11/24

## 2024-06-19 ENCOUNTER — TELEPHONE (OUTPATIENT)
Dept: SURGERY | Facility: CLINIC | Age: 84
End: 2024-06-19

## 2024-06-19 RX ORDER — CEFDINIR 300 MG/1
300 CAPSULE ORAL EVERY 12 HOURS
Qty: 6 CAPSULE | Refills: 0 | Status: SHIPPED | OUTPATIENT
Start: 2024-06-19 | End: 2024-06-22

## 2024-06-19 RX ORDER — CIPROFLOXACIN 500 MG/1
500 TABLET, FILM COATED ORAL 2 TIMES DAILY
Qty: 6 TABLET | Refills: 0 | Status: SHIPPED | OUTPATIENT
Start: 2024-06-19

## 2024-06-19 NOTE — TELEPHONE ENCOUNTER
Per daughter in law medications sent to pharmacy for biopsy were never picked up, asking to send again and needs to know names of medications. Also has questions regarding procedure. Please call thank you.

## 2024-06-19 NOTE — TELEPHONE ENCOUNTER
This encounter is now closed.     RN called patient. Note, RN sent antibiotic orders to pharmacy today and instructions sent via [employer].     Patient unavailable. Spoke to Lizbet. RN explained that she is not listed as part of the POA. Unable to release information. She said, she read the information at [employer].     She inquired about Silodosin which was denied by insurance, but they were able to get it at [facility]. She was asking for the alternative. RN again explained that I am unable to give further explanation, but referred her to May 2024 [employer] messages and encouraged her to add her name of his list of contact/POA on his next visit. She agreed to plans.

## 2024-06-20 ENCOUNTER — TELEPHONE (OUTPATIENT)
Dept: ENDOCRINOLOGY CLINIC | Facility: CLINIC | Age: 84
End: 2024-06-20

## 2024-06-20 DIAGNOSIS — E11.65 TYPE 2 DIABETES MELLITUS WITH HYPERGLYCEMIA, WITHOUT LONG-TERM CURRENT USE OF INSULIN (HCC): Primary | ICD-10-CM

## 2024-06-20 NOTE — TELEPHONE ENCOUNTER
Noted   Orders Placed This Encounter    Hemoglobin A1C     Standing Status:   Future     Standing Expiration Date:   6/20/2025     Order Specific Question:   Release to patient     Answer:   Immediate    Comp Metabolic Panel (14)     Standing Status:   Future     Standing Expiration Date:   6/20/2025

## 2024-06-20 NOTE — TELEPHONE ENCOUNTER
Pt daughter in law called in asking for when pt got last A1C and any other labs done did inform they can see through [facility] as well if she has access. Last A1c value was 8.9% done 3/27/2024. And CMP was done 3/27/24     Pt daughter in law wants an A1C to be sent in for pt to get done for this weekend [place] states BG is stable but wants to see an updated A1C. Also wanted to know if can be placed on list for when CB has a next soonest appt pt daughter in law things waiting till August is too far out.   Future Appointments   Date Time Provider Department Center   6/28/2024  9:00 AM Nelson Munson MD [id number] [facility]   6/28/2024 10:40 AM Nav Melchor MD [facility] [place]   7/1/2024  2:00 PM Nelson Munson MD [id number] [facility]   7/26/2024 11:20 AM Nav Melchor MD [facility] [place]   8/27/2024  3:30 PM Marlin Norris APRN [facility]

## 2024-06-25 ENCOUNTER — TELEPHONE (OUTPATIENT)
Dept: SURGERY | Facility: CLINIC | Age: 84
End: 2024-06-25

## 2024-06-25 NOTE — TELEPHONE ENCOUNTER
Spoke with daughter. Asking if it's ok that he's off his Xarelto for 4 days before prostate bx.  States he's not really taking his ASA any more.  Reviewed all pre op and post op instructions. Verbalized understanding.  Please advise.

## 2024-06-25 NOTE — TELEPHONE ENCOUNTER
Patient's daughter calling for patient. Patient has schedule biopsy on 6/28. In directions for procedure, patient should stop taking blood thinners for 7 days. Patient's daughter informed patient and patient is still on medication but will stop today. Daughter would like to know if procedure should be rescheduled since patient will only be off for 4 days. Patient was giving verbal ed to be off medication from cardio. Fax will be sent to office for provider to have.

## 2024-06-26 NOTE — TELEPHONE ENCOUNTER
Spoke with daughter. States patient is complaining of increased weakness. Denies any other sx, no fever, pain dysuria. Informed her that after talking to Dr Munson we are going to cancel the biopsy at this time. A phone visit has been scheduled to discuss next step.

## 2024-06-26 NOTE — TELEPHONE ENCOUNTER
Patient's daughter is calling to report that patient is feeling very weak and sick and they are asking if the prostate biopsy on 6/28 should be rescheduled. Please call.

## 2024-06-28 ENCOUNTER — TELEPHONE (OUTPATIENT)
Dept: SURGERY | Facility: CLINIC | Age: 84
End: 2024-06-28

## 2024-06-28 DIAGNOSIS — R97.20 ELEVATED PSA: Primary | ICD-10-CM

## 2024-06-28 NOTE — TELEPHONE ENCOUNTER
Initially scheduled for telephone visit- patient not available so discussed the below with his daughter;       Patient is a(n) 84 year old male with hx of afib (xarelto), CAD, HTN, HLD, CVA, DM (A1C 8.9 on 3/27/24) who presents for follow up for LUTS, elev PSA.     He saw STAN in April 224.   Pt c/o increasing frequency, urgency, and having to push to void in the past several mos.  AUA score: I: 0/5 F: 4/5 I: 4/5 U: 0/5 W: 3/5 S: 5/5 N: 3/5 total: 19/35, mixed feelings. Most bothersome sx is having to push to empty. UA today >1000 glucose, otherwise neg. PVR 189mL.      Also, cardiologist ordered PSA as part of routine testing and pt's level found to be elevated at 5.76.      Prior PSA    8/31/18 1.75  6/17/16 2.09  5/2/15 2.01     No fhx of prostate cx or  cx.     At that visit;  we obtained a 4k score.     4K score of 45.6.  Repeat PSA on this test was 5.37.  Free PSA was 19%.      Biopsy was recommended.   Finasteride/alfuzosin was started for LUTS.     Family was unsure of how they wanted to proceed and wanted to discuss further.  We discussed further options for his elevated PSA including checking an MRI.  Family wants to avoid biopsy is much as possible given his age and comorbidities.  We will therefore proceed with MRI.  Order placed.  Family will schedule.  They will then return for a phone call with the patient available in 3 to 4 weeks.  \    , please arrange telephone call in 3/4 weeks.   Thanks  SD    
None

## 2024-07-05 ENCOUNTER — LAB ENCOUNTER (OUTPATIENT)
Dept: LAB | Facility: HOSPITAL | Age: 84
End: 2024-07-05
Attending: INTERNAL MEDICINE
Payer: MEDICAID

## 2024-07-05 DIAGNOSIS — I10 ESSENTIAL HYPERTENSION, BENIGN: ICD-10-CM

## 2024-07-05 DIAGNOSIS — E87.5 CHRONIC HYPERKALEMIA: ICD-10-CM

## 2024-07-05 DIAGNOSIS — N18.30 CHRONIC KIDNEY DISEASE, STAGE III (MODERATE) (HCC): ICD-10-CM

## 2024-07-05 DIAGNOSIS — R42 DIZZINESS AND GIDDINESS: ICD-10-CM

## 2024-07-05 DIAGNOSIS — R06.02 SHORTNESS OF BREATH: ICD-10-CM

## 2024-07-05 DIAGNOSIS — E11.69 DM TYPE 2 WITH DIABETIC DYSLIPIDEMIA (HCC): ICD-10-CM

## 2024-07-05 DIAGNOSIS — D64.9 ANEMIA, UNSPECIFIED: Primary | ICD-10-CM

## 2024-07-05 DIAGNOSIS — I25.10 CORONARY ATHEROSCLEROSIS OF NATIVE CORONARY ARTERY: ICD-10-CM

## 2024-07-05 DIAGNOSIS — E78.5 DM TYPE 2 WITH DIABETIC DYSLIPIDEMIA (HCC): ICD-10-CM

## 2024-07-05 DIAGNOSIS — E03.9 HYPOTHYROIDISM, ADULT: ICD-10-CM

## 2024-07-05 DIAGNOSIS — E11.9 DIABETES MELLITUS (HCC): ICD-10-CM

## 2024-07-05 LAB
ALBUMIN SERPL-MCNC: 3.7 G/DL (ref 3.4–5)
ALBUMIN/GLOB SERPL: 1 {RATIO} (ref 1–2)
ALP LIVER SERPL-CCNC: 105 U/L
ALT SERPL-CCNC: 31 U/L
ANION GAP SERPL CALC-SCNC: 2 MMOL/L (ref 0–18)
AST SERPL-CCNC: 32 U/L (ref 15–37)
BILIRUB SERPL-MCNC: 0.8 MG/DL (ref 0.1–2)
BUN BLD-MCNC: 26 MG/DL (ref 9–23)
CALCIUM BLD-MCNC: 9.3 MG/DL (ref 8.5–10.1)
CHLORIDE SERPL-SCNC: 107 MMOL/L (ref 98–112)
CO2 SERPL-SCNC: 28 MMOL/L (ref 21–32)
CREAT BLD-MCNC: 1.52 MG/DL
EGFRCR SERPLBLD CKD-EPI 2021: 45 ML/MIN/1.73M2 (ref 60–?)
EST. AVERAGE GLUCOSE BLD GHB EST-MCNC: 226 MG/DL (ref 68–126)
FASTING STATUS PATIENT QL REPORTED: YES
GLOBULIN PLAS-MCNC: 3.6 G/DL (ref 2.8–4.4)
GLUCOSE BLD-MCNC: 178 MG/DL (ref 70–99)
HBA1C MFR BLD: 9.5 % (ref ?–5.7)
OSMOLALITY SERPL CALC.SUM OF ELEC: 293 MOSM/KG (ref 275–295)
POTASSIUM SERPL-SCNC: 5.5 MMOL/L (ref 3.5–5.1)
PROT SERPL-MCNC: 7.3 G/DL (ref 6.4–8.2)
SODIUM SERPL-SCNC: 137 MMOL/L (ref 136–145)
TSI SER-ACNC: 4.97 MIU/ML (ref 0.36–3.74)

## 2024-07-05 PROCEDURE — 83036 HEMOGLOBIN GLYCOSYLATED A1C: CPT

## 2024-07-05 PROCEDURE — 84443 ASSAY THYROID STIM HORMONE: CPT

## 2024-07-05 PROCEDURE — 80053 COMPREHEN METABOLIC PANEL: CPT

## 2024-07-05 PROCEDURE — 36415 COLL VENOUS BLD VENIPUNCTURE: CPT

## 2024-07-17 ENCOUNTER — TELEPHONE (OUTPATIENT)
Dept: SURGERY | Facility: CLINIC | Age: 84
End: 2024-07-17

## 2024-07-17 DIAGNOSIS — R97.20 ELEVATED PSA: Primary | ICD-10-CM

## 2024-07-17 NOTE — TELEPHONE ENCOUNTER
New order placed with sedation. Unable to adjust original order because scheduled. Please have patient contact central scheduling to make change.

## 2024-07-17 NOTE — TELEPHONE ENCOUNTER
George doll patient is not able to do closed MRI without being sedated, was advised by central scheduling to contact office. Please call thank you.

## 2024-07-18 RX ORDER — TAMSULOSIN HYDROCHLORIDE 0.4 MG/1
0.4 CAPSULE ORAL EVERY EVENING
Qty: 90 CAPSULE | Refills: 3 | Status: SHIPPED | OUTPATIENT
Start: 2024-07-18

## 2024-07-18 NOTE — TELEPHONE ENCOUNTER
MRI order changed. Needs to have central scheduling switch. Daughter again, had many questions about pt's voiding habits. Was on silodosin in [place] but insurance [place]'t cover in [place]. Seems we offered alfuzosin before and this was not picked up for unknown reasons. Discussed alpha agonists will have dizziness/lightheadedness side effects. Can try tamsulosin and if having side effects, can stop and discuss options at next OV.

## 2024-07-18 NOTE — TELEPHONE ENCOUNTER
Spoke with Lizbet patient's daughter and informed her new order was placed and she would need to call CS.    Verbalized understanding.

## 2024-07-22 ENCOUNTER — TELEPHONE (OUTPATIENT)
Dept: NEPHROLOGY | Facility: CLINIC | Age: 84
End: 2024-07-22

## 2024-07-22 NOTE — TELEPHONE ENCOUNTER
Pt's daughter Lizbet called and wants to know if Oneal is to continue taking the sodium bicarbonate with the Lokelma, please advise?

## 2024-07-23 NOTE — TELEPHONE ENCOUNTER
Lets get labs done again.  I'll place order.  For now continue both the sodium bicarb and lokelma.    KP

## 2024-08-12 ENCOUNTER — TELEPHONE (OUTPATIENT)
Dept: NEPHROLOGY | Facility: CLINIC | Age: 84
End: 2024-08-12

## 2024-08-17 ENCOUNTER — LAB ENCOUNTER (OUTPATIENT)
Dept: LAB | Facility: HOSPITAL | Age: 84
End: 2024-08-17
Attending: INTERNAL MEDICINE
Payer: MEDICAID

## 2024-08-17 DIAGNOSIS — E87.5 HYPERKALEMIA: ICD-10-CM

## 2024-08-17 DIAGNOSIS — E11.65 TYPE 2 DIABETES MELLITUS WITH HYPERGLYCEMIA, WITHOUT LONG-TERM CURRENT USE OF INSULIN (HCC): ICD-10-CM

## 2024-08-17 LAB
ALBUMIN SERPL-MCNC: 4.6 G/DL (ref 3.2–4.8)
ALBUMIN/GLOB SERPL: 1.6 {RATIO} (ref 1–2)
ALP LIVER SERPL-CCNC: 109 U/L
ALT SERPL-CCNC: 21 U/L
ANION GAP SERPL CALC-SCNC: 4 MMOL/L (ref 0–18)
AST SERPL-CCNC: 24 U/L (ref ?–34)
BILIRUB SERPL-MCNC: 0.7 MG/DL (ref 0.2–1.1)
BUN BLD-MCNC: 28 MG/DL (ref 9–23)
CALCIUM BLD-MCNC: 9.8 MG/DL (ref 8.7–10.4)
CHLORIDE SERPL-SCNC: 104 MMOL/L (ref 98–112)
CO2 SERPL-SCNC: 25 MMOL/L (ref 21–32)
CREAT BLD-MCNC: 1.58 MG/DL
EGFRCR SERPLBLD CKD-EPI 2021: 43 ML/MIN/1.73M2 (ref 60–?)
EST. AVERAGE GLUCOSE BLD GHB EST-MCNC: 252 MG/DL (ref 68–126)
FASTING STATUS PATIENT QL REPORTED: NO
GLOBULIN PLAS-MCNC: 2.9 G/DL (ref 2–3.5)
GLUCOSE BLD-MCNC: 262 MG/DL (ref 70–99)
HBA1C MFR BLD: 10.4 % (ref ?–5.7)
OSMOLALITY SERPL CALC.SUM OF ELEC: 291 MOSM/KG (ref 275–295)
POTASSIUM SERPL-SCNC: 5.1 MMOL/L (ref 3.5–5.1)
PROT SERPL-MCNC: 7.5 G/DL (ref 5.7–8.2)
SODIUM SERPL-SCNC: 133 MMOL/L (ref 136–145)

## 2024-08-17 PROCEDURE — 80053 COMPREHEN METABOLIC PANEL: CPT

## 2024-08-17 PROCEDURE — 83036 HEMOGLOBIN GLYCOSYLATED A1C: CPT

## 2024-08-17 PROCEDURE — 36415 COLL VENOUS BLD VENIPUNCTURE: CPT

## 2024-08-23 ENCOUNTER — OFFICE VISIT (OUTPATIENT)
Dept: NEPHROLOGY | Facility: CLINIC | Age: 84
End: 2024-08-23
Payer: MEDICAID

## 2024-08-23 VITALS — SYSTOLIC BLOOD PRESSURE: 142 MMHG | DIASTOLIC BLOOD PRESSURE: 60 MMHG | WEIGHT: 149.13 LBS | BODY MASS INDEX: 24 KG/M2

## 2024-08-23 DIAGNOSIS — N18.32 STAGE 3B CHRONIC KIDNEY DISEASE (HCC): Primary | ICD-10-CM

## 2024-08-23 PROCEDURE — 99214 OFFICE O/P EST MOD 30 MIN: CPT | Performed by: INTERNAL MEDICINE

## 2024-08-23 NOTE — PROGRESS NOTES
Nephrology Consult Note      REASON FOR CONSULT:  CKD         HPI:   Oneal Luna is a 84 year old male with   Chief Complaint   Patient presents with    Chronic Kidney Disease    Hypertension     Jose Messina MD    83 y/o male with hx of longstanding DM/HTN, CAD, CKD- stg 3 (chart reviewed w/ Cr ~ 1.2 since 2015), hx of liver dysfunction - DILI- s/p steroids x 6 mos presents for follow up  evaluation. He is here w/ his son. Care reviewed w/ grand daughter via phone as well.  Patient is in his usual state of health.  He denies any sig nsaid use  No hx of kidney stones  No sig family hx of kidney problems  No etoh/tobacco/illicit drug use    Recent renal imaging reviewed- no sig anatomic issues  Prior UA has been fairly bland; microalbuminuria    A1C worsening - follows w/ Ms PING Norris    He has had problem w/ hyperkalemia and and hence not on any acei/arb  ON SGLT2i      ROS:    See above; 12 systems reviewed and otherwise unremarkable      PMH:  Past Medical History:    Arrhythmia    AFIB    ASHD (arteriosclerotic heart disease)    Atherosclerosis of coronary artery    Carotid artery stenosis    Cataract    Coronary atherosclerosis    Heart attack (HCC)    High blood pressure    High cholesterol    History of 2019 novel coronavirus disease (COVID-19)    cough, sore throat, headache, fever, body aches; no hospitalization, no lingering symptoms    Stroke (HCC)    no residual deficits    Type II or unspecified type diabetes mellitus without mention of complication, not stated as uncontrolled    Visual impairment    glasses. rupture vessel of R eye         PSH:  Past Surgical History:   Procedure Laterality Date    Angiogram      Angioplasty (coronary)      Cataract      Cath drug eluting stent      Cath percutaneous  transluminal coronary angioplasty      Colonoscopy N/A 1/9/2020    Procedure: COLONOSCOPY;  Surgeon: Arpan Solorzano MD;  Location:  ENDOSCOPY    Needle biopsy liver  03/2022= Cirrhosis    Repeat MRI  Abd 3/23         Medications (Active prior to today's visit):  Current Outpatient Medications   Medication Sig Dispense Refill    tamsulosin 0.4 MG Oral Cap Take 1 capsule (0.4 mg total) by mouth every evening. 90 capsule 3    DAPAGLIFLOZIN 10 MG Oral Tab Take 1 tablet by mouth once daily 90 tablet 0    glipiZIDE ER 2.5 MG Oral Tablet 24 Hr Take 1 tablet by mouth twice daily 180 tablet 0    sodium bicarbonate 650 MG Oral Tab Take 1 tablet (650 mg total) by mouth 2 (two) times daily. 180 tablet 2    finasteride 5 MG Oral Tab Take 1 tablet (5 mg total) by mouth daily. 90 tablet 3    Blood Glucose Monitoring Suppl (ONETOUCH VERIO) w/Device Does not apply Kit 1 each As Directed. 1 kit 0    Glucose Blood (ONETOUCH VERIO) In Vitro Strip Test blood sugar once daily, E11.65, no current insulin use 100 strip 3    Multiple Vitamins-Minerals (MULTIVITAMIN ADULTS OR) Take 1 tablet by mouth daily.      hydrALAZINE 100 MG Oral Tab Take 1 tablet (100 mg total) by mouth. 100mg in the AM and 50mg in the PM  0    OneTouch Delica Lancets 33G Does not apply Misc 1 each by Does not apply route 4 (four) times daily. 400 each 0    acetaminophen 325 MG Oral Tab Take 1 tablet (325 mg total) by mouth every 6 (six) hours as needed for Pain.      Clopidogrel Bisulfate 75 MG Oral Tab Take 1 tablet (75 mg total) by mouth daily.      rivaroxaban 15 MG Oral Tab Take 1 tablet (15 mg total) by mouth daily with food.           Allergies:  Allergies   Allergen Reactions    Trulicity [Dulaglutide] DIARRHEA     Weight loss/weakness        Social History:  Social History     Socioeconomic History    Marital status:    Tobacco Use    Smoking status: Never    Smokeless tobacco: Never   Vaping Use    Vaping status: Never Used   Substance and Sexual Activity    Alcohol use: No    Drug use: No          Family History:  Family History   Problem Relation Age of Onset    Hypertension Mother     Cancer Brother             PHYSICAL EXAM:   /60 (BP  Location: Left arm, Patient Position: Sitting)   Wt 149 lb 2 oz (67.6 kg)   BMI 24.07 kg/m²    Wt Readings from Last 6 Encounters:   08/23/24 149 lb 2 oz (67.6 kg)   04/05/24 148 lb 4 oz (67.2 kg)   12/01/23 149 lb 8 oz (67.8 kg)   10/30/23 151 lb 12.8 oz (68.9 kg)   10/27/23 154 lb (69.9 kg)   09/06/22 156 lb (70.8 kg)     General: Alert and oriented in no apparent distress.  HEENT: No scleral icterus, MMM  Neck: Supple, no LISA or thyromegaly  Cardiac: Regular rate and rhythm, S1, S2 normal, no murmur or rub  Lungs: Clear without wheezes, rales, rhonchi.    Abdomen: Soft, non-tender. + bowel sounds, no palpable organomegaly  Extremities: Without clubbing, cyanosis or edema.  Neurologic:  normal affect, cranial nerves grossly intact, moving all extremities  Skin: Warm and dry, no rashes    Renal US 2022: normal    Labs reviewed       ASSESSMENT/PLAN:   There are no diagnoses linked to this encounter.      CKD - Cr gradually increased over past decade. Suspect DKD given concurrent hx of longstanding DM; + microalbuminuria- otherwise, urine is fairly bland. 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 Zuñiga    Plan to f/u in 6 mos       Nav Melchor MD  8/23/2024

## 2024-08-24 ENCOUNTER — EKG ENCOUNTER (OUTPATIENT)
Dept: LAB | Facility: HOSPITAL | Age: 84
End: 2024-08-24
Payer: MEDICAID

## 2024-08-24 DIAGNOSIS — N40.0 BPH (BENIGN PROSTATIC HYPERPLASIA): ICD-10-CM

## 2024-08-24 LAB
ATRIAL RATE: 70 BPM
P AXIS: 36 DEGREES
P-R INTERVAL: 142 MS
Q-T INTERVAL: 404 MS
QRS DURATION: 106 MS
QTC CALCULATION (BEZET): 436 MS
R AXIS: -64 DEGREES
T AXIS: 99 DEGREES
VENTRICULAR RATE: 70 BPM

## 2024-08-24 PROCEDURE — 93005 ELECTROCARDIOGRAM TRACING: CPT

## 2024-08-24 PROCEDURE — 93010 ELECTROCARDIOGRAM REPORT: CPT | Performed by: INTERNAL MEDICINE

## 2024-08-26 NOTE — PROGRESS NOTES
No chief complaint on file.        Oneal is a 84 year old for type 2 DM medication management.   Jose Messina MD, primary care physician   Last DM appt   Gap in follow up since pt was in [place] for extended time    Son presents with pt; daughter in law Lizbet on phone as well   Most recent A1c 10.4 % ( last A1C 9.4%)   BG today on office 383 mg/dl (last meal < 2hrs: chapati, chicken and water)   Denies n/v or abd pain   Testing BG 1 x daily   Recall past 10-1 d   Testing before breakfast only   8- 161   8- 197  8-25 142  8-24 186  8-23 173  8-22 139   8-21 158  8-20 160  8-19 191  8-18 222  8-17 156    Not testing later in day   Largest meal Breakfast but also eats hearty dinner   Diagnosed with prostate cancer -MRI and lab f/u next month         When he was in [place], DM meds were changed; Daparyl VM (dapagliflozin, metformin and vildagliptin (daughter in law was contacting DM center)   At my last , trulicity was started- however pt developed diarrhea and loss of appetite so rx was stopped     Has resumed taking farxiga and glipizide ER 2.5mg     Hx  liver cirrhosis after liver biopsy- followed by GI     Recent appt w Dr Melchor, nephrology       Diabetes History:  Type 2 DM; ~     Patient has not had hospitalizations for blood sugar issues  denies any history of pancreatitis    Previous DM therapies:  metaglip /glimepiride : hypoglycemia   Metformin : liver disease   Trulicity 0.75m diarrhea, weigh tloss     Current DM Regimen:  Glipizide ER 2.5mg : 1 tab twice daily     Farxiga 10mg once daily in AM ( 9 am )       HGBA1C:    Lab Results   Component Value Date    A1C 10.4 (H) 2024    A1C 9.5 (H) 2024    A1C 8.9 (H) 2024     (H) 2024         Lab Results   Component Value Date    CHOLEST 149 2024    CHOLEST 134 2023    TRIG 121 2024    TRIG 100 2023    HDL 41 2024    HDL 36 (L) 2023    LDL 86 2024    LDL 79  05/05/2023     Lab Results   Component Value Date    MICROALBCREA 294.9 (H) 10/21/2023    MICROALBCREA  04/16/2022      Comment:      Unable to calculate due to Urine Creatinine <13.00 mg/dL        Lab Results   Component Value Date    CREATSERUM 1.58 (H) 08/17/2024    CREATSERUM 1.52 (H) 07/05/2024    EGFRCR 43 (L) 08/17/2024    EGFRCR 45 (L) 07/05/2024     Lab Results   Component Value Date    AST 24 08/17/2024    AST 32 07/05/2024    ALT 21 08/17/2024    ALT 31 07/05/2024       Lab Results   Component Value Date    TSH 4.970 (H) 07/05/2024    TSH 2.800 03/23/2024    T4F 1.1 10/21/2023             DM Complications:  Microvascular:   Neuropathy: yes  Retinopathy: no  Nephropathy: yes    Macrovascular:  PVD: no  CAD: yes, + stent. +AFIB   Stroke/CVA yes     Modifying factors:  Medication adherence: yes  Recent steroids, illness or infections: ( past 90d) no    Allergies: Trulicity [dulaglutide]     Current Outpatient Medications   Medication Sig Dispense Refill    tamsulosin 0.4 MG Oral Cap Take 1 capsule (0.4 mg total) by mouth every evening. 90 capsule 3    DAPAGLIFLOZIN 10 MG Oral Tab Take 1 tablet by mouth once daily 90 tablet 0    glipiZIDE ER 2.5 MG Oral Tablet 24 Hr Take 1 tablet by mouth twice daily 180 tablet 0    sodium bicarbonate 650 MG Oral Tab Take 1 tablet (650 mg total) by mouth 2 (two) times daily. 180 tablet 2    finasteride 5 MG Oral Tab Take 1 tablet (5 mg total) by mouth daily. 90 tablet 3    Blood Glucose Monitoring Suppl (ONETOUCH VERIO) w/Device Does not apply Kit 1 each As Directed. 1 kit 0    Glucose Blood (ONETOUCH VERIO) In Vitro Strip Test blood sugar once daily, E11.65, no current insulin use 100 strip 3    Multiple Vitamins-Minerals (MULTIVITAMIN ADULTS OR) Take 1 tablet by mouth daily.      hydrALAZINE 100 MG Oral Tab Take 1 tablet (100 mg total) by mouth. 100mg in the AM and 50mg in the PM  0    OneTouch Delica Lancets 33G Does not apply Misc 1 each by Does not apply route 4 (four)  times daily. 400 each 0    acetaminophen 325 MG Oral Tab Take 1 tablet (325 mg total) by mouth every 6 (six) hours as needed for Pain.      Clopidogrel Bisulfate 75 MG Oral Tab Take 1 tablet (75 mg total) by mouth daily.      rivaroxaban 15 MG Oral Tab Take 1 tablet (15 mg total) by mouth daily with food.           Past Medical History:    Arrhythmia    AFIB    ASHD (arteriosclerotic heart disease)    Atherosclerosis of coronary artery    Blood disorder    hx thrombocytopenia    Carotid artery stenosis    Cataract    Coronary atherosclerosis    Heart attack (HCC)    High blood pressure    High cholesterol    History of 2019 novel coronavirus disease (COVID-19)    cough, sore throat, headache, fever, body aches; no hospitalization, no lingering symptoms    Stroke (HCC)    no residual deficits    Type II or unspecified type diabetes mellitus without mention of complication, not stated as uncontrolled    Visual impairment    glasses. rupture vessel of R eye     Past Surgical History:   Procedure Laterality Date    Angiogram      Angioplasty (coronary)      Cataract      Cath drug eluting stent      Cath percutaneous  transluminal coronary angioplasty      Colonoscopy N/A 1/9/2020    Procedure: COLONOSCOPY;  Surgeon: Arpan Solorzano MD;  Location:  ENDOSCOPY    Needle biopsy liver  03/2022= Cirrhosis    Repeat MRI Abd 3/23     Social History     Socioeconomic History    Marital status:    Tobacco Use    Smoking status: Never    Smokeless tobacco: Never   Vaping Use    Vaping status: Never Used   Substance and Sexual Activity    Alcohol use: No    Drug use: No     Social Determinants of Health     Financial Resource Strain: Medium Risk (3/23/2022)    Received from [facility]    Overall Financial Resource Strain (CARDIA)     Difficulty of Paying Living Expenses: Somewhat hard   Transportation Needs: Unknown (3/23/2022)    Received from [facility]     PRAPARE - Transportation     Lack of Transportation (Medical): Patient declined     Lack of Transportation (Non-Medical): Patient declined   Physical Activity: Inactive (3/19/2021)    [facility]    Exercise Vital Sign     Days of Exercise per Week: 0 days     Minutes of Exercise per Session: 0 min     Family History   Problem Relation Age of Onset    Hypertension Mother     Cancer Brother          DM associated review of  symptoms:   Endocrine: Polyuria, polyphagia, polydipsia: no  Neurological: Paresthesias: yes  HEENT: Blurred vision: no  Skin: no rash or wounds.   Hematological: Hypoglycemia: no     Review of Systems   Respiratory: Negative for shortness of breath.    Cardiovascular: Negative for chest pain.   Gastrointestinal: Negative for nausea, diarrhea and constipation.    M/S : HX + painful feet (neuropathy)         Physical exam:  /58   Pulse 60   Wt 148 lb (67.1 kg)   BMI 23.89 kg/m²   Body mass index is 23.89 kg/m².    Physical Exam   Vitals reviewed.  Constitutional: Normal appearance   Cardiovascular: Normal rate , rhythm   Pulmonary/Chest: Effort normal  Neurological: Alert and oriented .   Psychiatric: Normal mood and affect.             Assessment/Plan:  CAD/CHF   Maintain w cardiology - Dr Jane Fountain   Tolerating  Farxiga rx   GLP1 rx intolerance (diarrhea/loss of appetite) despite  ASCVD benefits       CKD stage 3b  The DAPA-CKD trial showed that dapagliflozin results in improvement on renal function and mortality among patients with CKD, irrespective of DM status.  Continue  farxiga - see DM bNot a candidate for ace/arb or karendia due to hx hyperkalemia     Maintain w nephrology       Type 2 diabetes mellitus with microalbuminuria, without long-term current use of insulin ([facility])  A1C 10.4% ( last A1C 9.0%)  Weight:  148  lb ( last weight 151 lb)   Diabetes control improved is poorly controlled  Reviewed fasting trends appear to be mostly  in target given his age - but post prandial trends are above target  Would benefit from prandial insulin w largest meal -   Family has been reluctant to start insulin - patient and wife live alone and son/DIL are not sure patient could dose insulin independently -   Silvina pro placed today to assess glucose trends - can f/u with [facility] in 5- 7 days to review trends and review insulin administration       Per 2024 ADA standards of care for older population,  at this time it may be important for us to avoid hypoglycemia and slightly higher blood glucose levels should be accepted because we are more interested in preventing serious hypoglycemia episodes and side effects versus long term complications.Older adults who are otherwise healthy with few coexisting chronic illnesses and intact cognitive function and functional status should have lower glycemic goals (such as A1C <7.0-7.5% while those with multiple coexisting chronic illnesses, cognitive impairment, or functional dependence should have less stringent glycemic goals (such as A1C <8.0%)   GLP1 rx intolerance     Continue   Farxiga 10mg once daily in AM   Again discussed increase SMBG 2 x daily to check trends later in day     Reluctant to increase glipizide due to increased risk of  hypoglycemia       Reminded pt on A1C and blood sugar targets (Fasting 95- 140 and post prandial <200 ) and complications associated with hyperglycemia and uncontrolled DM (on AVS)   Reviewed s/s and treatment of hypoglycemia     The patient is asked to return in 4 m but recommended to contact DM clinic sooner if questions or concerns.        The patient indicates understanding of these issues and agrees to the plan.       No orders of the defined types were placed in this encounter.  The risks and benefits of my recommendations, as well as other treatment options were discussed with the patient today. questions were also answered to the best of my knowledge.

## 2024-08-27 ENCOUNTER — OFFICE VISIT (OUTPATIENT)
Dept: ENDOCRINOLOGY CLINIC | Facility: CLINIC | Age: 84
End: 2024-08-27
Payer: MEDICAID

## 2024-08-27 VITALS
WEIGHT: 148 LBS | HEART RATE: 60 BPM | SYSTOLIC BLOOD PRESSURE: 110 MMHG | DIASTOLIC BLOOD PRESSURE: 58 MMHG | BODY MASS INDEX: 24 KG/M2

## 2024-08-27 DIAGNOSIS — N18.31 STAGE 3A CHRONIC KIDNEY DISEASE (HCC): ICD-10-CM

## 2024-08-27 DIAGNOSIS — E11.65 TYPE 2 DIABETES MELLITUS WITH HYPERGLYCEMIA, WITHOUT LONG-TERM CURRENT USE OF INSULIN (HCC): Primary | ICD-10-CM

## 2024-08-27 DIAGNOSIS — I25.10 CORONARY ARTERY DISEASE INVOLVING NATIVE CORONARY ARTERY OF NATIVE HEART, UNSPECIFIED WHETHER ANGINA PRESENT: ICD-10-CM

## 2024-08-27 DIAGNOSIS — E78.5 DYSLIPIDEMIA: ICD-10-CM

## 2024-08-27 DIAGNOSIS — I10 ESSENTIAL HYPERTENSION: ICD-10-CM

## 2024-08-27 PROCEDURE — 99214 OFFICE O/P EST MOD 30 MIN: CPT | Performed by: NURSE PRACTITIONER

## 2024-08-29 ENCOUNTER — TELEPHONE (OUTPATIENT)
Dept: ENDOCRINOLOGY CLINIC | Facility: CLINIC | Age: 84
End: 2024-08-29

## 2024-08-29 NOTE — TELEPHONE ENCOUNTER
Patient had silvina pro placement on day internet went out scheduled appt for educator to remove and review and f/u appt with Chiquita.     Future Appointments   Date Time Provider Department Center   8/31/2024 11:00 AM  [facility]   9/3/2024 12:00 PM Joselyn Jerome RN [facility]   9/6/2024 11:30 AM  MR [facility] ([facility].5T WIDE[facility]   10/22/2024 10:45 AM Marlin Norris APRN [facility]   3/7/2025 11:00 AM Nav Melchor MD [facility] [place]     A continuous glucose sensor for 24 hour blood sugar monitoring was placed on patient today per APN order.   Serial NUMBER: [id number]  Exp date: (01/31/2025)  - After cleansing skin with alcohol prep, Sensor (Silvina pro )was inserted on  left Posterior upper arm area without difficulty placed 8/27/2024. Small amount of skin prep was added around sensor tape after placement to help with sensor adhesive.    Area remains free of any bleeding or irritation or pain at site when patient left DM center.  Patient instructions:   Record daily food/drink intake and activity in log book  Call Diabetes center with any questions or concerns.   instructed to record food, exercise, insulin doses (if taking) on log provided

## 2024-08-31 ENCOUNTER — LABORATORY ENCOUNTER (OUTPATIENT)
Dept: LAB | Facility: HOSPITAL | Age: 84
End: 2024-08-31
Payer: MEDICAID

## 2024-08-31 DIAGNOSIS — N40.0 BPH (BENIGN PROSTATIC HYPERPLASIA): ICD-10-CM

## 2024-08-31 DIAGNOSIS — N18.32 STAGE 3B CHRONIC KIDNEY DISEASE (HCC): ICD-10-CM

## 2024-08-31 LAB
ANION GAP SERPL CALC-SCNC: 8 MMOL/L (ref 0–18)
BUN BLD-MCNC: 25 MG/DL (ref 9–23)
CALCIUM BLD-MCNC: 9.3 MG/DL (ref 8.7–10.4)
CHLORIDE SERPL-SCNC: 105 MMOL/L (ref 98–112)
CO2 SERPL-SCNC: 24 MMOL/L (ref 21–32)
CREAT BLD-MCNC: 1.62 MG/DL
EGFRCR SERPLBLD CKD-EPI 2021: 42 ML/MIN/1.73M2 (ref 60–?)
FASTING STATUS PATIENT QL REPORTED: NO
GLUCOSE BLD-MCNC: 300 MG/DL (ref 70–99)
OSMOLALITY SERPL CALC.SUM OF ELEC: 300 MOSM/KG (ref 275–295)
POTASSIUM SERPL-SCNC: 5.2 MMOL/L (ref 3.5–5.1)
SODIUM SERPL-SCNC: 137 MMOL/L (ref 136–145)

## 2024-08-31 PROCEDURE — 80048 BASIC METABOLIC PNL TOTAL CA: CPT

## 2024-08-31 PROCEDURE — 36415 COLL VENOUS BLD VENIPUNCTURE: CPT

## 2024-09-03 ENCOUNTER — TELEPHONE (OUTPATIENT)
Dept: ENDOCRINOLOGY CLINIC | Facility: CLINIC | Age: 84
End: 2024-09-03

## 2024-09-03 ENCOUNTER — DIABETIC EDUCATION (OUTPATIENT)
Dept: ENDOCRINOLOGY CLINIC | Facility: CLINIC | Age: 84
End: 2024-09-03
Payer: MEDICAID

## 2024-09-03 DIAGNOSIS — E11.65 TYPE 2 DIABETES MELLITUS WITH HYPERGLYCEMIA, WITH LONG-TERM CURRENT USE OF INSULIN (HCC): Primary | ICD-10-CM

## 2024-09-03 DIAGNOSIS — E11.65 TYPE 2 DIABETES MELLITUS WITH HYPERGLYCEMIA, WITHOUT LONG-TERM CURRENT USE OF INSULIN (HCC): Primary | ICD-10-CM

## 2024-09-03 DIAGNOSIS — Z79.4 TYPE 2 DIABETES MELLITUS WITH HYPERGLYCEMIA, WITH LONG-TERM CURRENT USE OF INSULIN (HCC): Primary | ICD-10-CM

## 2024-09-03 PROCEDURE — 95251 CONT GLUC MNTR ANALYSIS I&R: CPT | Performed by: NURSE PRACTITIONER

## 2024-09-03 PROCEDURE — 95250 CONT GLUC MNTR PHYS/QHP EQP: CPT | Performed by: NURSE PRACTITIONER

## 2024-09-03 PROCEDURE — G0108 DIAB MANAGE TRN  PER INDIV: HCPCS

## 2024-09-03 RX ORDER — GLIPIZIDE 2.5 MG/1
TABLET, EXTENDED RELEASE ORAL
Qty: 90 TABLET | Refills: 0 | Status: SHIPPED | OUTPATIENT
Start: 2024-09-03

## 2024-09-03 RX ORDER — PEN NEEDLE, DIABETIC 32GX 5/32"
NEEDLE, DISPOSABLE MISCELLANEOUS
Qty: 200 EACH | Refills: 0 | Status: SHIPPED | OUTPATIENT
Start: 2024-09-03

## 2024-09-03 RX ORDER — KETOROLAC TROMETHAMINE 30 MG/ML
INJECTION, SOLUTION INTRAMUSCULAR; INTRAVENOUS
Qty: 1 EACH | Refills: 0 | Status: SHIPPED | OUTPATIENT
Start: 2024-09-03

## 2024-09-03 RX ORDER — INSULIN ASPART INJECTION 100 [IU]/ML
INJECTION, SOLUTION SUBCUTANEOUS
Qty: 3 ML | Refills: 0 | Status: SHIPPED | COMMUNITY
Start: 2024-09-03

## 2024-09-03 RX ORDER — BLOOD-GLUCOSE SENSOR
1 EACH MISCELLANEOUS
Qty: 3 EACH | Refills: 3 | Status: CANCELLED | OUTPATIENT
Start: 2024-09-03

## 2024-09-03 RX ORDER — BLOOD-GLUCOSE SENSOR
1 EACH MISCELLANEOUS
Qty: 2 EACH | Refills: 5 | Status: SHIPPED | OUTPATIENT
Start: 2024-09-03

## 2024-09-03 NOTE — TELEPHONE ENCOUNTER
Patient seen today, Fiasp sample given in office for use starting tomorrow. No diabetes education referral on file. Would like to start Silvina 3 as well. Orders pended for APRN.

## 2024-09-03 NOTE — PROGRESS NOTES
Oneal Luna  (6/20/1940) attended Diabetes Education.    Referring Provider: Marlin MELENDEZ    Date: 9/3/2024  Start time: 12:15 PM End time: 1:00 PM  _______________________________________________     Mr. Luna is a 84 year old male who presents today with his son in person and daughter-in-law via phone. He has been wearing the Silvina Pro sensor and presents today to review the data in person/discuss insulin injections. They declined  services.    His family reports he has done insulin injections in the past and got tired of it. They are concerned that this won't be an efficient way to treat him. We discussed the other medications he is on, indications for insulin, and dosing.    Assessment:       HEMOGLOBIN A1C (%)   Date Value   10/22/2019 7.1 (A)   02/09/2019 7.2 (A)   11/28/2017 7.6 (A)     HgbA1C (%)   Date Value   08/17/2024 10.4 (H)   07/05/2024 9.5 (H)   03/27/2024 8.9 (H)     Blood Glucose Levels:     Silvina Pro scanned and removed per protocol.    Report Period: 08/27/2024 - 09/03/2024 (8 days)  Generated: 09/03/2024  % Time CGM Active: 100%      Glucose Statistics and Targets  Average Glucose: 236 mg/dL  Glucose Management Indicator (GMI): 9.0%  Glucose Variability (%CV): 30.3%  Target Range: 70 - 180 mg/dL      Time in Ranges  Very High: >250 mg/dL --- 46%  High: 181 - 250 mg/dL --- 28%  Target Range: 70 - 180 mg/dL --- 26%  Low: 54 - 69 mg/dL --- 0%  Very Low: <54 mg/dL --- 0%      CGM report reviewed/printed and submitted to be scanned into chart. APRN reviewed Silvina Pro trends, recommending 3 units of Fiasp daily with breakfast, continue Farxiga 10mg, reduce Glipizide to 1x daily 2.5mg ER from 2x daily. Patient and family agreeable.    Recommended use of personal CGM for monitoring glucose levels/future adjustments to insulin. Per son, patient has an MRI on Friday and would prefer to wait until after the MRI to start the sensor. Discussed that he should be checking his blood  sugar at least fasting and 2 hours post-breakfast to analyze insulin dosage effectiveness. They are agreeable.    Of note, this is his current meal schedule:  Breakfast 9:30 AM/10 AM  Lunch 1-2 PM  Dinner after 7 PM    Medications:     Current DM management:     Oral:  Glipizide ER 2.5mg twice daily (After breakfast ~10 AM and at 7 PM)  Farxiga 10mg daily (in AM)    Family and patient are agreeable to starting insulin per APRN recommendations. Reviewed insulin admistration in detail. Provided educational handouts as well.    Type/Dose/Schedule: Fiasp 3 units daily with breakfast  Action/Side Effects/Precautions/Increasing Dosage: Yes  How to use pen:Yes  Storage/Disposal: Unopened pens remain refrigerated; once opened, can remain at room temp for 28 days. Need to dispose of needles in sharps container.  Travel Guidelines: Pack insulin & supplies in carry on luggage only;pack medication with label from pharmacy for travel      Education:     Diabetes Overview  Discussed benefits of blood glucose control and blood glucose targets, Reviewed signs, symptoms, prevention and treatment of hyperglycemia and hypoglycemia    Monitoring  Reinforced glucose monitoring schedules: at least 2x per day if not using CGM. Currently only checking Fasting.    Taking Medication  Reviewed medication use, action, timing, and side effects.   Injectables: Site selection, rotation, action, timing reviewed.    Reducing Risk  Reviewed overview of complications including: CV health and renal protection    Healthy Coping  Family involvement/support encouraged  Depression and diabetes reviewed.      Patients personal goals:     Take medications as prescribed  Improve glycemic control working towards an A1c of 7.0% or less  Testing blood glucose with meter/CGM working towards a time in range of 70% or higher.  Start insulin as prescribed.      Blood Glucose Goals  Blood sugar goals: less than 130 mg/dl in the morning (fasting) and less than 180  mg/dl 2 hours after meals.  Keep glucose tabs or fast acting carbohydrates with you for treatment of lows; for blood glucose levels less than 70 mg/dl, take 15 grams of fast acting carbohydrates (3-4 glucose tabs, 4 ounces of juice, or as discussed). Retest in 15 min. If not above 70 mg/dl, retreat.    Recommendations:     Start insulin as prescribed, Fiasp 3 units daily with breakfast.    Continue Farxiga as prescribed.    Reduce Glipizide to 1x daily.    Obtain personal CGM and return for training.    Glucose goals of:  Fasting  mg/dL  Post prandial <200 mg/dL     Check your blood sugars at least 2x time per day:  1.) Fasting (before you eat breakfast)  2.) 2 hours after you eat breakfast.  3.) May check additional times as needed for changes in symptoms.     Recommend follow up with diabetes educator in 1 week. Bring your glucose log book with you to your visits.     Follow up:   Recommended in 1 week. Patient's son requested to call in to schedule for CGM start.    Patient verbalized understanding and has no further questions at this time.    Joselyn Jerome, MAGGIEN-RN, [place]

## 2024-09-03 NOTE — PATIENT INSTRUCTIONS
Please start taking 3 units of Fiasp right before breakfast.    Continue the Farxiga 10 mg daily.    Reduce the Glipizide from twice a day to once a day, with breakfast.    Check your blood sugar:  - Fasting (when you first wake up)  - 2 hours after breakfast (2 hours after first bite). This blood sugar will let us know how well the insulin is working.    Because you are now on insulin injections, you qualify for coverage for the CGM (sensors). Marlin will send this to your pharmacy. Sensors cannot be worn during MRI or CT scans so you can wait until after the MRI to start this up or you can start the sensors once you pick them up, and then call the sensor company to ask for a replacement.     _______________________________________________     Always check your blood sugar if you feel any symptoms of a low blood sugar.      If you have a low blood sugar (less than 70 mg/dL) follow the \"Rule of 15\" to treat it:    1.) Take 15 grams of a quick acting carbohydrate (1 tablespoon of honey, 3-4 glucose tablets, 1/2 cup fruit juice, 1/2 can regular soda, or to-go pouch of applesauce). Only eat ONE 15g SERVING of a quick acting carbohydrate.    2.) Then check your blood sugar again after 15 minutes of drinking or eating the quick acting carbohydrate to be sure your blood sugar is above 70 mg/dL.     If your blood sugar is still less than 70 mg/dL, repeat treatment of 15 grams of a quick acting carbohydrate (1 tablespoon of honey, 3-4 glucose tablets, 1/2 cup fruit juice, 1/2 can regular soda, or to-go pouch  of applesauce).    3.) If your next meal is more than one hour away, eat a small snack. Try to have some protein and complex carbohydrate (peanut butter crackers, pretzels and cheese, etc).     4.) If you are not sure what caused your low blood sugar, call your healthcare provider.    5.) Always check your blood sugar before you drive.     _______________________________________________       To treat a low, we  recommend you carry with you easy, pre-portioned treatment for low blood sugars that are 15G of carbs:   - Children sized squeeze pouch applesauce (high fiber + carbs help prevent too high of a spike)  - Small children's sized juicebox- 15g carb --> 4oz juice box  - Glucose tablets from [employer], you can find them near diabetes supplies --> Note, you will need to eat 3-4 tablets to get to 15g of carbs  - Children sized fruit snack pack- look for one with 15 grams of total carbohydrate     AVOID complex carbs, or foods that contain fats along with carbs (like chocolate) can slow the absorption of glucose and should NOT be used to treat an emergency low.      _______________________________________________         Insulin, whether in pen or vial form, needs to kept in the refrigerator. Insulin is stable at room temperature for 28 days. Keep your insulin at room temperature while you are actively using it. Once that pen/vial runs out, remove another from the fridge to get to room temperature.     Injecting cold insulin can be uncomfortable.    Here are some other tips for insulin:  - Do not store your insulin near extreme heat or extreme cold.  - Never store insulin in the freezer, direct sunlight, or in the glove compartment of a car.  - Check the expiration date before using, and don't use any insulin beyond its expiration date.  - Examine the insulin closely to make sure the insulin looks normal before you dose.  - Use a new syringe or pen needle for each injection.  - Clean your skin off with an alcohol pad before each injection to prevent infections.    _______________________    INJECTING WITH PEN:    1) Wash your hands before beginning and the wipe the injection site with an alcohol swab or soap and water.    2) Make sure you have the correct type of insulin and a pen needle.    3) Inspect insulin for any abnormalities and wipe the rubber tip clean with an alcohol swab.    4) Attach a new needle with every  injection. Do NOT leave a needle on your pen.    5) Prime your insulin pen by shooting 2 units into the air (NOT YOU). If you do not see insulin come out of the needle, try again. If after the second priming you do not see insulin, get a new needle.    6) Dial the pen up from 0 units to the prescribed dose.    7) Insert the needle into your skin and push the button all the way in. Count to 10 while holding until the window reads 0. Withdraw the needle.    8) Remove pen needle and dispose in appropriate location. Recap pen with cap and store.    _____________________    WHERE TO INJECT:        Be sure to rotate where you are injecting. This ensures that you are getting the full insulin dose and it is being absorbed properly.

## 2024-09-03 NOTE — PROGRESS NOTES
[facility] Silvina pro CGM report/data  Needs to start Fiasp insulin at B/D meal   Will start 3 units breakfast starting tomorrow     Decrease glipizide XL 2.5mg  to once daily in AM   Continue Farxiga 10mg once daily       Due to dexterity, age, vision issues prefer flex pen w TOI so patient can count clicks for dosing and injection -will need PA     Also desires silvina 3 CGM w reader - may need PA     Orders Placed This Encounter    72 Hour glucose monitoring    Interpretation code 72 hour glucose monitor    Continuous Glucose Sensor (FREESTYLE SILVINA 3 SENSOR) Does not apply Misc     Si Device every 14 (fourteen) days.     Dispense:  2 each     Refill:  5    Continuous Glucose  (FREESTYLE SILVINA 3 READER) Does not apply Device     Si reader to use with silvina 3 cgm     Dispense:  1 each     Refill:  0     E11.65, on insulin    glipiZIDE ER 2.5 MG Oral Tablet 24 Hr     Sig: Take 1 tablet daily in AM     Dispense:  90 tablet     Refill:  0    Insulin Pen Needle (TRUEPLUS PEN NEEDLES) 32G X 4 MM Does not apply Misc     Si injections daily     Dispense:  200 each     Refill:  0

## 2024-09-04 ENCOUNTER — LABORATORY ENCOUNTER (OUTPATIENT)
Dept: LAB | Facility: HOSPITAL | Age: 84
End: 2024-09-04
Attending: INTERNAL MEDICINE
Payer: MEDICAID

## 2024-09-04 ENCOUNTER — TELEPHONE (OUTPATIENT)
Dept: ENDOCRINOLOGY CLINIC | Facility: CLINIC | Age: 84
End: 2024-09-04

## 2024-09-04 DIAGNOSIS — N40.0 BPH (BENIGN PROSTATIC HYPERPLASIA): ICD-10-CM

## 2024-09-04 DIAGNOSIS — N18.32 STAGE 3B CHRONIC KIDNEY DISEASE (HCC): ICD-10-CM

## 2024-09-04 LAB
BASOPHILS # BLD AUTO: 0.01 X10(3) UL (ref 0–0.2)
BASOPHILS NFR BLD AUTO: 0.2 %
BILIRUB UR QL STRIP.AUTO: NEGATIVE
CLARITY UR REFRACT.AUTO: CLEAR
COLOR UR AUTO: COLORLESS
CREAT UR-SCNC: 25.4 MG/DL
EOSINOPHIL # BLD AUTO: 0.02 X10(3) UL (ref 0–0.7)
EOSINOPHIL NFR BLD AUTO: 0.4 %
ERYTHROCYTE [DISTWIDTH] IN BLOOD BY AUTOMATED COUNT: 19.1 %
GLUCOSE UR STRIP.AUTO-MCNC: >1000 MG/DL
HCT VFR BLD AUTO: 41.2 %
HGB BLD-MCNC: 13.1 G/DL
IMM GRANULOCYTES # BLD AUTO: 0.02 X10(3) UL (ref 0–1)
IMM GRANULOCYTES NFR BLD: 0.4 %
KETONES UR STRIP.AUTO-MCNC: NEGATIVE MG/DL
LEUKOCYTE ESTERASE UR QL STRIP.AUTO: NEGATIVE
LYMPHOCYTES # BLD AUTO: 1.81 X10(3) UL (ref 1–4)
LYMPHOCYTES NFR BLD AUTO: 32.1 %
MCH RBC QN AUTO: 20.7 PG (ref 26–34)
MCHC RBC AUTO-ENTMCNC: 31.8 G/DL (ref 31–37)
MCV RBC AUTO: 65 FL
MONOCYTES # BLD AUTO: 0.5 X10(3) UL (ref 0.1–1)
MONOCYTES NFR BLD AUTO: 8.9 %
NEUTROPHILS # BLD AUTO: 3.27 X10 (3) UL (ref 1.5–7.7)
NEUTROPHILS # BLD AUTO: 3.27 X10(3) UL (ref 1.5–7.7)
NEUTROPHILS NFR BLD AUTO: 58 %
NITRITE UR QL STRIP.AUTO: NEGATIVE
PH UR STRIP.AUTO: 5.5 [PH] (ref 5–8)
PHOSPHATE SERPL-MCNC: 4.1 MG/DL (ref 2.4–5.1)
PLATELET # BLD AUTO: 111 10(3)UL (ref 150–450)
PLATELETS.RETICULATED NFR BLD AUTO: 4.2 % (ref 0–7)
PROT UR STRIP.AUTO-MCNC: NEGATIVE MG/DL
PROT UR-MCNC: 15.2 MG/DL (ref ?–14)
PTH-INTACT SERPL-MCNC: 94.8 PG/ML (ref 18.5–88)
RBC # BLD AUTO: 6.34 X10(6)UL
RBC UR QL AUTO: NEGATIVE
SP GR UR STRIP.AUTO: 1.02 (ref 1–1.03)
UROBILINOGEN UR STRIP.AUTO-MCNC: NORMAL MG/DL
VIT D+METAB SERPL-MCNC: 29.5 NG/ML (ref 30–100)
WBC # BLD AUTO: 5.6 X10(3) UL (ref 4–11)

## 2024-09-04 PROCEDURE — 36415 COLL VENOUS BLD VENIPUNCTURE: CPT

## 2024-09-04 PROCEDURE — 84156 ASSAY OF PROTEIN URINE: CPT

## 2024-09-04 PROCEDURE — 82306 VITAMIN D 25 HYDROXY: CPT

## 2024-09-04 PROCEDURE — 84100 ASSAY OF PHOSPHORUS: CPT

## 2024-09-04 PROCEDURE — 82570 ASSAY OF URINE CREATININE: CPT

## 2024-09-04 PROCEDURE — 81003 URINALYSIS AUTO W/O SCOPE: CPT

## 2024-09-04 PROCEDURE — 85025 COMPLETE CBC W/AUTO DIFF WBC: CPT

## 2024-09-04 PROCEDURE — 83970 ASSAY OF PARATHORMONE: CPT

## 2024-09-04 RX ORDER — PEN NEEDLE, DIABETIC 32GX 5/32"
NEEDLE, DISPOSABLE MISCELLANEOUS
Qty: 90 EACH | Refills: 0 | Status: CANCELLED | OUTPATIENT
Start: 2024-09-04

## 2024-09-04 RX ORDER — INSULIN ASPART 100 [IU]/ML
INJECTION, SOLUTION INTRAVENOUS; SUBCUTANEOUS
Qty: 6 ML | Refills: 1 | Status: SHIPPED | OUTPATIENT
Start: 2024-09-04

## 2024-09-04 NOTE — TELEPHONE ENCOUNTER
Received PA started through CMM unable too started PA through form attached OV notes and face sheet placed on CB desk to review and sign

## 2024-09-04 NOTE — TELEPHONE ENCOUNTER
Ordered novolog; will most likely need PA     Due to dexterity issues, impaired vision: pt would benefit from novolog flex touch for counting clicks to identify dosing

## 2024-09-04 NOTE — TELEPHONE ENCOUNTER
Received PA.  Could not confirm eligibility on CMM.    No coverage was found  No coverage was found. Please reconfirm the patient's plan before submitting. You may also return to the dashboard and select a Medicare form if the patient has Medicare Part D coverage.    Contacted pharmacy, they updated patient address and will send request with new key for PA.

## 2024-09-04 NOTE — TELEPHONE ENCOUNTER
Received voicemail from Jeane @ [facility] regarding fiasp.  Requested call back [phone] #4.  Returned call, they had questions about script sent yesterday.  Informed them it was a sample pen we provided to patient, please cancel script on file.  Will patient need insulin script/pen needles to pharmacy?

## 2024-09-05 ENCOUNTER — TELEPHONE (OUTPATIENT)
Dept: ENDOCRINOLOGY CLINIC | Facility: CLINIC | Age: 84
End: 2024-09-05

## 2024-09-05 ENCOUNTER — ANESTHESIA EVENT (OUTPATIENT)
Dept: MRI IMAGING | Facility: HOSPITAL | Age: 84
End: 2024-09-05
Payer: MEDICAID

## 2024-09-05 NOTE — TELEPHONE ENCOUNTER
Patient's daughter in law called in regarding his MRI tomorrow, needs to hold am meds and NPO prior.  Wanted to know if he should take his fiasp in am.  Advised no Fiasp tomorrow morning prior to MRI since he will not be having breakfast.

## 2024-09-06 ENCOUNTER — ANESTHESIA (OUTPATIENT)
Dept: MRI IMAGING | Facility: HOSPITAL | Age: 84
End: 2024-09-06
Payer: MEDICAID

## 2024-09-06 ENCOUNTER — HOSPITAL ENCOUNTER (OUTPATIENT)
Dept: MRI IMAGING | Facility: HOSPITAL | Age: 84
Discharge: HOME | End: 2024-09-06
Attending: PHYSICIAN ASSISTANT
Payer: MEDICAID

## 2024-09-06 VITALS
RESPIRATION RATE: 18 BRPM | HEART RATE: 52 BPM | WEIGHT: 149 LBS | HEIGHT: 66 IN | OXYGEN SATURATION: 100 % | TEMPERATURE: 97 F | DIASTOLIC BLOOD PRESSURE: 62 MMHG | SYSTOLIC BLOOD PRESSURE: 150 MMHG | BODY MASS INDEX: 23.95 KG/M2

## 2024-09-06 DIAGNOSIS — R97.20 ELEVATED PSA: ICD-10-CM

## 2024-09-06 DIAGNOSIS — N40.0 BPH (BENIGN PROSTATIC HYPERPLASIA): Primary | ICD-10-CM

## 2024-09-06 LAB — GLUCOSE BLD-MCNC: 166 MG/DL (ref 70–99)

## 2024-09-06 PROCEDURE — 82962 GLUCOSE BLOOD TEST: CPT

## 2024-09-06 PROCEDURE — 72197 MRI PELVIS W/O & W/DYE: CPT | Performed by: PHYSICIAN ASSISTANT

## 2024-09-06 PROCEDURE — A9575 INJ GADOTERATE MEGLUMI 0.1ML: HCPCS | Performed by: PHYSICIAN ASSISTANT

## 2024-09-06 RX ORDER — DEXTROSE MONOHYDRATE 25 G/50ML
50 INJECTION, SOLUTION INTRAVENOUS
Status: DISCONTINUED | OUTPATIENT
Start: 2024-09-06 | End: 2024-09-08

## 2024-09-06 RX ORDER — ONDANSETRON 2 MG/ML
4 INJECTION INTRAMUSCULAR; INTRAVENOUS EVERY 6 HOURS PRN
Status: DISCONTINUED | OUTPATIENT
Start: 2024-09-06 | End: 2024-09-08

## 2024-09-06 RX ORDER — NALOXONE HYDROCHLORIDE 0.4 MG/ML
0.08 INJECTION, SOLUTION INTRAMUSCULAR; INTRAVENOUS; SUBCUTANEOUS AS NEEDED
Status: ACTIVE | OUTPATIENT
Start: 2024-09-06 | End: 2024-09-06

## 2024-09-06 RX ORDER — LIDOCAINE HYDROCHLORIDE 10 MG/ML
INJECTION, SOLUTION EPIDURAL; INFILTRATION; INTRACAUDAL; PERINEURAL AS NEEDED
Status: DISCONTINUED | OUTPATIENT
Start: 2024-09-06 | End: 2024-09-06 | Stop reason: SURG

## 2024-09-06 RX ORDER — NICOTINE POLACRILEX 4 MG
15 LOZENGE BUCCAL
Status: DISCONTINUED | OUTPATIENT
Start: 2024-09-06 | End: 2024-09-08

## 2024-09-06 RX ORDER — NICOTINE POLACRILEX 4 MG
30 LOZENGE BUCCAL
Status: DISCONTINUED | OUTPATIENT
Start: 2024-09-06 | End: 2024-09-08

## 2024-09-06 RX ORDER — DIPHENHYDRAMINE HYDROCHLORIDE 50 MG/ML
12.5 INJECTION INTRAMUSCULAR; INTRAVENOUS AS NEEDED
Status: ACTIVE | OUTPATIENT
Start: 2024-09-06 | End: 2024-09-06

## 2024-09-06 RX ORDER — SODIUM CHLORIDE 9 MG/ML
INJECTION, SOLUTION INTRAVENOUS CONTINUOUS PRN
Status: DISCONTINUED | OUTPATIENT
Start: 2024-09-06 | End: 2024-09-06 | Stop reason: SURG

## 2024-09-06 RX ORDER — SODIUM CHLORIDE, SODIUM LACTATE, POTASSIUM CHLORIDE, CALCIUM CHLORIDE 600; 310; 30; 20 MG/100ML; MG/100ML; MG/100ML; MG/100ML
INJECTION, SOLUTION INTRAVENOUS CONTINUOUS
Status: DISCONTINUED | OUTPATIENT
Start: 2024-09-06 | End: 2024-09-08

## 2024-09-06 RX ORDER — METOCLOPRAMIDE HYDROCHLORIDE 5 MG/ML
5 INJECTION INTRAMUSCULAR; INTRAVENOUS EVERY 8 HOURS PRN
Status: DISCONTINUED | OUTPATIENT
Start: 2024-09-06 | End: 2024-09-08

## 2024-09-06 RX ORDER — INSULIN ASPART 100 [IU]/ML
INJECTION, SOLUTION INTRAVENOUS; SUBCUTANEOUS ONCE
Status: DISCONTINUED | OUTPATIENT
Start: 2024-09-06 | End: 2024-09-08

## 2024-09-06 RX ORDER — GADOTERATE MEGLUMINE 376.9 MG/ML
15 INJECTION INTRAVENOUS
Status: COMPLETED | OUTPATIENT
Start: 2024-09-06 | End: 2024-09-06

## 2024-09-06 RX ORDER — LABETALOL HYDROCHLORIDE 5 MG/ML
5 INJECTION, SOLUTION INTRAVENOUS EVERY 5 MIN PRN
Status: ACTIVE | OUTPATIENT
Start: 2024-09-06 | End: 2024-09-06

## 2024-09-06 RX ORDER — HYDRALAZINE HYDROCHLORIDE 20 MG/ML
5 INJECTION INTRAMUSCULAR; INTRAVENOUS ONCE
Status: COMPLETED | OUTPATIENT
Start: 2024-09-06 | End: 2024-09-06

## 2024-09-06 RX ORDER — SODIUM CHLORIDE 9 MG/ML
INJECTION, SOLUTION INTRAVENOUS CONTINUOUS
Status: DISCONTINUED | OUTPATIENT
Start: 2024-09-06 | End: 2024-09-08

## 2024-09-06 RX ADMIN — GADOTERATE MEGLUMINE 14 ML: 376.9 INJECTION INTRAVENOUS at 13:46:00

## 2024-09-06 RX ADMIN — SODIUM CHLORIDE: 9 INJECTION, SOLUTION INTRAVENOUS at 12:57:00

## 2024-09-06 RX ADMIN — LIDOCAINE HYDROCHLORIDE 25 MG: 10 INJECTION, SOLUTION EPIDURAL; INFILTRATION; INTRACAUDAL; PERINEURAL at 13:04:00

## 2024-09-06 RX ADMIN — HYDRALAZINE HYDROCHLORIDE 5 MG: 20 INJECTION INTRAMUSCULAR; INTRAVENOUS at 15:25:00

## 2024-09-06 NOTE — DISCHARGE INSTRUCTIONS
Magnetic Resonance Imaging (MRI)   Magnetic resonance imaging (MRI) is a type of test. It lets your healthcare provider see pictures of the inside of your body. MRI uses strong magnets, radio waves, and a computer to make an image. Because it uses strong magnets, you need to tell the healthcare team about any metal in or on your body.   How do I get ready for an MRI?   Follow any instructions for not eating or drinking before the test.  Ask if you should stop taking any medicine before the test.  Don’t wear makeup. Some makeup contains metal.  You will be asked if you have any metal in or on your body. This includes electronic devices or implants. It includes metal plates, screws, clips, pins, or wires. Tell them if you may have any other metal pieces in your body, such as metal shavings or metal left from an injury.  Remove your watch, jewelry, and hearing aids. Take all objects out of your pockets. This includes credit cards, pens, pocketknife, glasses, and other objects that may have metal.    What happens during an MRI?      You will be asked to hold very still during the scan.     Most MRI tests take 30 minutes to 1 hour. But the test may take longer. This depends on the type of MRI you have. During this time:   You may be asked to wear a hospital gown.  You may be given earplugs to wear. This is because the MRI machine can be loud.  You may be injected with a special dye called contrast. This makes the MRI image better.   You’ll lie down on a platform. The platform then slides into the MRI machine.  You’ll need to lie still as directed. Follow all instructions from the healthcare team during the scan.  The platform slides out of the MRI machine when the test is done.  What to tell your healthcare provider  Tell the healthcare team if you:   Have had an imaging test such as MRI or CT with contrast dye  Ever had a reaction to contrast dye  Are allergic to iodine or shellfish  Are allergic to any  medicines  Have diabetes or kidney disease  Had a liver transplant  Have any other serious health condition  Are or may be pregnant  Are breastfeeding  What happens after an MRI?   You can go back to normal activities right away.  Contrast dye will pass naturally through your body within 1 day. You may be told to drink more water or other fluids during this time.   Your healthcare provider will discuss the test results with you. This may be done during a follow-up visit or over the phone.  Your next appointment is: __________________     Nancy last reviewed this educational content on 9/1/2022 © 2000-2023 [employer]. All rights reserved. This information is not intended as a substitute for professional medical care. Always follow your healthcare professional's instructions.

## 2024-09-06 NOTE — ANESTHESIA POSTPROCEDURE EVALUATION
[facility]    Oneal Luna Patient Status:  Outpatient   Age/Gender 84 year old male MRN [id number]   Location [facility] MRI Attending Daysi Theodore PA-C   Hosp Day # 0 PCP Jose Messina MD       Anesthesia Post-op Note        Procedure Summary       Date: 09/06/24 Room / Location: [facility] Perioperative Service; [facility] MRI; [facility] X-ray    Anesthesia Start: 1257 Anesthesia Stop: 1410    Procedure: MRI PROSTATE (W+WO)(CPT=72197) Diagnosis: Elevated PSA    Scheduled Providers: Emilee Gaona MD Anesthesiologist: Emilee Gaona MD    Anesthesia Type: MAC ASA Status: 3            Anesthesia Type: MAC    Vitals Value Taken Time   /74 09/06/24 1454   Temp 96.9 °F (36.1 °C) 09/06/24 1406   Pulse 49 09/06/24 1455   Resp 18 09/06/24 1445   SpO2 99 % 09/06/24 1455   Vitals shown include unfiled device data.    Patient Location: Same Day Surgery    Anesthesia Type: MAC    Airway Patency: patent    Postop Pain Control: adequate    Mental Status: mildly sedated but able to meaningfully participate in the post-anesthesia evaluation    Nausea/Vomiting: none    Cardiopulmonary/Hydration status: stable euvolemic    Complications: no apparent anesthesia related complications    Postop vital signs: stable    Dental Exam: Unchanged from Preop    Patient to be discharged from PACU when criteria met.

## 2024-09-06 NOTE — ANESTHESIA PREPROCEDURE EVALUATION
PRE-OP EVALUATION    Patient Name: Oneal Luna    Admit Diagnosis: Elevated PSA [R97.20]    Pre-op Diagnosis: * No surgery found *        Anesthesia Procedure: MRI PROSTATE (W+WO)(CPT=72197)    * Surgery not found *    Pre-op vitals reviewed.        Body mass index is 24.05 kg/m².    Current medications reviewed.  Hospital Medications:  No current facility-administered medications on file as of 9/6/2024.       Outpatient Medications:   (Not in a hospital admission)      Allergies: Trulicity [dulaglutide]      Anesthesia Evaluation    Patient summary reviewed.    Anesthetic Complications  (-) history of anesthetic complications         GI/Hepatic/Renal             (+) chronic renal disease and CRI  (+) liver disease                 Cardiovascular      ECG reviewed.            (+) hypertension and poorly controlled  (+) hyperlipidemia  (+) CAD  (+) past MI  (+) CABG/stent         (+) dysrhythmias and atrial fibrillation                  Endo/Other      (+) diabetes and well controlled,                (+) thrombocytopenia           Pulmonary    Negative pulmonary ROS.                       Neuro/Psych                              Carotid stenosis-bilateral        Past Surgical History:   Procedure Laterality Date    Angiogram      Angioplasty (coronary)      Cataract      Cath drug eluting stent      Cath percutaneous  transluminal coronary angioplasty      Colonoscopy N/A 1/9/2020    Procedure: COLONOSCOPY;  Surgeon: Arpan Solorzano MD;  Location:  ENDOSCOPY    Needle biopsy liver  03/2022= Cirrhosis    Repeat MRI Abd 3/23     Social History     Socioeconomic History    Marital status:    Tobacco Use    Smoking status: Never    Smokeless tobacco: Never   Vaping Use    Vaping status: Never Used   Substance and Sexual Activity    Alcohol use: No    Drug use: No     History   Drug Use No     Available pre-op labs reviewed.  Lab Results   Component Value Date    WBC 5.6 09/04/2024    RBC 6.34 (H) 09/04/2024     HGB 13.1 09/04/2024    HCT 41.2 09/04/2024    MCV 65.0 (L) 09/04/2024    MCH 20.7 (L) 09/04/2024    MCHC 31.8 09/04/2024    RDW 19.1 09/04/2024    .0 (L) 09/04/2024     Lab Results   Component Value Date     08/31/2024    K 5.2 (H) 08/31/2024     08/31/2024    CO2 24.0 08/31/2024    BUN 25 (H) 08/31/2024    CREATSERUM 1.62 (H) 08/31/2024     (H) 08/31/2024    CA 9.3 08/31/2024            Airway      Mallampati: II  Mouth opening: >3 FB  TM distance: 4 - 6 cm  Neck ROM: full Cardiovascular      Rhythm: regular  Rate: normal     Dental             Pulmonary    Pulmonary exam normal.                 Other findings              ASA: 3   Plan: MAC and general  NPO status verified and           Plan/risks discussed with: patient and child/children                Present on Admission:  **None**

## 2024-09-10 ENCOUNTER — VIRTUAL PHONE E/M (OUTPATIENT)
Dept: SURGERY | Facility: CLINIC | Age: 84
End: 2024-09-10

## 2024-09-10 DIAGNOSIS — R97.20 ELEVATED PSA: Primary | ICD-10-CM

## 2024-09-10 DIAGNOSIS — N40.1 BPH WITH OBSTRUCTION/LOWER URINARY TRACT SYMPTOMS: ICD-10-CM

## 2024-09-10 DIAGNOSIS — N32.81 OAB (OVERACTIVE BLADDER): ICD-10-CM

## 2024-09-10 DIAGNOSIS — N13.8 BPH WITH OBSTRUCTION/LOWER URINARY TRACT SYMPTOMS: ICD-10-CM

## 2024-09-10 PROCEDURE — 99214 OFFICE O/P EST MOD 30 MIN: CPT

## 2024-09-10 NOTE — PROGRESS NOTES
Urology Clinic Note  Telemedicine Visit  Audio Only  The patient consented to performing this visit virtually.     Primary Care Provider:  @[facility]@     Chief Complaint:   Elevated PSA, BPH/LUTS    HPI:   Patient is a(n) 84 year old male with hx of afib (xarelto), CAD, HTN, HLD, CVA, DM (A1C 8.9 on 3/27/24) who presents for follow up for LUTS, elev PSA.     I initially saw pt for elevated PSA at 5.76. 4k at 45.6 repeat PSA 5.37. free PSA 19%.  Recommended prostate biopsy but family did not want to do this.  Prostate MRI was ordered instead.  Results with PI-RADS 2 lesion in the right mid gland of transitional zone.    I also started him on tamsulosin and finasteride given LUTS.  He feels like his stream has improved but still complains of frequency and urgency.  PVR at 4/5/2024 visit was 189 mL.    Having some lightheaded/dizziness but no worse than his baseline. Family was initially concerned about this so we tried sending silodosin and alfuzosin but both were denied by insurance.     History:     Past Medical History:    Arrhythmia    AFIB    ASHD (arteriosclerotic heart disease)    Atherosclerosis of coronary artery    Blood disorder    hx thrombocytopenia    Carotid artery stenosis    Cataract    Coronary atherosclerosis    Heart attack (HCC)    High blood pressure    High cholesterol    History of 2019 novel coronavirus disease (COVID-19)    cough, sore throat, headache, fever, body aches; no hospitalization, no lingering symptoms    Stroke (HCC)    no residual deficits    Type II or unspecified type diabetes mellitus without mention of complication, not stated as uncontrolled    Visual impairment    glasses. rupture vessel of R eye       Past Surgical History:   Procedure Laterality Date    Angiogram      Angioplasty (coronary)      Cataract      Cath drug eluting stent      Cath percutaneous  transluminal coronary angioplasty      Colonoscopy N/A 1/9/2020    Procedure: COLONOSCOPY;  Surgeon: Arpan Solorzano MD;   Location:  ENDOSCOPY    Needle biopsy liver  03/2022= Cirrhosis    Repeat MRI Abd 3/23       Family History   Problem Relation Age of Onset    Hypertension Mother     Cancer Brother        Social History     Socioeconomic History    Marital status:    Tobacco Use    Smoking status: Never    Smokeless tobacco: Never   Vaping Use    Vaping status: Never Used   Substance and Sexual Activity    Alcohol use: No    Drug use: No     Social Determinants of Health     Financial Resource Strain: Medium Risk (3/23/2022)    [facility]    Overall Financial Resource Strain (CARDIA)     Difficulty of Paying Living Expenses: Somewhat hard   Transportation Needs: Unknown (3/23/2022)    [facility]    PRAPARE - Transportation     Lack of Transportation (Medical): Patient declined     Lack of Transportation (Non-Medical): Patient declined   Physical Activity: Inactive (3/19/2021)    Received from [employer] [facility]    Exercise Vital Sign     Days of Exercise per Week: 0 days     Minutes of Exercise per Session: 0 min       Medications (Active prior to today's visit):  Current Outpatient Medications   Medication Sig Dispense Refill    insulin aspart (NOVOLOG FLEXPEN) 100 Units/mL Subcutaneous Solution Pen-injector Up to 10 units daily as directed at meals 6 mL 1    Insulin Aspart, w/Niacinamide, (FIASP FLEXTOUCH) 100 UNIT/ML Subcutaneous Solution Pen-injector EXP: 2025-10-31 LOT: [id number] 3 mL 0    Continuous Glucose Sensor (FREESTYLE LILLY 3 SENSOR) Does not apply Misc 1 Device every 14 (fourteen) days. 2 each 5    Continuous Glucose  (FREESTYLE LILLY 3 READER) Does not apply Device 1 reader to use with lilly 3 cgm 1 each 0    glipiZIDE ER 2.5 MG Oral Tablet 24 Hr Take 1 tablet daily in AM 90 tablet 0    Insulin Pen Needle (TRUEPLUS PEN NEEDLES) 32G X 4 MM Does not apply Misc 2 injections daily 200  each 0    tamsulosin 0.4 MG Oral Cap Take 1 capsule (0.4 mg total) by mouth every evening. 90 capsule 3    DAPAGLIFLOZIN 10 MG Oral Tab Take 1 tablet by mouth once daily 90 tablet 0    sodium bicarbonate 650 MG Oral Tab Take 1 tablet (650 mg total) by mouth 2 (two) times daily. 180 tablet 2    finasteride 5 MG Oral Tab Take 1 tablet (5 mg total) by mouth daily. 90 tablet 3    Blood Glucose Monitoring Suppl (ONETOUCH VERIO) w/Device Does not apply Kit 1 each As Directed. 1 kit 0    Glucose Blood (ONETOUCH VERIO) In Vitro Strip Test blood sugar once daily, E11.65, no current insulin use 100 strip 3    Multiple Vitamins-Minerals (MULTIVITAMIN ADULTS OR) Take 1 tablet by mouth daily.      hydrALAZINE 100 MG Oral Tab Take 1 tablet (100 mg total) by mouth. 100mg in the AM and 50mg in the PM  0    OneTouch Delica Lancets 33G Does not apply Misc 1 each by Does not apply route 4 (four) times daily. 400 each 0    acetaminophen 325 MG Oral Tab Take 1 tablet (325 mg total) by mouth every 6 (six) hours as needed for Pain.      Clopidogrel Bisulfate 75 MG Oral Tab Take 1 tablet (75 mg total) by mouth daily.      rivaroxaban 15 MG Oral Tab Take 1 tablet (15 mg total) by mouth daily with food.         Allergies:  Allergies   Allergen Reactions    Trulicity [Dulaglutide] DIARRHEA     Weight loss/weakness        Review of Systems:   A comprehensive 10-point review of systems was completed.  Pertinent positives and negatives are noted in the the HPI.    Physical Exam:   No physical exam performed during this telephone encounter.    Assessment & Plan:   Elevated PSA  - discussed need for bx given elevated PSA and high 4k score but family does not want to pursue any aggressive surgical mgmt at this time; prefer observation    BPH/LUTS  OAB  - continue flomax and finasteride  - office PVR and if <100mL, can begin myrbetriq 50mg for OAB  - given baseline dizziness/lightheadedness, would not recommend increasing flomax    A total of 40  minutes were spent on the visit including chart review in preparation for the visit, time with the patient, placing orders, including all time spent coordinating care with family and pt before this visit and communication with other providers where appropriate.

## 2024-09-10 NOTE — TELEPHONE ENCOUNTER
Tried to submit via Cover My Meds- on previously sent PA- still unable to find coverage at this time- still showed previous address.     Will try creating a new PA. Still not able to find coverage- will try to look into later- not able to check previous PA's on CoverMyMeds for comparison to make sure documentation is correct

## 2024-09-12 NOTE — TELEPHONE ENCOUNTER
Please call family and update them on coverage barriers for submitting PA for sensors, novolog rx   Also check in on blood sugars since starting Fiasp insulin rx : 3 units at B and D meals

## 2024-09-12 NOTE — TELEPHONE ENCOUNTER
Calling [employer] [phone] to check status spoke to rep through [employer] states they didn't received PA and faxed to refax to new number Clinical review [phone]    Sending to scan

## 2024-09-23 ENCOUNTER — TELEPHONE (OUTPATIENT)
Dept: ENDOCRINOLOGY CLINIC | Facility: CLINIC | Age: 84
End: 2024-09-23

## 2024-09-23 RX ORDER — DAPAGLIFLOZIN 10 MG/1
10 TABLET, FILM COATED ORAL DAILY
Qty: 90 TABLET | Refills: 1 | Status: SHIPPED | OUTPATIENT
Start: 2024-09-23

## 2024-09-23 RX ORDER — LANCETS 33 GAUGE
1 EACH MISCELLANEOUS 4 TIMES DAILY
Qty: 400 EACH | Refills: 0 | Status: SHIPPED | OUTPATIENT
Start: 2024-09-23

## 2024-09-23 NOTE — TELEPHONE ENCOUNTER
Contacted [employer], was transferred to Medicaid line.  Novolog/ Aspart flex pen was denied on 09/12/2024, letter received in Right fax.  Appeal?  Okay for additional sample if available in mean time or send different prandial insulin to pharmacy?

## 2024-09-23 NOTE — TELEPHONE ENCOUNTER
Daughter in law called sergio asking for lancet refill to be sent to [facility] pharmacy.re  Future Appointments   Date Time Provider Department Center   2024 11:00 AM Joselyn Jerome RN [facility]   10/22/2024 10:45 AM Marlin Castro APRN [facility]   3/7/2025 11:00 AM Nav Melchor MD [facility] [place]     Requested Prescriptions     Signed Prescriptions Disp Refills    OneTouch Delica Lancets 33G Does not apply Misc 400 each 0     Si each 4 (four) times daily.     Authorizing Provider: MARLIN CASTRO     Ordering User: MARSHAL QUEEN

## 2024-09-23 NOTE — TELEPHONE ENCOUNTER
Appeal   Given pt age, decreased vision: needs flex pen to be able to count clicks on insulin pen to confirm insulin doses for safe insulin dose and AVOIDANCE of hypoglycemia

## 2024-09-23 NOTE — TELEPHONE ENCOUNTER
Please review/edit appeal letter as necessary.  Once approved, will fax to [phone].  Patient will need to fill out authorized representative designation form.

## 2024-09-23 NOTE — TELEPHONE ENCOUNTER
Pt daughter in law called in asking for update on Novolog pt daughter in law not sure how much pt has but was informed by pharmacy it was not approved yet.     Will contact insurance to ask for update

## 2024-09-23 NOTE — TELEPHONE ENCOUNTER
Letter printed. Mary Grace HOYT has form patient needs to fill and sign to fax out with form pt is coming hseri for educator Silvina 3 start with Joselyn     Future Appointments   Date Time Provider Department Center   9/25/2024 11:00 AM Joselyn Jerome RN [facility]   10/22/2024 10:45 AM Marlin Norris APRN [facility]   3/7/2025 11:00 AM Nav Melchor MD [facility] [place]

## 2024-09-23 NOTE — TELEPHONE ENCOUNTER
Requested Prescriptions     Pending Prescriptions Disp Refills    FARXIGA 10 MG Oral Tab [Pharmacy Med Name: Farxiga 10 MG Oral Tablet] 90 tablet 0     Sig: Take 1 tablet by mouth once daily     Future Appointments   Date Time Provider Department Center   9/25/2024 11:00 AM Joselyn Jerome RN [facility]   10/22/2024 10:45 AM Marlin Norris APRN [facility]   3/7/2025 11:00 AM Nav Melchor MD [facility] [place]     Last A1c value was 10.4% done 8/17/2024.  Lov 8/27/24 abimbola   Refill 6/18/24 abimbola

## 2024-09-23 NOTE — TELEPHONE ENCOUNTER
Patient's daughter in law returned call to let us know that patient has only 1 pen of insulin. The sample that we gave him.

## 2024-09-24 ENCOUNTER — TELEPHONE (OUTPATIENT)
Dept: ENDOCRINOLOGY CLINIC | Facility: CLINIC | Age: 84
End: 2024-09-24

## 2024-09-24 NOTE — TELEPHONE ENCOUNTER
Patient daughter in law called in again for the BD pen needles Rx not received and the reader was also not given to patient daughter in law last time they went to  due to Novolog not bein approved yet or the sensors. Had called in yesterday and was told the sensor did get approved through insurance did go through but since it is on a manufacture back order they do not know when it will be in.     Called pharmacy again spoke to [facility] asked for them to see if the Rx for the BD needles were there and the Silvina reader as well. Silvina reader was deactivated due to pharmacist not handing to patient since Novolog was not approved they took back reader. Pharm tech was able to reactive Rx for reader and will get it ready for pt by 2pm, BD pen needles where also there waiting for patient to .

## 2024-09-25 ENCOUNTER — TELEPHONE (OUTPATIENT)
Dept: SURGERY | Facility: CLINIC | Age: 84
End: 2024-09-25

## 2024-09-25 ENCOUNTER — DIABETIC EDUCATION (OUTPATIENT)
Age: 84
End: 2024-09-25
Payer: MEDICAID

## 2024-09-25 ENCOUNTER — PATIENT MESSAGE (OUTPATIENT)
Age: 84
End: 2024-09-25

## 2024-09-25 DIAGNOSIS — E11.65 TYPE 2 DIABETES MELLITUS WITH HYPERGLYCEMIA, WITH LONG-TERM CURRENT USE OF INSULIN (HCC): Primary | ICD-10-CM

## 2024-09-25 DIAGNOSIS — Z79.4 TYPE 2 DIABETES MELLITUS WITH HYPERGLYCEMIA, WITH LONG-TERM CURRENT USE OF INSULIN (HCC): Primary | ICD-10-CM

## 2024-09-25 RX ORDER — INSULIN ASPART INJECTION 100 [IU]/ML
INJECTION, SOLUTION SUBCUTANEOUS
Qty: 3 ML | Refills: 0 | COMMUNITY
Start: 2024-09-25

## 2024-09-25 NOTE — TELEPHONE ENCOUNTER
Faxed appeal and designated authorized representative form to [employer] @ [phone], confirmed.

## 2024-09-25 NOTE — PATIENT INSTRUCTIONS
[employer] 3 clari is for Oneal to use INSTEAD of the reader.  Connect your Silvina to our office by completing the following steps:   From the account settings page, click on: \"MY Practices\" tab  Then enter the practice ID for [facility]: [id number]   _______________________________________________     Silvina Link Up will allow family/friends to view glucoses and receive alerts.  _______________________________________________    Please continue to look at glucoses before breakfast and 2 hours after breakfast to see how the insulin is working.    Please let us know of any further concerning glucoses.     Goals:  Fasting or before meals:  mg/dl  2 hours after meals: below 200 mg/dl

## 2024-09-25 NOTE — TELEPHONE ENCOUNTER
This encounter is now closed.     RN called daughter in law. She reports, patient is taking Finasteride and Tamsulosin every other day only and he goes to the washroom every hour.  Inquiring about the medications and side effects    Encouraged to continue to push fluids until urine is yellow to clear  Take medications at night d/t side effects of dizziness  Avoid constipation  Monitor intake and output  Taking time to urinate  Discussed last bladder scan in April  Offer to call office if needing nurse visit for bladder scan  Encouraged to remind patient to take medications as prescribed.     She agreed to plans and verbalized understanding.

## 2024-09-25 NOTE — PROGRESS NOTES
Oneal Luna   6/20/1940 was seen for Personal Continuous Glucose Monitoring Training/Start:    Date: 9/25/2024  Referring Provider: Marlin MELENDEZ Start time: 11:00 AM End time: 11:15 AM    Assessment:  Patient has blood glucose log from the last few weeks. Copy made and sent to scan. Provided patient more logs for further monitoring.    Fasting average: 155 mg/dl  2hr post breakfast average: 237 mg/dl     _______________________________________________     Sensor Type: Silvina 3    1. Differences in sensor glucose and blood glucose meter reading.  2. Always verify with a fingerstick if symptoms do not match sensor reading.  3. How to choose and rotate sensor sites/alternative sites. Injections should be given at least 3 inches away from where sensor is placed.    4. How to handle problems like MRI, CT scans, travel, etc.    5. Phone applications that may assist with using continuous glucose monitor.      Patient was able to set . He does not have phone with him. Reviewed steps to set up Silvina 3 clari on phone and Silvina Link Up with family. Reviewed that we do not have clinic access to  but can connect over once he has clari set up.    Patient requested assistance for inserting sensor.    Sensor placed: back of upper LUE    Sensor Settings:  High Alarm: 250   Low Alarm: 70      Current Oral Medication:  Farxiga 10 mg daily  Glipizide ER 2.5 mg daily    Current Insulin:  Fiasp 3 units before breakfast daily    Comments: son verbalized understanding.     Written materials were provided for all the content areas covered.  Patient verbalized understanding and has no further questions at this time.    Plan: Patient is to follow up with office as instructed.      Joselyn MARTELL-RN, [facility]

## 2024-09-25 NOTE — TELEPHONE ENCOUNTER
Per daughter-in-law patient is on finasteride and tamsulosin and now has to urinate every hour and touching cold water makes him instantly have to urinate and asking if this is okay. Please advise

## 2024-09-26 NOTE — TELEPHONE ENCOUNTER
Received approval for Novolog from 6/28/2024-9/25/2025 called pharmacy Novolog went through it will be ready in a hour.       Contacted daughter in law informed on approval

## 2024-09-30 ENCOUNTER — NURSE ONLY (OUTPATIENT)
Age: 84
End: 2024-09-30
Payer: MEDICAID

## 2024-09-30 ENCOUNTER — TELEPHONE (OUTPATIENT)
Dept: ENDOCRINOLOGY CLINIC | Facility: CLINIC | Age: 84
End: 2024-09-30

## 2024-09-30 DIAGNOSIS — Z79.4 TYPE 2 DIABETES MELLITUS WITH HYPERGLYCEMIA, WITH LONG-TERM CURRENT USE OF INSULIN (HCC): Primary | ICD-10-CM

## 2024-09-30 DIAGNOSIS — E11.65 TYPE 2 DIABETES MELLITUS WITH HYPERGLYCEMIA, WITH LONG-TERM CURRENT USE OF INSULIN (HCC): Primary | ICD-10-CM

## 2024-09-30 RX ORDER — BLOOD-GLUCOSE SENSOR
1 EACH MISCELLANEOUS
Qty: 2 EACH | Refills: 5 | Status: SHIPPED | OUTPATIENT
Start: 2024-09-30

## 2024-09-30 NOTE — TELEPHONE ENCOUNTER
Fiasp effect lasts up to 3-4 hrs so the glipizide may need to be stopped      Would prefer to see CGM readings (in past post prandial hyperglycemia noted.   If they cannot get sensor uploaded at home, schedule nurse visit -   If not available hold Glipizide rx next week and schedule upload in 1 week

## 2024-09-30 NOTE — TELEPHONE ENCOUNTER
Patient's daughter in law called in to report fasting glucose of 94 mg/dl this morning.  Other recent fasting glucose: 111,112, 153, 149.  Asymptomatic. Confirmed diabetes medications:  Jardiance 10 mg once daily  Glipizide ER 2.5 mg once daily  Fiasp 3 units with breakfast (Per daughter in law, he is injecting right after breakfast, fear of going low).  Using Silvina 3 but we are not connected.   was uploaded at visit with CDE. Reviewed ways to get connected if using phone, Practice code provided, also discussed creating ufindads account  to upload at home if using .  She will look into this and get back to our office with questions.   Adjustment needed?

## 2024-09-30 NOTE — TELEPHONE ENCOUNTER
Name: Oneal Luna  YOB: 1940  Report Period: 09/17/2024 - 09/30/2024 (14 days)  Generated: 09/30/2024  % Time CGM Active: 36%      Glucose Statistics and Targets  Average Glucose: 148 mg/dL  Glucose Management Indicator (GMI): 6.9%  Glucose Variability (%CV): 27.0%  Target Range: 70 - 180 mg/dL      Time in Ranges  Very High: >250 mg/dL --- 1%  High: 181 - 250 mg/dL --- 23%  Target Range: 70 - 180 mg/dL --- 76%  Low: 54 - 69 mg/dL --- 0%  Very Low: <54 mg/dL --- 0%      Lowest trends are 4 am - 7 am   Spikes after 9 am breakfast have improved  Slight spikes after 4 pm but back below 180 mg/dl after 7 pm     Due to improving trends w fiasp insulin , would recommend holding glipizide next 10 days  To call if fasting trends are over 175 or over 240 later in day (after B/D meals)       Continue    Farxiga 10 mg daily    Fiasp 3 units before breakfast daily

## 2024-09-30 NOTE — TELEPHONE ENCOUNTER
Call with daughter in law, they will bring reader in today.  Will try to assist with [employer] 3 clari if using.    Update:  Per daughter in law, pharmacy does not have Silvina 3 sensors in stock.  Sent for [employer] 3 plus.  Added our clinic code to [employer] clari, patient has 9 days left of current sensor.  Will start next sensor with phone application.

## 2024-09-30 NOTE — TELEPHONE ENCOUNTER
Call with [employer] reviewed plan per Marlin MELENDEZ.  They will hold glipizide until further notice.  Spike APRN will review again in 2 weeks.

## 2024-09-30 NOTE — TELEPHONE ENCOUNTER
Lizbet called in to inform office that dINK 3+ sensors will require a prior authorization.  Will watch for pharmacy request via fax or covermymeds.

## 2024-10-03 NOTE — TELEPHONE ENCOUNTER
Received voicemail from [employer] requesting call back and additional information.  Called [phone].  -[id number]  Requested we complete paperwork and fax back.  Completed and faxed back to [phone], confirmed.  Paperwork at my desk in PA folder.

## 2024-10-03 NOTE — TELEPHONE ENCOUNTER
Faxed PA request to [facility] @ [phone], with chart note from APRN and CDE.

## 2024-10-04 RX ORDER — BLOOD-GLUCOSE SENSOR
1 EACH MISCELLANEOUS
Qty: 2 EACH | Refills: 5 | Status: SHIPPED | OUTPATIENT
Start: 2024-10-04

## 2024-10-04 NOTE — TELEPHONE ENCOUNTER
LILLY 3 sensor was ordered ; not the 3+   Not sure why it was denied  Will send sensor over again for pharmacy to process

## 2024-10-04 NOTE — TELEPHONE ENCOUNTER
Incoming fax stating PA received 10/03/2024 10:23am was denied.    \"Freestyle Silvina 3 plus sensor miscellaneous-Formulary Exception-Denied  -You must have tried and failed two drugs covered by your plan for your condition. You have failed zero. YOu may not have to try and fail two more drugs if your prescriber states you cannot take them for health reasons. The covered drugs are:     Dexcom G6(, sensor, transmitter) or G7(, sensor)    Freestyle Silvina 14, 2, or 3 (reader, sensor)    Denial letter to provider for review.

## 2024-10-07 ENCOUNTER — TELEPHONE (OUTPATIENT)
Dept: ENDOCRINOLOGY CLINIC | Facility: CLINIC | Age: 84
End: 2024-10-07

## 2024-10-07 NOTE — TELEPHONE ENCOUNTER
Lizbet left message regarding patient's blood sugars and requested to speak with nurse regarding glucose elevations post meals.    Returned call, answered questions regarding glucose spikes.  Patient will change sensor on Wednesday and start sensor with the clari, patient will be streaming at that time.  Contact office with hypo / hyperglycemia.    Future Appointments   Date Time Provider Department Center   10/22/2024 10:45 AM Marlin Norris APRN [facility]   3/7/2025 11:00 AM Nav Melchor MD [facility] [place]

## 2024-10-23 LAB — AMB EXT GMI: 7.1 %

## 2024-10-24 ENCOUNTER — VIRTUAL PHONE E/M (OUTPATIENT)
Age: 84
End: 2024-10-24
Payer: MEDICAID

## 2024-10-24 DIAGNOSIS — N18.31 STAGE 3A CHRONIC KIDNEY DISEASE (HCC): ICD-10-CM

## 2024-10-24 DIAGNOSIS — E78.5 DYSLIPIDEMIA: ICD-10-CM

## 2024-10-24 DIAGNOSIS — E11.65 TYPE 2 DIABETES MELLITUS WITH HYPERGLYCEMIA, WITH LONG-TERM CURRENT USE OF INSULIN (HCC): Primary | ICD-10-CM

## 2024-10-24 DIAGNOSIS — I25.10 CORONARY ARTERY DISEASE INVOLVING NATIVE CORONARY ARTERY OF NATIVE HEART, UNSPECIFIED WHETHER ANGINA PRESENT: ICD-10-CM

## 2024-10-24 DIAGNOSIS — Z79.4 TYPE 2 DIABETES MELLITUS WITH HYPERGLYCEMIA, WITH LONG-TERM CURRENT USE OF INSULIN (HCC): Primary | ICD-10-CM

## 2024-10-24 DIAGNOSIS — I10 ESSENTIAL HYPERTENSION: ICD-10-CM

## 2024-10-24 PROCEDURE — 99214 OFFICE O/P EST MOD 30 MIN: CPT | Performed by: NURSE PRACTITIONER

## 2024-10-24 PROCEDURE — 95251 CONT GLUC MNTR ANALYSIS I&R: CPT | Performed by: NURSE PRACTITIONER

## 2024-10-24 NOTE — PATIENT INSTRUCTIONS
Your trends are much better   Estimated A1C 7.1% past 28 days on luis     Update labs before . No needs to fast for labs     It is expected to see some increase in trends after eating and with sweeter, higher carb foods, it can increase into upper 200 or low 300s. We are concerned about sustained high blood sugars ( more than 2.5 hours above 250 mg/dl )    If you start noticing the trends are NOT coming down after 2.5 hours please contact me          Continue   Farxiga 10mg once daily   Novolog 3 units daily w breakfast     The risk of hypoglycemia is 3 hours after dosing NOVOLOG     : blood sugar targets:     Fasting blood sugar (before breakfast) Target:    2 hours after eating less than 225      Call for blood sugars   greater than  250 more than 2 times in a week     If you are wearing the sensor, please look at your trends/averages    Recommendations:   GMI (estimated A1C ) target under  8%     Time in range (healthy blood sugar targets) : goal is over 50%   less than 70 : goal is less than 1%       Watch for low blood sugars: (less than 70 )    Treatment of Low Blood Glucose Action Plan  1. Check blood glucose to be sure that it is low. You cannot  always go by symptoms or how you feel. If in doubt, treat your low blood glucose anyway.  Rule of 15 :     2. Take 15 grams of carbohydrate (carb). Here are some choices:    4 oz. regular fruit juice  3-4 glucose tablets  6 oz. regular soda   7-8 jelly beans    3. Recheck blood glucose after 10-15 minutes. If blood glucose is still low (less than 70 mg/dl) repeat the treatment (step 2).    4. If your next meal is more than one hour away, eat a small snack.    5. If you’re not sure what caused your low blood glucose, call your healthcare provider.    6. Always check your blood glucose before you drive       To treat a low, I recommend you carry with you easy, pre-portioned treatment for low blood sugars that are 15G of carbs:   - Children sized squeeze  pouch applesauce (high fiber + carbs help prevent too high of a spike)  - Small children's sized juicebox- 15g carb --> 4oz juice box  - Glucose tablets from [employer], you can find them near diabetes supplies --> Note, you will need to eat 3-4 tablets to get to 15g of carbs  - Children sized fruit snack pack- look for one with 15 grams of total carbohydrate    AVOID complex carbs, or foods that contain fats along with carbs (like chocolate) can slow the absorption of glucose and should NOT be used to treat an emergency low

## 2024-10-24 NOTE — PROGRESS NOTES
Telehealth visit: Please note that the following visit was completed using two-way, real-time interactive audio communication.  Phone only since patient could not complete w video portion     Time Spent:  16 min     No chief complaint on file.        Oneal is a 84 year old for type 2 DM medication management. Daughter is on phone as well   Jose Messina MD, primary care physician   Last DM appt   Since last appointemnt, prandial insulin at breakfast has been started as well as personal cgm   Lab Results   Component Value Date    GMI 7.1 10/23/2024      Trends have really improved since starting novolog at breakfast     Holding glipizide since 2024 due to risk of hypoglycemia overnight -   Trends have really not changed since stopping glipizide       Reviewed CGM report/trends w patient today:  Silvina  (full download in media)   Sensor active time: 63 %    4 week (28-30 days)  GMI 7.1 %    Average glucose: 157 mg/dl     Hypoglycemia 0%    TIR 70 %  (ADA recommended goal > 70%)   Variability WNL ( < 33%)     For older and/or high-risk people with diabetes, the TIR target is lowered to >50% and hypoglycemia target  <1% at <70 mg/dL        Hx  liver cirrhosis after liver biopsy- followed by GI     Recent urology follow up; prostate MRI --> monitoring for now. No surgery due to age       Diabetes History:  Type 2 DM; ~     Patient has not had hospitalizations for blood sugar issues  denies any history of pancreatitis    Previous DM therapies:  metaglip /glimepiride/Glipizide ER 2.5mg :  hypoglycemia   Metformin : liver disease   Trulicity 0.75m diarrhea, weigh loss     Current DM Regimen:    Novolog flex pen: 3 units at breakfast daily   Farxiga 10mg once daily in AM     HGBA1C:    Lab Results   Component Value Date    A1C 10.4 (H) 2024    A1C 9.5 (H) 2024    A1C 8.9 (H) 2024     (H) 2024         Lab Results   Component Value Date    CHOLEST 149 2024     CHOLEST 134 05/05/2023    TRIG 121 03/23/2024    TRIG 100 05/05/2023    HDL 41 03/23/2024    HDL 36 (L) 05/05/2023    LDL 86 03/23/2024    LDL 79 05/05/2023     Lab Results   Component Value Date    MICROALBCREA 294.9 (H) 10/21/2023    MICROALBCREA  04/16/2022      Comment:      Unable to calculate due to Urine Creatinine <13.00 mg/dL        Lab Results   Component Value Date    CREATSERUM 1.62 (H) 08/31/2024    CREATSERUM 1.58 (H) 08/17/2024    EGFRCR 42 (L) 08/31/2024    EGFRCR 43 (L) 08/17/2024     Lab Results   Component Value Date    AST 24 08/17/2024    AST 32 07/05/2024    ALT 21 08/17/2024    ALT 31 07/05/2024       Lab Results   Component Value Date    TSH 4.970 (H) 07/05/2024    TSH 2.800 03/23/2024    T4F 1.1 10/21/2023         DM Complications:  Microvascular:   Neuropathy: yes  Retinopathy: no  Nephropathy: yes    Macrovascular:  PVD: no  CAD: yes, + stent. +AFIB   Stroke/CVA yes     Modifying factors:  Medication adherence: yes  Recent steroids, illness or infections: ( past 90d) no    Allergies: Trulicity [dulaglutide]     Current Outpatient Medications   Medication Sig Dispense Refill    Continuous Glucose Sensor (FREESTYLE LILLY 3 SENSOR) Does not apply Misc 1 Device every 14 (fourteen) days. 2 each 5    OneTouch Delica Lancets 33G Does not apply Misc 1 each 4 (four) times daily. 400 each 0    FARXIGA 10 MG Oral Tab Take 1 tablet by mouth once daily 90 tablet 1    insulin aspart (NOVOLOG FLEXPEN) 100 Units/mL Subcutaneous Solution Pen-injector Up to 10 units daily as directed at meals 6 mL 1    Continuous Glucose  (FREESTYLE LILLY 3 READER) Does not apply Device 1 reader to use with lilly 3 cgm 1 each 0    glipiZIDE ER 2.5 MG Oral Tablet 24 Hr Take 1 tablet daily in AM 90 tablet 0    Insulin Pen Needle (TRUEPLUS PEN NEEDLES) 32G X 4 MM Does not apply Misc 2 injections daily 200 each 0    tamsulosin 0.4 MG Oral Cap Take 1 capsule (0.4 mg total) by mouth every evening. 90 capsule 3    sodium  bicarbonate 650 MG Oral Tab Take 1 tablet (650 mg total) by mouth 2 (two) times daily. 180 tablet 2    finasteride 5 MG Oral Tab Take 1 tablet (5 mg total) by mouth daily. 90 tablet 3    Blood Glucose Monitoring Suppl (ONETOUCH VERIO) w/Device Does not apply Kit 1 each As Directed. 1 kit 0    Glucose Blood (ONETOUCH VERIO) In Vitro Strip Test blood sugar once daily, E11.65, no current insulin use 100 strip 3    Multiple Vitamins-Minerals (MULTIVITAMIN ADULTS OR) Take 1 tablet by mouth daily.      hydrALAZINE 100 MG Oral Tab Take 1 tablet (100 mg total) by mouth. 100mg in the AM and 50mg in the PM  0    acetaminophen 325 MG Oral Tab Take 1 tablet (325 mg total) by mouth every 6 (six) hours as needed for Pain.      Clopidogrel Bisulfate 75 MG Oral Tab Take 1 tablet (75 mg total) by mouth daily.      rivaroxaban 15 MG Oral Tab Take 1 tablet (15 mg total) by mouth daily with food.           Past Medical History:    Arrhythmia    AFIB    ASHD (arteriosclerotic heart disease)    Atherosclerosis of coronary artery    Blood disorder    hx thrombocytopenia    Carotid artery stenosis    Cataract    Coronary atherosclerosis    Heart attack (HCC)    High blood pressure    High cholesterol    History of 2019 novel coronavirus disease (COVID-19)    cough, sore throat, headache, fever, body aches; no hospitalization, no lingering symptoms    Stroke (HCC)    no residual deficits    Type II or unspecified type diabetes mellitus without mention of complication, not stated as uncontrolled    Visual impairment    glasses. rupture vessel of R eye     Past Surgical History:   Procedure Laterality Date    Angiogram      Angioplasty (coronary)      Cataract      Cath drug eluting stent      Cath percutaneous  transluminal coronary angioplasty      Colonoscopy N/A 1/9/2020    Procedure: COLONOSCOPY;  Surgeon: Arpan Solorzano MD;  Location:  ENDOSCOPY    Needle biopsy liver  03/2022= Cirrhosis    Repeat MRI Abd 3/23     Social History      Socioeconomic History    Marital status:    Tobacco Use    Smoking status: Never    Smokeless tobacco: Never   Vaping Use    Vaping status: Never Used   Substance and Sexual Activity    Alcohol use: No    Drug use: No     Social Drivers of Health     Financial Resource Strain: Medium Risk (3/23/2022)    [facility]    Overall Financial Resource Strain (CARDIA)     Difficulty of Paying Living Expenses: Somewhat hard   Transportation Needs: Unknown (3/23/2022)    [facility]    PRAPARE - Transportation     Lack of Transportation (Medical): Patient declined     Lack of Transportation (Non-Medical): Patient declined   Physical Activity: Inactive (3/19/2021)    [facility]    Exercise Vital Sign     Days of Exercise per Week: 0 days     Minutes of Exercise per Session: 0 min     Family History   Problem Relation Age of Onset    Hypertension Mother     Cancer Brother          DM associated review of  symptoms:   Endocrine: Polyuria, polyphagia, polydipsia: no  Neurological: Paresthesias: yes  HEENT: Blurred vision: no  Skin: no rash or wounds.   Hematological: Hypoglycemia: no     Review of Systems   Respiratory: Negative for shortness of breath.    Cardiovascular: Negative for chest pain.   Gastrointestinal: Negative for nausea, diarrhea and constipation.    M/S : HX + painful feet (neuropathy)         Physical exam:  There were no vitals taken for this visit.  There is no height or weight on file to calculate BMI.    Physical Exam     Constitutional: Normal appearance   Cardiovascular: not assessed today   Pulmonary/Chest: Effort normal.No conversational dyspnea          Assessment/Plan:  CAD/CHF   Maintain w cardiology - Dr Jane Fountain --> upcoming appt   Tolerating  Farxiga rx   GLP1 rx intolerance (diarrhea/loss of appetite) despite  ASCVD benefits       CKD stage 3b  The  DAPA-CKD trial showed that dapagliflozin results in improvement on renal function and mortality among patients with CKD, irrespective of DM status.  Continue  farxiga - see DM -->Not a candidate for ace/arb or karendia due to hx hyperkalemia     Maintain w nephrology       Type 2 diabetes mellitus with microalbuminuria, without long-term current use of insulin ([facility])  A1C 10.4% --> udpate due   GMI 7.1% past 28 days on Silvina 3   ( last weight 148 lb)   Diabetes control is improving but needs ongoing management and evaluation  given the chronicity of Diabetes, ongoing medication monitoring and risk for complications     On silvina fasting trends still well controlled and continuing w prandial insulin at breakfast has significantly improved his BG control       Per 2024 ADA standards of care for older population,  at this time it may be important for us to avoid hypoglycemia and slightly higher blood glucose levels should be accepted because we are more interested in preventing serious hypoglycemia episodes and side effects versus long term complications.Older adults who are otherwise healthy with few coexisting chronic illnesses and intact cognitive function and functional status should have lower glycemic goals (such as A1C <7.0-7.5% while those with multiple coexisting chronic illnesses, cognitive impairment, or functional dependence should have less stringent glycemic goals (such as A1C <8.0%)     Hx GLP1 rx intolerance   Trends stable  stopping glipizide  rx past 4 weeks; ok to stay off       Continue   Novolog flex pen: 3 units at breakfast   Farxiga 10mg once daily in AM           Reminded pt on A1C and blood sugar targets (Fasting 90- 150 and post prandial <200 ) and complications associated with hyperglycemia and uncontrolled DM (on AVS)   Reviewed s/s and treatment of hypoglycemia --> discussed risk 3- 4 hours after breakfast only     The patient is asked to return in 5 m but recommended to contact DM clinic  sooner if questions or concerns.        The patient indicates understanding of these issues and agrees to the plan.       Orders Placed This Encounter    Hemoglobin A1C     Standing Status:   Future     Standing Expiration Date:   10/24/2025    Basic Metabolic Panel (8)     Standing Status:   Future     Standing Expiration Date:   10/24/2025    GLUCOSE MANAGEMENT INDICATOR     This external order was created through the Results Console.     Order Specific Question:   Release to patient     Answer:   Immediate   The risks and benefits of my recommendations, as well as other treatment options were discussed with the patient today. questions were also answered to the best of my knowledge.       This has been done in good tylor to provide continuity of care in the best interest of the provider-patient relationship, due to the on-going public health crisis/national emergency and because of restrictions of visitation.  There are limitations of this visit as no or only very limited physical exam could be performed.  Every conscious effort was taken to allow for sufficient and adequate time.  This billing visit was spent on reviewing blood sugar trends, DM related labs, medications and decision making.  Appropriate medical decision-making and tests are ordered as detailed in the plan of care above.      Oneal Luna understands phone or video evaluation is not a substitute for face-to-face examination or emergency care. Patient advised to go to ER or call 911 for worsening symptoms or acute distress.     Marlin Norris, APRN

## 2024-12-31 RX ORDER — PEN NEEDLE, DIABETIC 32GX 5/32"
1 NEEDLE, DISPOSABLE MISCELLANEOUS 2 TIMES DAILY
Qty: 200 EACH | Refills: 0 | Status: SHIPPED | OUTPATIENT
Start: 2024-12-31

## 2025-01-07 ENCOUNTER — TELEPHONE (OUTPATIENT)
Dept: ENDOCRINOLOGY CLINIC | Facility: CLINIC | Age: 85
End: 2025-01-07

## 2025-01-07 DIAGNOSIS — E11.65 TYPE 2 DIABETES MELLITUS WITH HYPERGLYCEMIA, WITH LONG-TERM CURRENT USE OF INSULIN (HCC): Primary | ICD-10-CM

## 2025-01-07 DIAGNOSIS — Z79.4 TYPE 2 DIABETES MELLITUS WITH HYPERGLYCEMIA, WITH LONG-TERM CURRENT USE OF INSULIN (HCC): Primary | ICD-10-CM

## 2025-01-07 NOTE — TELEPHONE ENCOUNTER
Called pt's daughter, informed her pt is due for annual labs and to have done ASAP. Confirmed upcoming appt with CB in March as well.    Pended lipid and urine as well so pt can have all annual labs completed

## 2025-03-04 ENCOUNTER — TELEPHONE (OUTPATIENT)
Dept: ENDOCRINOLOGY CLINIC | Facility: CLINIC | Age: 85
End: 2025-03-04

## 2025-03-04 NOTE — TELEPHONE ENCOUNTER
Future Appointments   Date Time Provider Department Center   3/7/2025 11:00 AM Nav Melchor MD [facility]   3/13/2025 10:45 AM Marlin Norris APRN [facility]     Telehealth and lab reminder

## 2025-03-10 ENCOUNTER — LAB ENCOUNTER (OUTPATIENT)
Dept: LAB | Facility: HOSPITAL | Age: 85
End: 2025-03-10
Attending: NURSE PRACTITIONER
Payer: MEDICAID

## 2025-03-10 DIAGNOSIS — E11.65 TYPE 2 DIABETES MELLITUS WITH HYPERGLYCEMIA, WITH LONG-TERM CURRENT USE OF INSULIN (HCC): ICD-10-CM

## 2025-03-10 DIAGNOSIS — Z79.4 TYPE 2 DIABETES MELLITUS WITH HYPERGLYCEMIA, WITH LONG-TERM CURRENT USE OF INSULIN (HCC): ICD-10-CM

## 2025-03-10 LAB
ANION GAP SERPL CALC-SCNC: 6 MMOL/L (ref 0–18)
BUN BLD-MCNC: 36 MG/DL (ref 9–23)
CALCIUM BLD-MCNC: 9.7 MG/DL (ref 8.7–10.6)
CHLORIDE SERPL-SCNC: 108 MMOL/L (ref 98–112)
CO2 SERPL-SCNC: 27 MMOL/L (ref 21–32)
CREAT BLD-MCNC: 1.63 MG/DL
CREAT UR-SCNC: 46.6 MG/DL
EGFRCR SERPLBLD CKD-EPI 2021: 42 ML/MIN/1.73M2 (ref 60–?)
EST. AVERAGE GLUCOSE BLD GHB EST-MCNC: 186 MG/DL (ref 68–126)
FASTING STATUS PATIENT QL REPORTED: NO
GLUCOSE BLD-MCNC: 193 MG/DL (ref 70–99)
HBA1C MFR BLD: 8.1 % (ref ?–5.7)
MICROALBUMIN UR-MCNC: 5.1 MG/DL
MICROALBUMIN/CREAT 24H UR-RTO: 109.4 UG/MG (ref ?–30)
OSMOLALITY SERPL CALC.SUM OF ELEC: 306 MOSM/KG (ref 275–295)
POTASSIUM SERPL-SCNC: 5.6 MMOL/L (ref 3.5–5.1)
SODIUM SERPL-SCNC: 141 MMOL/L (ref 136–145)

## 2025-03-10 PROCEDURE — 82043 UR ALBUMIN QUANTITATIVE: CPT

## 2025-03-10 PROCEDURE — 80048 BASIC METABOLIC PNL TOTAL CA: CPT

## 2025-03-10 PROCEDURE — 83036 HEMOGLOBIN GLYCOSYLATED A1C: CPT

## 2025-03-10 PROCEDURE — 36415 COLL VENOUS BLD VENIPUNCTURE: CPT

## 2025-03-10 PROCEDURE — 82570 ASSAY OF URINE CREATININE: CPT

## 2025-03-12 LAB — AMB EXT GMI: 7.7 %

## 2025-03-12 NOTE — PROGRESS NOTES
Name: Oneal Luna  YOB: 1940  Report Period: 02/13/2025 - 03/12/2025 (28 days)  Generated: 03/12/2025  Time CGM Active: 98%      Glucose Statistics and Targets  Average Glucose: 184 mg/dL  Glucose Management Indicator (GMI): 7.7%  Glucose Variability (%CV): 26.0%  Target Range: 70 - 180 mg/dL      Time in Ranges  Very High: >250 mg/dL --- 8%  High: 181 - 250 mg/dL --- 45%  Target Range: 70 - 180 mg/dL --- 47%  Low: 54 - 69 mg/dL --- 0%  Very Low: <54 mg/dL --- 0%

## 2025-03-13 ENCOUNTER — VIRTUAL PHONE E/M (OUTPATIENT)
Facility: CLINIC | Age: 85
End: 2025-03-13
Payer: MEDICAID

## 2025-03-13 ENCOUNTER — TELEPHONE (OUTPATIENT)
Facility: CLINIC | Age: 85
End: 2025-03-13

## 2025-03-13 DIAGNOSIS — E11.65 TYPE 2 DIABETES MELLITUS WITH HYPERGLYCEMIA, WITH LONG-TERM CURRENT USE OF INSULIN (HCC): Primary | ICD-10-CM

## 2025-03-13 DIAGNOSIS — Z79.4 TYPE 2 DIABETES MELLITUS WITH HYPERGLYCEMIA, WITH LONG-TERM CURRENT USE OF INSULIN (HCC): Primary | ICD-10-CM

## 2025-03-13 DIAGNOSIS — E87.5 SERUM POTASSIUM ELEVATED: ICD-10-CM

## 2025-03-13 DIAGNOSIS — I10 ESSENTIAL HYPERTENSION: ICD-10-CM

## 2025-03-13 DIAGNOSIS — I25.10 CORONARY ARTERY DISEASE INVOLVING NATIVE CORONARY ARTERY OF NATIVE HEART, UNSPECIFIED WHETHER ANGINA PRESENT: ICD-10-CM

## 2025-03-13 DIAGNOSIS — N18.31 STAGE 3A CHRONIC KIDNEY DISEASE (HCC): ICD-10-CM

## 2025-03-13 DIAGNOSIS — E78.5 DYSLIPIDEMIA: ICD-10-CM

## 2025-03-13 PROCEDURE — 95251 CONT GLUC MNTR ANALYSIS I&R: CPT | Performed by: NURSE PRACTITIONER

## 2025-03-13 PROCEDURE — 99214 OFFICE O/P EST MOD 30 MIN: CPT | Performed by: NURSE PRACTITIONER

## 2025-03-13 RX ORDER — HYDROCHLOROTHIAZIDE 12.5 MG/1
1 CAPSULE ORAL
Qty: 2 EACH | Refills: 5 | Status: SHIPPED | OUTPATIENT
Start: 2025-03-13

## 2025-03-13 NOTE — TELEPHONE ENCOUNTER
Call with pharmacy, confirmed that glipizde was canceled in December and there are no more active scripts on file for this medication.

## 2025-03-13 NOTE — PATIENT INSTRUCTIONS
Trends are really improved however the largest increase in Blood sugar trends are after 7- 9 am     Please increase Novolog flex pen: 4 units at breakfast     Continue   Farxiga 10mg once daily in AM       Contact Dr Melchor about the high potassium level  I have ordered repeat labs to check potassium again in 1 week however see if Dr Melchor needs other labs or appointment     Studies have show some benefit with neuropathy symptoms with Alpha Lipoic Acid (ALA) supplement: 600 mg daily.     ALA is an antioxidant with the potential to diminish oxidative stress, improve the underlying pathophysiology of neuropathy, and reduce pain. It may take up to 3 weeks to notice the benefit        blood sugar targets:     Fasting blood sugar (before breakfast) Target:    2 hours after eating less than 200     Call for blood sugars less than  90  (only if occurring 4 hour of dosing NOVOLOG)  or greater than  300 more than 2 times in a week - and not coming down within 2 hours of eating on sensor     If you are wearing the sensor, please look at your trends/averages    Recommendations:   GMI (estimated A1C ) target under  8.5%     Time in range (healthy blood sugar targets) : goal is over 50%   less than 70 : goal is less than 1%     Watch for low blood sugars: (treat when less than 90 )    Treatment of Low Blood Glucose Action Plan  1. Check blood glucose to be sure that it is low. You cannot  always go by symptoms or how you feel. If in doubt, treat your low blood glucose anyway.  Rule of 15 :     2. Take 15 grams of carbohydrate (carb). Here are some choices:    4 oz. regular fruit juice  3-4 glucose tablets  6 oz. regular soda   7-8 jelly beans    3. Recheck blood glucose after 10-15 minutes. If blood glucose is still low (less than 70 mg/dl) repeat the treatment (step 2).    4. If your next meal is more than one hour away, eat a small snack.    5. If you’re not sure what caused your low blood glucose, call your healthcare  provider.    6. Always check your blood glucose before you drive       To treat a low, I recommend you carry with you easy, pre-portioned treatment for low blood sugars that are 15G of carbs:   - Children sized squeeze pouch applesauce (high fiber + carbs help prevent too high of a spike)  - Small children's sized juicebox- 15g carb --> 4oz juice box  - Glucose tablets from [employer], you can find them near diabetes supplies --> Note, you will need to eat 3-4 tablets to get to 15g of carbs  - Children sized fruit snack pack- look for one with 15 grams of total carbohydrate    AVOID complex carbs, or foods that contain fats along with carbs (like chocolate) can slow the absorption of glucose and should NOT be used to treat an emergency low

## 2025-03-13 NOTE — PROGRESS NOTES
Telehealth visit: Please note that the following visit was completed using two-way, real-time interactive audio communication.  Phone only since patient could not complete w video portion     Time Spent:  24 min     No chief complaint on file.        Oneal is a 84 year old for type 2 DM medication management. Daughter is on phone as well   Jose Messina MD, primary care physician   Last Diabetes appointment : 10-     Recent labs: BMP, urine m/alb and A1C   Elevated potassium on BMP; daughter has message into cardiology but has not heard back   Component      Latest Ref Rng 3/10/2025   Potassium      3.5 - 5.1 mmol/L 5.6 (H)         Most recent A1C 8.1%( last A1C 10.4%)   Lab Results   Component Value Date    GMI 7.7 2025    GMI 7.1 10/23/2024        Has done really well w Blood sugar trends since dosing NOVOLOG at breakfast   Appears glipizide prescription was dispensed  w #90 d supply   Patient confirmed he has supply but NOT dosing . Daughter will confirm         Reviewed CGM report/trends w patient today:  Silvina  (full download in media)   Sensor active time: 98 %    4 week (28-30 days)  GMI  7.7 %    Average glucose: 184 mg/dl     Hypoglycemia 0%    TIR 47%  (ADA recommended goal > 50%)   Variability WNL ( < 33%)     Trends do spike after breakfast     For older and/or high-risk people with diabetes, the TIR target is lowered to >50% and hypoglycemia target  <1% at <70 mg/dL        Hx  liver cirrhosis after liver biopsy- followed by GI --> no recent appts   Cardiology: last appointment      HX elevated PSA, prostate MRI --> monitoring for now. No surgery due to age       Diabetes History:  Type 2 DM; ~     Patient has not had hospitalizations for blood sugar issues  denies any history of pancreatitis    Previous DM therapies:  metaglip /glimepiride/Glipizide ER 2.5mg :  change in therapy, lack of response     Metformin : liver disease   Trulicity 0.75m diarrhea, weigh  loss     Current DM Regimen:    Novolog flex pen: 3 units at breakfast daily   Farxiga 10mg once daily in AM     HGBA1C:    Lab Results   Component Value Date    A1C 8.1 (H) 03/10/2025    A1C 10.4 (H) 08/17/2024    A1C 9.5 (H) 07/05/2024     (H) 03/10/2025         Lab Results   Component Value Date    CHOLEST 149 03/23/2024    CHOLEST 134 05/05/2023    TRIG 121 03/23/2024    TRIG 100 05/05/2023    HDL 41 03/23/2024    HDL 36 (L) 05/05/2023    LDL 86 03/23/2024    LDL 79 05/05/2023     Lab Results   Component Value Date    MICROALBCREA 109.4 (H) 03/10/2025    MICROALBCREA 294.9 (H) 10/21/2023      Lab Results   Component Value Date    CREATSERUM 1.63 (H) 03/10/2025    CREATSERUM 1.62 (H) 08/31/2024    EGFRCR 42 (L) 03/10/2025    EGFRCR 42 (L) 08/31/2024     Lab Results   Component Value Date    AST 24 08/17/2024    AST 32 07/05/2024    ALT 21 08/17/2024    ALT 31 07/05/2024       Lab Results   Component Value Date    TSH 4.970 (H) 07/05/2024    TSH 2.800 03/23/2024    T4F 1.1 10/21/2023         DM Complications:  Microvascular:   Neuropathy: yes  Retinopathy: no  Nephropathy: yes    Macrovascular:  PVD: no  CAD: yes, + stent. +AFIB   Stroke/CVA yes     Modifying factors:  Medication adherence: yes  Recent steroids, illness or infections: ( past 90d) no    Allergies: Trulicity [dulaglutide]     Current Outpatient Medications   Medication Sig Dispense Refill    Continuous Glucose Sensor (FREESTYLE LILLY 3 PLUS SENSOR) Does not apply Misc 1 Device every 15 (fifteen) days. 2 each 5    TRUEPLUS 5-BEVEL PEN NEEDLES 32G X 4 MM Does not apply Misc USE 2 INJECTIONS DAILY 200 each 0    OneTouch Delica Lancets 33G Does not apply Misc 1 each 4 (four) times daily. 400 each 0    FARXIGA 10 MG Oral Tab Take 1 tablet by mouth once daily 90 tablet 1    insulin aspart (NOVOLOG FLEXPEN) 100 Units/mL Subcutaneous Solution Pen-injector Up to 10 units daily as directed at meals 6 mL 1    Continuous Glucose  (FREESTYLE LILLY  3 READER) Does not apply Device 1 reader to use with luis 3 cgm 1 each 0    tamsulosin 0.4 MG Oral Cap Take 1 capsule (0.4 mg total) by mouth every evening. 90 capsule 3    sodium bicarbonate 650 MG Oral Tab Take 1 tablet (650 mg total) by mouth 2 (two) times daily. 180 tablet 2    finasteride 5 MG Oral Tab Take 1 tablet (5 mg total) by mouth daily. 90 tablet 3    Blood Glucose Monitoring Suppl (ONETOUCH VERIO) w/Device Does not apply Kit 1 each As Directed. 1 kit 0    Glucose Blood (ONETOUCH VERIO) In Vitro Strip Test blood sugar once daily, E11.65, no current insulin use 100 strip 3    Multiple Vitamins-Minerals (MULTIVITAMIN ADULTS OR) Take 1 tablet by mouth daily.      hydrALAZINE 100 MG Oral Tab Take 1 tablet (100 mg total) by mouth. 100mg in the AM and 50mg in the PM  0    acetaminophen 325 MG Oral Tab Take 1 tablet (325 mg total) by mouth every 6 (six) hours as needed for Pain.      Clopidogrel Bisulfate 75 MG Oral Tab Take 1 tablet (75 mg total) by mouth daily.      rivaroxaban 15 MG Oral Tab Take 1 tablet (15 mg total) by mouth daily with food.           Past Medical History:    Arrhythmia    AFIB    ASHD (arteriosclerotic heart disease)    Atherosclerosis of coronary artery    Blood disorder    hx thrombocytopenia    Carotid artery stenosis    Cataract    Coronary atherosclerosis    Heart attack (HCC)    High blood pressure    High cholesterol    History of 2019 novel coronavirus disease (COVID-19)    cough, sore throat, headache, fever, body aches; no hospitalization, no lingering symptoms    Stroke (HCC)    no residual deficits    Type II or unspecified type diabetes mellitus without mention of complication, not stated as uncontrolled    Visual impairment    glasses. rupture vessel of R eye     Past Surgical History:   Procedure Laterality Date    Angiogram      Angioplasty (coronary)      Cataract      Cath drug eluting stent      Cath percutaneous  transluminal coronary angioplasty      Colonoscopy  N/A 1/9/2020    Procedure: COLONOSCOPY;  Surgeon: Arpan Solrozano MD;  Location:  ENDOSCOPY    Needle biopsy liver  03/2022= Cirrhosis    Repeat MRI Abd 3/23     Social History     Socioeconomic History    Marital status:    Tobacco Use    Smoking status: Never    Smokeless tobacco: Never   Vaping Use    Vaping status: Never Used   Substance and Sexual Activity    Alcohol use: No    Drug use: No     Social Drivers of Health     Transportation Needs: Unknown (3/23/2022)    Received from [facility] - Transportation     Lack of Transportation (Medical): Patient declined     Lack of Transportation (Non-Medical): Patient declined     Family History   Problem Relation Age of Onset    Hypertension Mother     Cancer Brother          DM associated review of  symptoms:   Endocrine: Polyuria, polyphagia, polydipsia: no  Neurological: Paresthesias: yes  HEENT: Blurred vision: no  Skin: no rash or wounds.   Hematological: Hypoglycemia: no     Review of Systems   Respiratory: Negative for shortness of breath.    Cardiovascular: Negative for chest pain.   Gastrointestinal: Negative for nausea, diarrhea and constipation.    M/S : HX + painful feet (neuropathy)         Physical exam:  There were no vitals taken for this visit.  There is no height or weight on file to calculate BMI.    Physical Exam     Cardiovascular: not assessed today   Pulmonary/Chest: Effort normal. No conversational dyspnea          Assessment/Plan:  CAD/CHF   Maintain w cardiology - Dr Jane Fountain --> upcoming appt   Tolerating  Farxiga rx   GLP1 rx intolerance (diarrhea/loss of appetite) despite  ASCVD benefits     Elevated potassium  Recheck BMP  Contact Dr Melchor office       CKD stage 3b  Nephropathy   Reminded patient impact glycemic trends have on kidney health   Lab Results   Component Value Date    EGFRCR 42 (L) 03/10/2025    MICROALBCREA 109.4 (H) 03/10/2025      Needs ongoing monitoring   Maintain w  nephrology     The DAPA-CKD trial showed that dapagliflozin results in improvement on renal function and mortality among patients with CKD, irrespective of DM status.  Continue  farxiga - see DM -->Not a candidate for ace/arb or karendia due to hx hyperkalemia   Intolerant w GLP1 prescription       Neuropathy   Suggested trial of ALA 600mg daily to see if helps w painful neuropathy symptoms of B foot      Type 2 diabetes mellitus with microalbuminuria, without long-term current use of insulin (HCC)  A1C 8.1% (last A1C 10.4%)  GMI 7.7% past 28 days on Silvina 3   ( last weight 148 lb)   Diabetes control is improving. Needs ongoing management and evaluation  given the chronicity of Diabetes, ongoing medication monitoring and risk for complications       Per 2025 ADA standards of care for older population,  at this time it may be important for us to avoid hypoglycemia and slightly higher blood glucose levels should be accepted because we are more interested in preventing serious hypoglycemia episodes and side effects versus long term complications.Older adults who are otherwise healthy with few coexisting chronic illnesses and intact cognitive function and functional status should have lower glycemic goals (such as A1C <7.0-7.5% while those with multiple coexisting chronic illnesses, cognitive impairment, or functional dependence should have less stringent glycemic goals (such as A1C <8.0%)     Hx GLP1 rx intolerance   Be sure GLIPIZIDE IS NOT being dosed- due to risk of hypoglycemia overnight       Increase Novolog flex pen: 3--> 4 units at breakfast   Continue   Silvina 3+   Farxiga 10mg once daily in AM   ~reminded  to call if any symptoms of UTI and fungal infections. (Symptoms of  rash, itching in genital area or painful urination) . Reminded importance of hydration, Sick day management and avoidance of keto diets           Reminded pt on A1C and blood sugar targets (Fasting 90- 150 and post prandial <200 ) and  complications associated with hyperglycemia and uncontrolled DM (on AVS)   Reviewed s/s and treatment of hypoglycemia --> discussed risk 3- 4 hours after NOVOLOG doses     The patient is asked to return in 4-5m in office but recommended to contact DM clinic sooner if questions or concerns.        The patient indicates understanding of these issues and agrees to the plan.       Orders Placed This Encounter    Basic Metabolic Panel (8)     Standing Status:   Future     Standing Expiration Date:   3/13/2026    CBC With Differential With Platelet     Standing Status:   Future     Standing Expiration Date:   3/13/2026     Order Specific Question:   Release to patient     Answer:   Immediate    TSH W Reflex To Free T4     Standing Status:   Future     Standing Expiration Date:   3/13/2026     Order Specific Question:   Release to patient     Answer:   Immediate    GLUCOSE MANAGEMENT INDICATOR     This external order was created through the Results Console.     Order Specific Question:   Release to patient     Answer:   Immediate    Continuous Glucose Sensor (FREESTYLE LILLY 3 PLUS SENSOR) Does not apply Misc     Si Device every 15 (fifteen) days.     Dispense:  2 each     Refill:  5     Diabetes complications & risks surveillance:   A1C/Blood pressure: as reported    Last dilated eye exam: No data recorded Exam shows retinopathy? No data recorded  Last diabetic foot exam: No data recorded  Nephropathy screening:   NOT a candidate for  ace /arb rx.  (Hyperkalemia)  Lab Results   Component Value Date    EGFRCR 42 (L) 03/10/2025    MICROALBCREA 109.4 (H) 03/10/2025     LIPID screening:    Lab Results   Component Value Date    CHOLEST 149 2024    LDL 86 2024    TRIG 121 2024    HDL 41 2024    FASTING No 03/10/2025        The risks and benefits of my recommendations, as well as other treatment options were discussed with the patient today. questions were also answered to the best of my knowledge.        This has been done in good tylor to provide continuity of care in the best interest of the provider-patient relationship, due to the on-going public health crisis/national emergency and because of restrictions of visitation.  There are limitations of this visit as no or only very limited physical exam could be performed.  Every conscious effort was taken to allow for sufficient and adequate time.  This billing visit was spent on reviewing blood sugar trends, DM related labs, medications and decision making.  Appropriate medical decision-making and tests are ordered as detailed in the plan of care above.      Oneal Luna understands phone or video evaluation is not a substitute for face-to-face examination or emergency care. Patient advised to go to ER or call 911 for worsening symptoms or acute distress.     Marlin Norris, APRN

## 2025-03-13 NOTE — TELEPHONE ENCOUNTER
Please contact pharmacy and have them discontinue GLIPIZIDE off active prescription list - patient claire dup  from auto refill (was stopped )

## 2025-03-24 ENCOUNTER — TELEPHONE (OUTPATIENT)
Facility: CLINIC | Age: 85
End: 2025-03-24

## 2025-03-24 NOTE — TELEPHONE ENCOUNTER
Call with daughter in law, reviewed APRN recommendations for insulin adjustments.  Lizbet repeated new doses accurately.  No further questions at this time.

## 2025-03-24 NOTE — TELEPHONE ENCOUNTER
Call with Lizbet who reports they visited patient over the weekend and had some questions regarding medications.  Wondering if he needs mealtime insulin with other meals besides breakfast, or if he would be a good candidate for basal insulin. Daughter in law reported he's having lows overnight (in the 70's, but this was not noted on the report).  Silvina report printed to APRN.  Current medications:  Farxiga 10 mg once daily in am  Novolog 4 units with breakfast.

## 2025-03-24 NOTE — TELEPHONE ENCOUNTER
Based on CGM trends, spikes after 9 am, 3pm and 9 pm   Needs more prandial insulin     increase Breakfast dose to 5 units and add dinner dose: 4 units (no later than 9 pm  Continue Farxiga    NOTE:     Active insulin time for novolog is = 4 hours or less   Lower trends over night are not hypoglycemia risk since NOVOLOG will be out of system by 1 am

## 2025-04-07 RX ORDER — DAPAGLIFLOZIN 10 MG/1
10 TABLET, FILM COATED ORAL DAILY
Qty: 90 TABLET | Refills: 0 | Status: SHIPPED | OUTPATIENT
Start: 2025-04-07

## 2025-04-07 RX ORDER — FINASTERIDE 5 MG/1
5 TABLET, FILM COATED ORAL DAILY
Qty: 90 TABLET | Refills: 0 | Status: SHIPPED | OUTPATIENT
Start: 2025-04-07

## 2025-04-07 NOTE — TELEPHONE ENCOUNTER
Requested Prescriptions     Pending Prescriptions Disp Refills    FARXIGA 10 MG Oral Tab [Pharmacy Med Name: Farxiga 10 MG Oral Tablet] 90 tablet 0     Sig: Take 1 tablet by mouth once daily     HGBA1C:    Lab Results   Component Value Date    A1C 8.1 (H) 03/10/2025    A1C 10.4 (H) 08/17/2024    A1C 9.5 (H) 07/05/2024     (H) 03/10/2025     Your Appointments      Friday April 18, 2025 10:40 AM  Exam - Established with Nav Melchor MD  [facility] ([facility]) [address]  [facility] [id number] [phone]        Tuesday August 26, 2025 4:15 PM  Diabetes Pump follow up with NORA Lee  [facility] ([facility] [place]) [address]  [place] [address]  [phone]               Last Office Visit:  8--CB    Last Refill:  9--CB- 90 tabs with 1 refills-CB

## 2025-05-21 ENCOUNTER — OFFICE VISIT (OUTPATIENT)
Dept: FAMILY MEDICINE CLINIC | Facility: CLINIC | Age: 85
End: 2025-05-21
Payer: MEDICAID

## 2025-05-21 VITALS
SYSTOLIC BLOOD PRESSURE: 138 MMHG | DIASTOLIC BLOOD PRESSURE: 88 MMHG | RESPIRATION RATE: 14 BRPM | BODY MASS INDEX: 23.95 KG/M2 | HEART RATE: 59 BPM | WEIGHT: 149 LBS | HEIGHT: 66 IN

## 2025-05-21 DIAGNOSIS — M79.672 BILATERAL LEG AND FOOT PAIN: ICD-10-CM

## 2025-05-21 DIAGNOSIS — N18.32 TYPE 2 DIABETES MELLITUS WITH STAGE 3B CHRONIC KIDNEY DISEASE, WITHOUT LONG-TERM CURRENT USE OF INSULIN (HCC): ICD-10-CM

## 2025-05-21 DIAGNOSIS — M79.604 BILATERAL LEG AND FOOT PAIN: ICD-10-CM

## 2025-05-21 DIAGNOSIS — M79.605 BILATERAL LEG AND FOOT PAIN: ICD-10-CM

## 2025-05-21 DIAGNOSIS — E11.22 TYPE 2 DIABETES MELLITUS WITH STAGE 3B CHRONIC KIDNEY DISEASE, WITHOUT LONG-TERM CURRENT USE OF INSULIN (HCC): ICD-10-CM

## 2025-05-21 DIAGNOSIS — E11.42 DIABETIC POLYNEUROPATHY ASSOCIATED WITH TYPE 2 DIABETES MELLITUS (HCC): Primary | ICD-10-CM

## 2025-05-21 DIAGNOSIS — M79.671 BILATERAL LEG AND FOOT PAIN: ICD-10-CM

## 2025-05-21 PROCEDURE — 99215 OFFICE O/P EST HI 40 MIN: CPT | Performed by: FAMILY MEDICINE

## 2025-05-21 RX ORDER — GABAPENTIN 300 MG/1
300 CAPSULE ORAL 2 TIMES DAILY
Qty: 60 CAPSULE | Refills: 0 | Status: SHIPPED | OUTPATIENT
Start: 2025-05-21 | End: 2025-06-20

## 2025-05-21 RX ORDER — EZETIMIBE 10 MG/1
10 TABLET ORAL DAILY
COMMUNITY
Start: 2025-04-15

## 2025-05-22 NOTE — PROGRESS NOTES
The following individual(s) verbally consented to be recorded using ambient AI listening technology and understand that they can each withdraw their consent to this listening technology at any point by asking the clinician to turn off or pause the recording:    Patient name: Oneal Luna  Additional names:  no  Subjective:   Oneal Luna is a 84 year old male who presents for Follow - Up (Fall x1mon was sitting and tried to grab something, didn't hit head, hurt right lower back. and dm)       History/Other:   History of Present Illness  Oneal Luna is an 84 year old male with diabetes who presents with leg pain and neuropathy.    He experiences persistent pain in his legs and feet, described as a shaking sensation. The pain is constant, worsens in the evening, and is present even at rest. It does not improve with leg elevation. He describes the sensation as 'walking in the air' and reports an inability to feel anything in his feet, indicating a lack of sensation.    He has a history of diabetes and previously used gabapentin at a dose of 600 mg twice daily, which caused dizziness. He discontinued it due to side effects and changes in his blood pressure management. A lower dose of gabapentin helped him sleep better without causing dizziness.    Approximately four weeks ago, he experienced a fall while trying to grab something from his chair, resulting in a bruise near his rib area. He did not fall to the ground but lost his balance. The bruise is located near the rib, and he suspects a pulled muscle or similar injury.    His caregiver mentions that he frequently discusses his leg pain and wants relief.   Chief Complaint Reviewed and Verified  Nursing Notes Reviewed and   Verified  Tobacco Reviewed  Allergies Reviewed  Medical History   Reviewed  Surgical History Reviewed  Family History Reviewed  Social   History Reviewed         Tobacco:  He has never smoked tobacco.    Current  Medications[1]           Review of Systems:  Pertinent items are noted in HPI.  A comprehensive review of systems was negative.      Objective:   /88   Pulse 59   Resp 14   Ht 66\"   Wt 149 lb (67.6 kg)   BMI 24.05 kg/m²  Estimated body mass index is 24.05 kg/m² as calculated from the following:    Height as of this encounter: 66\".    Weight as of this encounter: 149 lb (67.6 kg).  Results         Physical Exam  GENERAL: Alert, cooperative, well developed, no acute distress.  HEENT: Normocephalic, normal oropharynx, moist mucous membranes.  CHEST: Clear to auscultation bilaterally, no wheezes, rhonchi, or crackles.  CARDIOVASCULAR: Normal heart rate and rhythm, S1 and S2 normal without murmurs.  ABDOMEN: Soft, non-tender, non-distended, without organomegaly, normal bowel sounds.  EXTREMITIES: No cyanosis or edema.  MUSCULOSKELETAL: Tenderness near the rib area.  NEUROLOGICAL: Cranial nerves grossly intact, moves all extremities without gross motor or sensory deficit.      Assessment & Plan:   1. Diabetic polyneuropathy associated with type 2 diabetes mellitus (HCC) (Primary)  2. Bilateral leg and foot pain  3. Type 2 diabetes mellitus with stage 3b chronic kidney disease, without long-term current use of insulin (HCC)  Other orders  -     Gabapentin; Take 1 capsule (300 mg total) by mouth in the morning and 1 capsule (300 mg total) before bedtime.  Dispense: 60 capsule; Refill: 0    Assessment & Plan  Diabetic neuropathy  Chronic neuropathic pain in the legs due to diabetic neuropathy, persistent and worsening in the evening, described as a shaking sensation. Previous gabapentin at 600 mg twice daily caused dizziness.  - Initiate gabapentin 300 mg twice daily to manage neuropathic pain and improve sleep.  - Monitor for dizziness; adjust dose if dizziness occurs.  - Advise use of a cane or walker to prevent falls.  - Send prescription to [facility] pharmacy on [address]  - Request follow-up in a few weeks  to assess effectiveness and side effects.    Rib pain, possible muscle strain or fracture  Pain in the rib area following a fall from a chair, likely muscle strain or rib fracture. No immediate concern for serious injury.  - Monitor pain; anticipate improvement over the next month.        No follow-ups on file.    Spent a total of 40 minutes obtaining history evaluating patient, reviewing medical chart, discussing treatment options and completing documentation.      Jose Messina MD, 5/22/2025, 9:55 AM             [1]   Current Outpatient Medications   Medication Sig Dispense Refill    ezetimibe 10 MG Oral Tab Take 1 tablet (10 mg total) by mouth daily.      gabapentin 300 MG Oral Cap Take 1 capsule (300 mg total) by mouth in the morning and 1 capsule (300 mg total) before bedtime. 60 capsule 0    FARXIGA 10 MG Oral Tab Take 1 tablet by mouth once daily 90 tablet 0    FINASTERIDE 5 MG Oral Tab Take 1 tablet by mouth once daily 90 tablet 0    Continuous Glucose Sensor (FREESTYLE LUIS 3 PLUS SENSOR) Does not apply Misc 1 Device every 15 (fifteen) days. 2 each 5    TRUEPLUS 5-BEVEL PEN NEEDLES 32G X 4 MM Does not apply Misc USE 2 INJECTIONS DAILY 200 each 0    OneTouch Delica Lancets 33G Does not apply Misc 1 each 4 (four) times daily. 400 each 0    insulin aspart (NOVOLOG FLEXPEN) 100 Units/mL Subcutaneous Solution Pen-injector Up to 10 units daily as directed at meals 6 mL 1    Continuous Glucose  (FREESTYLE LUIS 3 READER) Does not apply Device 1 reader to use with luis 3 cgm 1 each 0    tamsulosin 0.4 MG Oral Cap Take 1 capsule (0.4 mg total) by mouth every evening. 90 capsule 3    sodium bicarbonate 650 MG Oral Tab Take 1 tablet (650 mg total) by mouth 2 (two) times daily. 180 tablet 2    Blood Glucose Monitoring Suppl (ONETOUCH VERIO) w/Device Does not apply Kit 1 each As Directed. 1 kit 0    Glucose Blood (ONETOUCH VERIO) In Vitro Strip Test blood sugar once daily, E11.65, no current insulin use 100  strip 3    Multiple Vitamins-Minerals (MULTIVITAMIN ADULTS OR) Take 1 tablet by mouth daily.      hydrALAZINE 100 MG Oral Tab Take 1 tablet (100 mg total) by mouth. 100mg in the AM and 50mg in the PM  0    acetaminophen 325 MG Oral Tab Take 1 tablet (325 mg total) by mouth every 6 (six) hours as needed for Pain.      Clopidogrel Bisulfate 75 MG Oral Tab Take 1 tablet (75 mg total) by mouth daily.      rivaroxaban 15 MG Oral Tab Take 1 tablet (15 mg total) by mouth daily with food.

## 2025-06-23 RX ORDER — INSULIN ASPART 100 [IU]/ML
INJECTION, SOLUTION INTRAVENOUS; SUBCUTANEOUS
Qty: 6 ML | Refills: 0 | Status: SHIPPED | OUTPATIENT
Start: 2025-06-23

## 2025-06-23 RX ORDER — GABAPENTIN 300 MG/1
CAPSULE ORAL
Qty: 60 CAPSULE | Refills: 0 | Status: SHIPPED | OUTPATIENT
Start: 2025-06-23

## 2025-06-23 NOTE — TELEPHONE ENCOUNTER
Requested Prescriptions     Pending Prescriptions Disp Refills    NOVOLOG FLEXPEN RELION 100 UNIT/ML Subcutaneous Solution Pen-injector [Pharmacy Med Name: NovoLOG FlexPen ReliOn 100 UNIT/ML Subcutaneous Solution Pen-injector] 6 mL 0     Sig: INJECT UP TO 10 UNITS SUBCUTANEOUSLY AS DIRECTED AT  MEALS     Future Appointments   Date Time Provider Department Center   8/26/2025  4:15 PM Marlin Norris APRN [facility]     Last A1c value was 8.1% done 3/10/2025.  Refill 09/04/24 [facility]   LOV 03/13/25 [facility]

## 2025-06-30 ENCOUNTER — TELEPHONE (OUTPATIENT)
Facility: CLINIC | Age: 85
End: 2025-06-30

## 2025-06-30 DIAGNOSIS — Z79.4 TYPE 2 DIABETES MELLITUS WITH HYPERGLYCEMIA, WITH LONG-TERM CURRENT USE OF INSULIN (HCC): Primary | ICD-10-CM

## 2025-06-30 DIAGNOSIS — E11.65 TYPE 2 DIABETES MELLITUS WITH HYPERGLYCEMIA, WITH LONG-TERM CURRENT USE OF INSULIN (HCC): Primary | ICD-10-CM

## 2025-06-30 LAB — AMB EXT GMI: 6.6 %

## 2025-06-30 NOTE — TELEPHONE ENCOUNTER
Reviewed luis   Lab Results   Component Value Date    GMI 6.6 06/30/2025    GMI 7.7 03/12/2025    GMI 7.1 10/23/2024

## 2025-07-02 RX ORDER — DAPAGLIFLOZIN 10 MG/1
10 TABLET, FILM COATED ORAL DAILY
Qty: 90 TABLET | Refills: 0 | Status: SHIPPED | OUTPATIENT
Start: 2025-07-02

## 2025-07-02 NOTE — TELEPHONE ENCOUNTER
Requested Prescriptions     Pending Prescriptions Disp Refills    FARXIGA 10 MG Oral Tab [Pharmacy Med Name: Farxiga 10 MG Oral Tablet] 90 tablet 0     Sig: Take 1 tablet by mouth once daily     HGBA1C:    Lab Results   Component Value Date    A1C 8.1 (H) 03/10/2025    A1C 10.4 (H) 08/17/2024    A1C 9.5 (H) 07/05/2024     (H) 03/10/2025     Your Appointments      Tuesday August 26, 2025 4:15 PM  Diabetes Pump follow up with Marlin Norris, [facility] ([facility] [place]) [address], [address] [address]  [phone]               Last Office Visit:   8--CB    Last Refill:   4-7-2025-90 tabs with 0 refills-CB

## 2025-07-07 RX ORDER — GABAPENTIN 300 MG/1
CAPSULE ORAL
Qty: 60 CAPSULE | Refills: 1 | Status: SHIPPED | OUTPATIENT
Start: 2025-07-07

## 2025-07-08 RX ORDER — TAMSULOSIN HYDROCHLORIDE 0.4 MG/1
0.4 CAPSULE ORAL EVERY EVENING
Qty: 90 CAPSULE | Refills: 0 | Status: SHIPPED | OUTPATIENT
Start: 2025-07-08

## 2025-07-08 RX ORDER — FINASTERIDE 5 MG/1
5 TABLET, FILM COATED ORAL DAILY
Qty: 90 TABLET | Refills: 0 | Status: SHIPPED | OUTPATIENT
Start: 2025-07-08

## 2025-07-23 RX ORDER — PEN NEEDLE, DIABETIC 32GX 5/32"
1 NEEDLE, DISPOSABLE MISCELLANEOUS 3 TIMES DAILY
Qty: 100 EACH | Refills: 0 | Status: SHIPPED | OUTPATIENT
Start: 2025-07-23

## 2025-08-18 ENCOUNTER — OFFICE VISIT (OUTPATIENT)
Facility: CLINIC | Age: 85
End: 2025-08-18

## 2025-08-18 ENCOUNTER — TELEPHONE (OUTPATIENT)
Facility: CLINIC | Age: 85
End: 2025-08-18

## 2025-08-18 VITALS
BODY MASS INDEX: 24 KG/M2 | HEART RATE: 64 BPM | RESPIRATION RATE: 16 BRPM | WEIGHT: 150.19 LBS | DIASTOLIC BLOOD PRESSURE: 66 MMHG | SYSTOLIC BLOOD PRESSURE: 124 MMHG | OXYGEN SATURATION: 97 %

## 2025-08-18 DIAGNOSIS — E11.65 TYPE 2 DIABETES MELLITUS WITH HYPERGLYCEMIA, WITH LONG-TERM CURRENT USE OF INSULIN (HCC): Primary | ICD-10-CM

## 2025-08-18 DIAGNOSIS — N18.31 STAGE 3A CHRONIC KIDNEY DISEASE (HCC): ICD-10-CM

## 2025-08-18 DIAGNOSIS — I15.2 HYPERTENSION ASSOCIATED WITH DIABETES (HCC): ICD-10-CM

## 2025-08-18 DIAGNOSIS — I25.10 CORONARY ARTERY DISEASE INVOLVING NATIVE CORONARY ARTERY OF NATIVE HEART, UNSPECIFIED WHETHER ANGINA PRESENT: ICD-10-CM

## 2025-08-18 DIAGNOSIS — K70.31 ALCOHOLIC CIRRHOSIS OF LIVER WITH ASCITES (HCC): ICD-10-CM

## 2025-08-18 DIAGNOSIS — Z79.4 TYPE 2 DIABETES MELLITUS WITH HYPERGLYCEMIA, WITH LONG-TERM CURRENT USE OF INSULIN (HCC): Primary | ICD-10-CM

## 2025-08-18 DIAGNOSIS — E11.59 HYPERTENSION ASSOCIATED WITH DIABETES (HCC): ICD-10-CM

## 2025-08-18 LAB
CARTRIDGE LOT#: ABNORMAL NUMERIC
HEMOGLOBIN A1C: 6.7 % (ref 4.3–5.6)

## 2025-08-18 RX ORDER — INSULIN ASPART 100 [IU]/ML
INJECTION, SOLUTION INTRAVENOUS; SUBCUTANEOUS
Qty: 6 ML | Refills: 0 | Status: SHIPPED | OUTPATIENT
Start: 2025-08-18

## 2025-08-18 RX ORDER — LATANOPROST 50 UG/ML
SOLUTION/ DROPS OPHTHALMIC
COMMUNITY
Start: 2025-07-16

## 2025-08-18 RX ORDER — PEN NEEDLE, DIABETIC 32GX 5/32"
NEEDLE, DISPOSABLE MISCELLANEOUS
Qty: 180 EACH | Refills: 2 | Status: SHIPPED | OUTPATIENT
Start: 2025-08-18

## 2025-08-18 RX ORDER — DORZOLAMIDE HYDROCHLORIDE AND TIMOLOL MALEATE 20; 5 MG/ML; MG/ML
1 SOLUTION/ DROPS OPHTHALMIC 2 TIMES DAILY
COMMUNITY
Start: 2025-07-16

## 2025-08-18 RX ORDER — GLUCAGON INJECTION, SOLUTION 1 MG/.2ML
INJECTION, SOLUTION SUBCUTANEOUS
COMMUNITY
Start: 2025-08-18

## 2025-08-25 RX ORDER — SODIUM BICARBONATE 650 MG/1
650 TABLET ORAL 2 TIMES DAILY
Qty: 60 TABLET | Refills: 0 | Status: SHIPPED | OUTPATIENT
Start: 2025-08-25

## (undated) DIAGNOSIS — E11.65 TYPE 2 DIABETES MELLITUS WITH HYPERGLYCEMIA, WITH LONG-TERM CURRENT USE OF INSULIN (HCC): Primary | ICD-10-CM

## (undated) DIAGNOSIS — R74.8 ELEVATED LIVER ENZYMES: Primary | ICD-10-CM

## (undated) DIAGNOSIS — R74.01 NONSPECIFIC ELEVATION OF LEVELS OF TRANSAMINASE OR LACTIC ACID DEHYDROGENASE (LDH): Primary | ICD-10-CM

## (undated) DIAGNOSIS — R80.9 TYPE 2 DIABETES MELLITUS WITH MICROALBUMINURIA, WITHOUT LONG-TERM CURRENT USE OF INSULIN (HCC): ICD-10-CM

## (undated) DIAGNOSIS — E87.5 HYPERKALEMIA: ICD-10-CM

## (undated) DIAGNOSIS — E11.29 TYPE 2 DIABETES MELLITUS WITH MICROALBUMINURIA, WITHOUT LONG-TERM CURRENT USE OF INSULIN (HCC): ICD-10-CM

## (undated) DIAGNOSIS — R74.02 NONSPECIFIC ELEVATION OF LEVELS OF TRANSAMINASE OR LACTIC ACID DEHYDROGENASE (LDH): Primary | ICD-10-CM

## (undated) DIAGNOSIS — Z79.4 TYPE 2 DIABETES MELLITUS WITH HYPERGLYCEMIA, WITH LONG-TERM CURRENT USE OF INSULIN (HCC): Primary | ICD-10-CM

## (undated) DEVICE — ENDOSCOPY PACK - LOWER: Brand: MEDLINE INDUSTRIES, INC.

## (undated) DEVICE — ENDOSCOPY PACK UPPER: Brand: MEDLINE INDUSTRIES, INC.

## (undated) DEVICE — FORCEP BIOPSY RJ4 LG CAP W/ND

## (undated) DEVICE — 3M™ RED DOT™ MONITORING ELECTRODE WITH FOAM TAPE AND STICKY GEL, 50/BAG, 20/CASE, 72/PLT 2570: Brand: RED DOT™

## (undated) DEVICE — 1200CC GUARDIAN II: Brand: GUARDIAN

## (undated) DEVICE — FILTERLINE NASAL ADULT O2/CO2

## (undated) DEVICE — Device: Brand: DEFENDO AIR/WATER/SUCTION AND BIOPSY VALVE

## (undated) NOTE — LETTER
Cortney Jones [address]  [place], [address] [facility]    Consent for Operation  Date: __________________                                Time: _______________    1.  I authorize the performance upon Mihai Prince the following operation:  Proced procedure has been videotaped, the surgeon will obtain the original videotape. The hospital will not be responsible for storage or maintenance of this tape.     6. For the purpose of advancing medical education, I consent to the admittance of observers to t STATEMENTS REQUIRING INSERTION OR COMPLETION WERE FILLED IN.     Signature of Patient:   ___________________________    When the patient is a minor or mentally incompetent to give consent:  Signature of person authorized to consent for patient: ____________

## (undated) NOTE — MR AVS SNAPSHOT
[facility] [address] [place] [address] [address] [facility]  [phone] [id number] [phone]               Thank you for choosing us for your health care visit with Sebastián Mensah MD.  We are glad to serve you and happy to provide you wit GlyBURIDE-MetFORMIN 2.5-500 MG Tabs   Take 1 tablet by mouth 3 (three) times daily.    Commonly known as:  GLUCOVANCE           Linagliptin 5 MG Tabs   TAKE 1 TABLET BY MOUTH EVERY DAY IN THE MORNING   Commonly known as:  TRADJENTA           lisinopril 40 Summaries. If you've been to the Emergency Department or your doctor's office, you can view your past visit information in [facility] by going to Visits < Visit Summaries. [facility] questions? Call [phone] for help.   [employer] [facility] is NOT to be used for urge

## (undated) NOTE — LETTER
02/01/22    [address]    6/20/1940    Mr TorresMirna Teodora,     We have repeatedly requested for you to have lab work done prior to diabetes appointments so that we can determine if continuing Metformin is saf

## (undated) NOTE — Clinical Note
Thank you for your referral a1c at 7.5% ;having a lot of lows/highs, changed meds to better address this will keep you posted thanks

## (undated) NOTE — Clinical Note
Set up luis, not set up for clinic but provided instructions. He did not bring phone in to set up with us, only .

## (undated) NOTE — Clinical Note
FYI- pt doing well since d/c. MANOLO does not have any questions or concerns at this time. TE sent to triage regarding HFU appt. Thank you!

## (undated) NOTE — LETTER
[address]  [address]    Consent for Anesthesia    IOneal agree to be cared for by a physician anesthesiologist alone and/or with a nurse anesthetist, who is specially trained to monitor me and give me medicine to put me to sleep or keep me comfortable during my procedure    I understand that my anesthesiologist and/or anesthetist is not an employee or agent of [facility] or [employer]. He or she works for [employer].    As the patient asking for anesthesia services, I agree to:  Allow the anesthesiologist (anesthesia doctor) to give me medicine and do additional procedures as necessary. Some examples are: Starting or using an “IV” to give me medicine, fluids or blood during my procedure, and having a breathing tube placed to help me breathe when I’m asleep (intubation). In the event that my heart stops working properly, I understand that my anesthesiologist will make every effort to sustain my life, unless otherwise directed by [facility] Do Not Resuscitate documents.  Tell my anesthesia doctor before my procedure:   If I am pregnant.   The last time that I ate or drank.  iii. All of the medicines I take (including prescriptions, herbal supplements, and pills I can buy without a prescription (including street drugs/illegal medications). Failure to inform my anesthesiologist about these medicines may increase my risk of anesthetic complications.  Iv.If I am allergic to anything or have had a reaction to anesthesia before.  I understand how the anesthesia medicine will help me (benefits).  I understand that with any type of anesthesia medicine there are risks:  The most common risks are: nausea, vomiting, sore throat, muscle soreness, damage to my eyes, mouth, or teeth (from breathing tube placement).  Rare risks include: remembering what happened during my procedure, allergic reactions to medications, injury to my airway,  heart, lungs, vision, nerves, or muscles and in extremely rare instances death.  My doctor has explained to me other choices available to me for my care (alternatives).  Pregnant Patients (“epidural”):  I understand that the risks of having an epidural (medicine given into my back to help control pain during labor), include itching, low blood pressure, difficulty urinating, headache or slowing of the baby’s heart. Very rare risks include infection, bleeding, seizure, irregular heart rhythms and nerve injury.  Regional Anesthesia (“spinal”, “epidural”, & “nerve blocks”):  I understand that rare but potential complications include headache, bleeding, infection, seizure, irregular heart rhythms, and nerve injury.    _____________________________________________________________________________  Patient (or Representative) Signature/Relationship to Patient  Date   Time    _____________________________________________________________________________   Name (if used)    Language/Organization   Time    _____________________________________________________________________________  Nurse Anesthetist Signature     Date   Time    ______________________________________________________________________________  Anesthesiologist Signature     Date   Time  I have discussed the procedure and information above with the patient (or patient’s representative) and answered their questions. The patient or their representative has agreed to have anesthesia services.    _____________________________________________________________________________  Witness       Date   Time  I have verified that the signature is that of the patient or patient’s representative, and that it was signed before the procedure    Patient Name: Oneal Luna     : 1940                 Printed: 2024 at 11:14 AM    Medical Record #: [id number]                                            Page 1 of 1

## (undated) NOTE — LETTER
24    Oneal Luna  : 1940  [address]    [facility] [address]   Member ID#[id number]   [id number]    To Whom It May Concern,    Oneal Luna is under my care for Type 2 Diabetes Mellitus.  We recently completed a prior authorization for his Insulin Aspart (Novolog) Flextouch 100 units/ml.  It was denied, stating he must use Humalog brand / Lispro.  Due patient age, dexterity issues and vision impairment, patient will benefit from novolog flex pen to identify insulin doses with \"AUDIBLE clicks\" for safe insulin dosing.   Humalog kwik pen did not offer patient the same benefit and family was concerned about increased risk of hypoglycemia if incorrect insulin dose was administered.     Please reconsider your denial of novolog flex pen for safe insulin dosing in Mr Luna.     Regards,          Marlin Norris, MSN, APRN, ANP-BC, CDCES

## (undated) NOTE — Clinical Note
Barbara chowdary, I saw Mr. Dejuan Tellez today in clinic. It appears that he has iron deficiency anemia. His baseline Hb has been around 10-11 for 7 or so years, but it is now 8.9 with a ferritin of 18. He does report dark stools while on Xarelto/Plavix. Dr. Star Herman: he

## (undated) NOTE — LETTER
9/11/2020      Fazal Davis  [address] [facility]        Dear Cindi Willis,       Here are your results from your recent visit with Gastroenterology.        You will be happy to know that your stool test for the H.pylori ba

## (undated) NOTE — Clinical Note
Barbara Isaac.  Seeing this pt for CKD issues. Unfortunately as you know he is having some issues w/ hs DM control.  I spoke to grand daughter @ length today via phone. Hopefully you can do your magic and help improve his DM. Let me know if I can help. Thanks  KP

## (undated) NOTE — Clinical Note
Pp dinner readings high, plans to go to [facility] for 4 months, cardio does not advise. Can't change meds if he leaves, but did suggest some changes daughter very anxious.  Thanks

## (undated) NOTE — LETTER
[facility], [address] [facility] [place] [facility] [address] [facility]  [phone]               4/2/2020      Haley Bernardo,    We have been trying to reach you regarding your call to our office on 3/26/2020.     Dr. Reina Noriega

## (undated) NOTE — LETTER
07/05/17        [address] [address]      Dear Debra Gabriel,    [facility] records indicate that you have outstanding lab work and or testing that was ordered for you and has not yet been completed:          COMP METABOLIC PANEL

## (undated) NOTE — LETTER
1/12/2020          [address] [place] [address]    Dear Lakeisha Zuniga,       Here are the biopsy/pathology findings from your recent EGD (Upper  Endoscopy):     Biopsies taken from the stomach show that you have an infectio